# Patient Record
Sex: FEMALE | Race: OTHER | HISPANIC OR LATINO | URBAN - METROPOLITAN AREA
[De-identification: names, ages, dates, MRNs, and addresses within clinical notes are randomized per-mention and may not be internally consistent; named-entity substitution may affect disease eponyms.]

---

## 2017-01-12 ENCOUNTER — OUTPATIENT (OUTPATIENT)
Dept: OUTPATIENT SERVICES | Facility: HOSPITAL | Age: 52
LOS: 1 days | End: 2017-01-12
Payer: SELF-PAY

## 2017-01-12 ENCOUNTER — APPOINTMENT (OUTPATIENT)
Dept: DERMATOLOGY | Facility: HOSPITAL | Age: 52
End: 2017-01-12

## 2017-01-12 VITALS
BODY MASS INDEX: 31.24 KG/M2 | HEIGHT: 64 IN | HEART RATE: 5 BPM | DIASTOLIC BLOOD PRESSURE: 76 MMHG | SYSTOLIC BLOOD PRESSURE: 119 MMHG | WEIGHT: 183 LBS

## 2017-01-12 DIAGNOSIS — Z98.89 OTHER SPECIFIED POSTPROCEDURAL STATES: Chronic | ICD-10-CM

## 2017-01-12 DIAGNOSIS — Z98.51 TUBAL LIGATION STATUS: Chronic | ICD-10-CM

## 2017-01-12 DIAGNOSIS — L65.9 NONSCARRING HAIR LOSS, UNSPECIFIED: ICD-10-CM

## 2017-01-12 DIAGNOSIS — L98.9 DISORDER OF THE SKIN AND SUBCUTANEOUS TISSUE, UNSPECIFIED: ICD-10-CM

## 2017-01-12 PROCEDURE — G0463: CPT

## 2017-02-12 ENCOUNTER — FORM ENCOUNTER (OUTPATIENT)
Age: 52
End: 2017-02-12

## 2017-02-13 ENCOUNTER — OUTPATIENT (OUTPATIENT)
Dept: OUTPATIENT SERVICES | Facility: HOSPITAL | Age: 52
LOS: 1 days | End: 2017-02-13
Payer: SELF-PAY

## 2017-02-13 ENCOUNTER — APPOINTMENT (OUTPATIENT)
Dept: MAMMOGRAPHY | Facility: IMAGING CENTER | Age: 52
End: 2017-02-13

## 2017-02-13 ENCOUNTER — APPOINTMENT (OUTPATIENT)
Dept: ULTRASOUND IMAGING | Facility: IMAGING CENTER | Age: 52
End: 2017-02-13

## 2017-02-13 DIAGNOSIS — E04.1 NONTOXIC SINGLE THYROID NODULE: ICD-10-CM

## 2017-02-13 DIAGNOSIS — Z98.89 OTHER SPECIFIED POSTPROCEDURAL STATES: Chronic | ICD-10-CM

## 2017-02-13 DIAGNOSIS — N63 UNSPECIFIED LUMP IN BREAST: ICD-10-CM

## 2017-02-13 DIAGNOSIS — Z98.51 TUBAL LIGATION STATUS: Chronic | ICD-10-CM

## 2017-02-13 DIAGNOSIS — Z00.8 ENCOUNTER FOR OTHER GENERAL EXAMINATION: ICD-10-CM

## 2017-02-13 PROCEDURE — 76641 ULTRASOUND BREAST COMPLETE: CPT

## 2017-02-13 PROCEDURE — 77063 BREAST TOMOSYNTHESIS BI: CPT

## 2017-02-13 PROCEDURE — 77067 SCR MAMMO BI INCL CAD: CPT

## 2017-02-14 ENCOUNTER — CHART COPY (OUTPATIENT)
Age: 52
End: 2017-02-14

## 2017-12-11 ENCOUNTER — LABORATORY RESULT (OUTPATIENT)
Age: 52
End: 2017-12-11

## 2017-12-11 ENCOUNTER — APPOINTMENT (OUTPATIENT)
Dept: INTERNAL MEDICINE | Facility: CLINIC | Age: 52
End: 2017-12-11

## 2017-12-11 ENCOUNTER — OUTPATIENT (OUTPATIENT)
Dept: OUTPATIENT SERVICES | Facility: HOSPITAL | Age: 52
LOS: 1 days | End: 2017-12-11
Payer: SELF-PAY

## 2017-12-11 VITALS
HEART RATE: 82 BPM | HEIGHT: 64 IN | WEIGHT: 180 LBS | DIASTOLIC BLOOD PRESSURE: 80 MMHG | SYSTOLIC BLOOD PRESSURE: 118 MMHG | OXYGEN SATURATION: 98 % | BODY MASS INDEX: 30.73 KG/M2

## 2017-12-11 DIAGNOSIS — Z98.51 TUBAL LIGATION STATUS: Chronic | ICD-10-CM

## 2017-12-11 DIAGNOSIS — E55.9 VITAMIN D DEFICIENCY, UNSPECIFIED: ICD-10-CM

## 2017-12-11 DIAGNOSIS — I10 ESSENTIAL (PRIMARY) HYPERTENSION: ICD-10-CM

## 2017-12-11 DIAGNOSIS — Z98.89 OTHER SPECIFIED POSTPROCEDURAL STATES: Chronic | ICD-10-CM

## 2017-12-18 ENCOUNTER — LABORATORY RESULT (OUTPATIENT)
Age: 52
End: 2017-12-18

## 2017-12-18 PROCEDURE — 90688 IIV4 VACCINE SPLT 0.5 ML IM: CPT

## 2017-12-18 PROCEDURE — 87338 HPYLORI STOOL AG IA: CPT

## 2017-12-18 PROCEDURE — G0463: CPT

## 2017-12-18 PROCEDURE — G0008: CPT

## 2017-12-19 DIAGNOSIS — K21.9 GASTRO-ESOPHAGEAL REFLUX DISEASE WITHOUT ESOPHAGITIS: ICD-10-CM

## 2017-12-19 DIAGNOSIS — E04.1 NONTOXIC SINGLE THYROID NODULE: ICD-10-CM

## 2017-12-19 DIAGNOSIS — Z23 ENCOUNTER FOR IMMUNIZATION: ICD-10-CM

## 2017-12-20 LAB — H PYLORI AG STL QL: NEGATIVE — SIGNIFICANT CHANGE UP

## 2017-12-22 LAB
ALBUMIN SERPL ELPH-MCNC: 4.4 G/DL
ALP BLD-CCNC: 88 U/L
ALT SERPL-CCNC: 34 U/L
ANION GAP SERPL CALC-SCNC: 10 MMOL/L
AST SERPL-CCNC: 20 U/L
BASOPHILS # BLD AUTO: 0.03 K/UL
BASOPHILS NFR BLD AUTO: 0.6 %
BILIRUB SERPL-MCNC: 0.4 MG/DL
BUN SERPL-MCNC: 12 MG/DL
CALCIUM SERPL-MCNC: 9.8 MG/DL
CHLORIDE SERPL-SCNC: 102 MMOL/L
CHOLEST SERPL-MCNC: 232 MG/DL
CHOLEST/HDLC SERPL: 4.2 RATIO
CO2 SERPL-SCNC: 28 MMOL/L
CREAT SERPL-MCNC: 0.61 MG/DL
EOSINOPHIL # BLD AUTO: 0.07 K/UL
EOSINOPHIL NFR BLD AUTO: 1.4 %
GLUCOSE SERPL-MCNC: 86 MG/DL
HBA1C MFR BLD HPLC: 5.4 %
HCT VFR BLD CALC: 43.4 %
HDLC SERPL-MCNC: 55 MG/DL
HGB BLD-MCNC: 14.3 G/DL
IMM GRANULOCYTES NFR BLD AUTO: 0.2 %
LDLC SERPL CALC-MCNC: 135 MG/DL
LYMPHOCYTES # BLD AUTO: 1.92 K/UL
LYMPHOCYTES NFR BLD AUTO: 38.8 %
MAN DIFF?: NORMAL
MCHC RBC-ENTMCNC: 29.9 PG
MCHC RBC-ENTMCNC: 32.9 GM/DL
MCV RBC AUTO: 90.6 FL
MONOCYTES # BLD AUTO: 0.4 K/UL
MONOCYTES NFR BLD AUTO: 8.1 %
NEUTROPHILS # BLD AUTO: 2.52 K/UL
NEUTROPHILS NFR BLD AUTO: 50.9 %
PLATELET # BLD AUTO: 217 K/UL
POTASSIUM SERPL-SCNC: 4.4 MMOL/L
PROT SERPL-MCNC: 7.8 G/DL
RBC # BLD: 4.79 M/UL
RBC # FLD: 12.6 %
SODIUM SERPL-SCNC: 140 MMOL/L
TRIGL SERPL-MCNC: 212 MG/DL
TSH SERPL-ACNC: 1.94 UIU/ML
WBC # FLD AUTO: 4.95 K/UL

## 2018-01-21 ENCOUNTER — FORM ENCOUNTER (OUTPATIENT)
Age: 53
End: 2018-01-21

## 2018-01-22 ENCOUNTER — APPOINTMENT (OUTPATIENT)
Dept: ULTRASOUND IMAGING | Facility: IMAGING CENTER | Age: 53
End: 2018-01-22
Payer: COMMERCIAL

## 2018-01-22 ENCOUNTER — APPOINTMENT (OUTPATIENT)
Dept: MAMMOGRAPHY | Facility: IMAGING CENTER | Age: 53
End: 2018-01-22
Payer: COMMERCIAL

## 2018-01-22 ENCOUNTER — OUTPATIENT (OUTPATIENT)
Dept: OUTPATIENT SERVICES | Facility: HOSPITAL | Age: 53
LOS: 1 days | End: 2018-01-22
Payer: SELF-PAY

## 2018-01-22 DIAGNOSIS — E04.1 NONTOXIC SINGLE THYROID NODULE: ICD-10-CM

## 2018-01-22 DIAGNOSIS — Z98.51 TUBAL LIGATION STATUS: Chronic | ICD-10-CM

## 2018-01-22 DIAGNOSIS — K21.9 GASTRO-ESOPHAGEAL REFLUX DISEASE WITHOUT ESOPHAGITIS: ICD-10-CM

## 2018-01-22 DIAGNOSIS — E55.9 VITAMIN D DEFICIENCY, UNSPECIFIED: ICD-10-CM

## 2018-01-22 DIAGNOSIS — Z23 ENCOUNTER FOR IMMUNIZATION: ICD-10-CM

## 2018-01-22 DIAGNOSIS — Z98.89 OTHER SPECIFIED POSTPROCEDURAL STATES: Chronic | ICD-10-CM

## 2018-01-22 PROCEDURE — G0279: CPT | Mod: 26

## 2018-01-22 PROCEDURE — 76642 ULTRASOUND BREAST LIMITED: CPT | Mod: 26,LT

## 2018-01-22 PROCEDURE — 76642 ULTRASOUND BREAST LIMITED: CPT

## 2018-01-22 PROCEDURE — 77066 DX MAMMO INCL CAD BI: CPT | Mod: 26

## 2018-01-22 PROCEDURE — 77066 DX MAMMO INCL CAD BI: CPT

## 2018-01-22 PROCEDURE — G0279: CPT

## 2018-02-08 ENCOUNTER — APPOINTMENT (OUTPATIENT)
Dept: DERMATOLOGY | Facility: HOSPITAL | Age: 53
End: 2018-02-08

## 2018-02-08 ENCOUNTER — OUTPATIENT (OUTPATIENT)
Dept: OUTPATIENT SERVICES | Facility: HOSPITAL | Age: 53
LOS: 1 days | End: 2018-02-08
Payer: SELF-PAY

## 2018-02-08 VITALS
BODY MASS INDEX: 31.07 KG/M2 | DIASTOLIC BLOOD PRESSURE: 78 MMHG | SYSTOLIC BLOOD PRESSURE: 118 MMHG | HEIGHT: 64 IN | HEART RATE: 80 BPM | WEIGHT: 182 LBS | RESPIRATION RATE: 16 BRPM

## 2018-02-08 DIAGNOSIS — Z98.89 OTHER SPECIFIED POSTPROCEDURAL STATES: Chronic | ICD-10-CM

## 2018-02-08 DIAGNOSIS — Z98.51 TUBAL LIGATION STATUS: Chronic | ICD-10-CM

## 2018-02-08 DIAGNOSIS — L98.9 DISORDER OF THE SKIN AND SUBCUTANEOUS TISSUE, UNSPECIFIED: ICD-10-CM

## 2018-02-08 PROCEDURE — G0463: CPT

## 2018-02-13 ENCOUNTER — OUTPATIENT (OUTPATIENT)
Dept: OUTPATIENT SERVICES | Facility: HOSPITAL | Age: 53
LOS: 1 days | End: 2018-02-13
Payer: SELF-PAY

## 2018-02-13 ENCOUNTER — APPOINTMENT (OUTPATIENT)
Dept: GASTROENTEROLOGY | Facility: HOSPITAL | Age: 53
End: 2018-02-13

## 2018-02-13 VITALS
DIASTOLIC BLOOD PRESSURE: 82 MMHG | WEIGHT: 182 LBS | HEIGHT: 64 IN | RESPIRATION RATE: 14 BRPM | HEART RATE: 89 BPM | SYSTOLIC BLOOD PRESSURE: 125 MMHG | BODY MASS INDEX: 31.07 KG/M2

## 2018-02-13 DIAGNOSIS — K31.0 ACUTE DILATATION OF STOMACH: ICD-10-CM

## 2018-02-13 DIAGNOSIS — Z98.89 OTHER SPECIFIED POSTPROCEDURAL STATES: Chronic | ICD-10-CM

## 2018-02-13 DIAGNOSIS — Z12.11 ENCOUNTER FOR SCREENING FOR MALIGNANT NEOPLASM OF COLON: ICD-10-CM

## 2018-02-13 DIAGNOSIS — Z98.51 TUBAL LIGATION STATUS: Chronic | ICD-10-CM

## 2018-02-13 PROCEDURE — G0463: CPT

## 2018-02-13 RX ORDER — POLYETHYLENE GLYCOL 3350, SODIUM SULFATE ANHYDROUS, SODIUM BICARBONATE, SODIUM CHLORIDE, POTASSIUM CHLORIDE 227.1; 21.5; 6.36; 5.53; .754 G/L; G/L; G/L; G/L; G/L
227.1 POWDER, FOR SOLUTION ORAL
Qty: 4000 | Refills: 0 | Status: DISCONTINUED | COMMUNITY
Start: 2018-02-13 | End: 2018-02-13

## 2018-03-08 ENCOUNTER — APPOINTMENT (OUTPATIENT)
Dept: ENDOCRINOLOGY | Facility: HOSPITAL | Age: 53
End: 2018-03-08

## 2018-03-08 ENCOUNTER — OUTPATIENT (OUTPATIENT)
Dept: OUTPATIENT SERVICES | Facility: HOSPITAL | Age: 53
LOS: 1 days | End: 2018-03-08
Payer: SELF-PAY

## 2018-03-08 VITALS
HEART RATE: 97 BPM | DIASTOLIC BLOOD PRESSURE: 74 MMHG | HEIGHT: 64 IN | WEIGHT: 184 LBS | SYSTOLIC BLOOD PRESSURE: 123 MMHG | BODY MASS INDEX: 31.41 KG/M2

## 2018-03-08 DIAGNOSIS — Z98.89 OTHER SPECIFIED POSTPROCEDURAL STATES: Chronic | ICD-10-CM

## 2018-03-08 DIAGNOSIS — E04.1 NONTOXIC SINGLE THYROID NODULE: ICD-10-CM

## 2018-03-08 DIAGNOSIS — Z82.5 FAMILY HISTORY OF ASTHMA AND OTHER CHRONIC LOWER RESPIRATORY DISEASES: ICD-10-CM

## 2018-03-08 DIAGNOSIS — E06.9 THYROIDITIS, UNSPECIFIED: ICD-10-CM

## 2018-03-08 DIAGNOSIS — Z98.51 TUBAL LIGATION STATUS: Chronic | ICD-10-CM

## 2018-03-08 DIAGNOSIS — Z82.49 FAMILY HISTORY OF ISCHEMIC HEART DISEASE AND OTHER DISEASES OF THE CIRCULATORY SYSTEM: ICD-10-CM

## 2018-03-08 DIAGNOSIS — R79.89 OTHER SPECIFIED ABNORMAL FINDINGS OF BLOOD CHEMISTRY: ICD-10-CM

## 2018-03-08 DIAGNOSIS — R73.09 OTHER ABNORMAL GLUCOSE: ICD-10-CM

## 2018-03-08 DIAGNOSIS — Z83.3 FAMILY HISTORY OF DIABETES MELLITUS: ICD-10-CM

## 2018-03-08 PROCEDURE — G0463: CPT

## 2018-03-08 RX ORDER — OMEPRAZOLE 40 MG/1
40 CAPSULE, DELAYED RELEASE ORAL
Qty: 30 | Refills: 0 | Status: DISCONTINUED | COMMUNITY
Start: 2017-12-11 | End: 2018-03-08

## 2018-03-14 ENCOUNTER — OUTPATIENT (OUTPATIENT)
Dept: OUTPATIENT SERVICES | Facility: HOSPITAL | Age: 53
LOS: 1 days | End: 2018-03-14
Payer: SELF-PAY

## 2018-03-14 ENCOUNTER — RESULT REVIEW (OUTPATIENT)
Age: 53
End: 2018-03-14

## 2018-03-14 ENCOUNTER — APPOINTMENT (OUTPATIENT)
Dept: ULTRASOUND IMAGING | Facility: IMAGING CENTER | Age: 53
End: 2018-03-14
Payer: SELF-PAY

## 2018-03-14 DIAGNOSIS — Z98.89 OTHER SPECIFIED POSTPROCEDURAL STATES: Chronic | ICD-10-CM

## 2018-03-14 DIAGNOSIS — Z98.51 TUBAL LIGATION STATUS: Chronic | ICD-10-CM

## 2018-03-14 DIAGNOSIS — E04.1 NONTOXIC SINGLE THYROID NODULE: ICD-10-CM

## 2018-03-14 PROCEDURE — 88173 CYTOPATH EVAL FNA REPORT: CPT | Mod: 26

## 2018-03-14 PROCEDURE — 10022: CPT

## 2018-03-14 PROCEDURE — 76942 ECHO GUIDE FOR BIOPSY: CPT | Mod: 26

## 2018-03-15 PROCEDURE — 88172 CYTP DX EVAL FNA 1ST EA SITE: CPT

## 2018-03-15 PROCEDURE — 10022: CPT

## 2018-03-15 PROCEDURE — 81445 SO NEO GSAP 5-50DNA/DNA&RNA: CPT

## 2018-03-15 PROCEDURE — 76942 ECHO GUIDE FOR BIOPSY: CPT

## 2018-03-15 PROCEDURE — 88173 CYTOPATH EVAL FNA REPORT: CPT

## 2018-03-29 ENCOUNTER — RESULT REVIEW (OUTPATIENT)
Age: 53
End: 2018-03-29

## 2018-03-29 ENCOUNTER — OUTPATIENT (OUTPATIENT)
Dept: OUTPATIENT SERVICES | Facility: HOSPITAL | Age: 53
LOS: 1 days | End: 2018-03-29
Payer: SELF-PAY

## 2018-03-29 DIAGNOSIS — Z98.89 OTHER SPECIFIED POSTPROCEDURAL STATES: Chronic | ICD-10-CM

## 2018-03-29 DIAGNOSIS — Z00.00 ENCOUNTER FOR GENERAL ADULT MEDICAL EXAMINATION WITHOUT ABNORMAL FINDINGS: ICD-10-CM

## 2018-03-29 DIAGNOSIS — Z98.51 TUBAL LIGATION STATUS: Chronic | ICD-10-CM

## 2018-03-29 PROCEDURE — 45381 COLONOSCOPY SUBMUCOUS NJX: CPT | Mod: PT

## 2018-03-29 PROCEDURE — 45380 COLONOSCOPY AND BIOPSY: CPT | Mod: 59,33

## 2018-03-29 PROCEDURE — 45385 COLONOSCOPY W/LESION REMOVAL: CPT | Mod: 33

## 2018-03-29 PROCEDURE — 45385 COLONOSCOPY W/LESION REMOVAL: CPT | Mod: PT

## 2018-03-29 PROCEDURE — 45380 COLONOSCOPY AND BIOPSY: CPT | Mod: XS,PT

## 2018-05-02 ENCOUNTER — OUTPATIENT (OUTPATIENT)
Dept: OUTPATIENT SERVICES | Facility: HOSPITAL | Age: 53
LOS: 1 days | End: 2018-05-02
Payer: SELF-PAY

## 2018-05-02 ENCOUNTER — APPOINTMENT (OUTPATIENT)
Dept: INTERNAL MEDICINE | Facility: CLINIC | Age: 53
End: 2018-05-02

## 2018-05-02 VITALS — SYSTOLIC BLOOD PRESSURE: 130 MMHG | DIASTOLIC BLOOD PRESSURE: 89 MMHG | OXYGEN SATURATION: 99 % | HEART RATE: 98 BPM

## 2018-05-02 VITALS — BODY MASS INDEX: 31.93 KG/M2 | WEIGHT: 186 LBS

## 2018-05-02 DIAGNOSIS — I10 ESSENTIAL (PRIMARY) HYPERTENSION: ICD-10-CM

## 2018-05-02 DIAGNOSIS — Z98.51 TUBAL LIGATION STATUS: Chronic | ICD-10-CM

## 2018-05-02 DIAGNOSIS — Z98.89 OTHER SPECIFIED POSTPROCEDURAL STATES: Chronic | ICD-10-CM

## 2018-05-02 PROCEDURE — G0463: CPT

## 2018-05-09 DIAGNOSIS — C73 MALIGNANT NEOPLASM OF THYROID GLAND: ICD-10-CM

## 2018-05-10 ENCOUNTER — OUTPATIENT (OUTPATIENT)
Dept: OUTPATIENT SERVICES | Facility: HOSPITAL | Age: 53
LOS: 1 days | Discharge: HOME | End: 2018-05-10

## 2018-05-10 ENCOUNTER — APPOINTMENT (OUTPATIENT)
Dept: OTOLARYNGOLOGY | Facility: CLINIC | Age: 53
End: 2018-05-10
Payer: COMMERCIAL

## 2018-05-10 VITALS
WEIGHT: 186 LBS | BODY MASS INDEX: 31.76 KG/M2 | DIASTOLIC BLOOD PRESSURE: 91 MMHG | SYSTOLIC BLOOD PRESSURE: 156 MMHG | HEIGHT: 64 IN | HEART RATE: 94 BPM

## 2018-05-10 DIAGNOSIS — Z98.51 TUBAL LIGATION STATUS: Chronic | ICD-10-CM

## 2018-05-10 DIAGNOSIS — Z98.89 OTHER SPECIFIED POSTPROCEDURAL STATES: Chronic | ICD-10-CM

## 2018-05-10 PROCEDURE — 99204 OFFICE O/P NEW MOD 45 MIN: CPT | Mod: 25

## 2018-05-10 PROCEDURE — 31575 DIAGNOSTIC LARYNGOSCOPY: CPT

## 2018-05-14 DIAGNOSIS — E04.1 NONTOXIC SINGLE THYROID NODULE: ICD-10-CM

## 2018-05-22 ENCOUNTER — OUTPATIENT (OUTPATIENT)
Dept: OUTPATIENT SERVICES | Facility: HOSPITAL | Age: 53
LOS: 1 days | End: 2018-05-22
Payer: SUBSIDIZED

## 2018-05-22 VITALS
TEMPERATURE: 97 F | HEART RATE: 60 BPM | HEIGHT: 64.5 IN | SYSTOLIC BLOOD PRESSURE: 120 MMHG | RESPIRATION RATE: 16 BRPM | DIASTOLIC BLOOD PRESSURE: 80 MMHG | WEIGHT: 181 LBS | OXYGEN SATURATION: 97 %

## 2018-05-22 DIAGNOSIS — Z98.89 OTHER SPECIFIED POSTPROCEDURAL STATES: Chronic | ICD-10-CM

## 2018-05-22 DIAGNOSIS — E04.1 NONTOXIC SINGLE THYROID NODULE: ICD-10-CM

## 2018-05-22 DIAGNOSIS — Z98.51 TUBAL LIGATION STATUS: Chronic | ICD-10-CM

## 2018-05-22 DIAGNOSIS — Z90.710 ACQUIRED ABSENCE OF BOTH CERVIX AND UTERUS: Chronic | ICD-10-CM

## 2018-05-22 DIAGNOSIS — E07.9 DISORDER OF THYROID, UNSPECIFIED: ICD-10-CM

## 2018-05-22 LAB
BUN SERPL-MCNC: 7 MG/DL — SIGNIFICANT CHANGE UP (ref 7–23)
CALCIUM SERPL-MCNC: 9.1 MG/DL — SIGNIFICANT CHANGE UP (ref 8.4–10.5)
CHLORIDE SERPL-SCNC: 101 MMOL/L — SIGNIFICANT CHANGE UP (ref 98–107)
CO2 SERPL-SCNC: 26 MMOL/L — SIGNIFICANT CHANGE UP (ref 22–31)
CREAT SERPL-MCNC: 0.53 MG/DL — SIGNIFICANT CHANGE UP (ref 0.5–1.3)
GLUCOSE SERPL-MCNC: 84 MG/DL — SIGNIFICANT CHANGE UP (ref 70–99)
HCT VFR BLD CALC: 42.9 % — SIGNIFICANT CHANGE UP (ref 34.5–45)
HGB BLD-MCNC: 14.3 G/DL — SIGNIFICANT CHANGE UP (ref 11.5–15.5)
MCHC RBC-ENTMCNC: 29.7 PG — SIGNIFICANT CHANGE UP (ref 27–34)
MCHC RBC-ENTMCNC: 33.3 % — SIGNIFICANT CHANGE UP (ref 32–36)
MCV RBC AUTO: 89 FL — SIGNIFICANT CHANGE UP (ref 80–100)
NRBC # FLD: 0 — SIGNIFICANT CHANGE UP
PLATELET # BLD AUTO: 198 K/UL — SIGNIFICANT CHANGE UP (ref 150–400)
PMV BLD: 11.5 FL — SIGNIFICANT CHANGE UP (ref 7–13)
POTASSIUM SERPL-MCNC: 3.7 MMOL/L — SIGNIFICANT CHANGE UP (ref 3.5–5.3)
POTASSIUM SERPL-SCNC: 3.7 MMOL/L — SIGNIFICANT CHANGE UP (ref 3.5–5.3)
RBC # BLD: 4.82 M/UL — SIGNIFICANT CHANGE UP (ref 3.8–5.2)
RBC # FLD: 11.7 % — SIGNIFICANT CHANGE UP (ref 10.3–14.5)
SODIUM SERPL-SCNC: 141 MMOL/L — SIGNIFICANT CHANGE UP (ref 135–145)
WBC # BLD: 4.62 K/UL — SIGNIFICANT CHANGE UP (ref 3.8–10.5)
WBC # FLD AUTO: 4.62 K/UL — SIGNIFICANT CHANGE UP (ref 3.8–10.5)

## 2018-05-22 PROCEDURE — 93010 ELECTROCARDIOGRAM REPORT: CPT

## 2018-05-22 RX ORDER — SODIUM CHLORIDE 9 MG/ML
1000 INJECTION, SOLUTION INTRAVENOUS
Qty: 0 | Refills: 0 | Status: DISCONTINUED | OUTPATIENT
Start: 2018-05-30 | End: 2018-06-14

## 2018-05-22 NOTE — H&P PST ADULT - LYMPHATIC
anterior cervical R/posterior cervical L/supraclavicular R/anterior cervical L/supraclavicular L/posterior cervical R

## 2018-05-22 NOTE — H&P PST ADULT - PROBLEM SELECTOR PLAN 1
Left Thyroid Lobectomy    Pre op instructions given to pt ; via [facility]  ; including Hibiclens and pepcid ; pt appears to have a good understanding of pre op instructions

## 2018-05-22 NOTE — H&P PST ADULT - NEGATIVE NEUROLOGICAL SYMPTOMS
no generalized seizures/no transient paralysis/no loss of sensation/no focal seizures/no difficulty walking/no paresthesias/no weakness

## 2018-05-22 NOTE — H&P PST ADULT - HISTORY OF PRESENT ILLNESS
Pt is a 52 y.o. Romanian speaking female ; pst done with assistance of [facility]  # [id number] ; pt reports Pt is a 52 y.o. Mauritian speaking female ; pst done with assistance of [facility]  # [id number] ; pt reports h/o left thyroid nodule ; pt states a biopsy was done ; pt to surgeon ; pt now presents for Left Thyroid Lobectomy

## 2018-05-22 NOTE — H&P PST ADULT - PSH
H/O abdominal hysterectomy  2016  S/P breast biopsy, left  3/18 ; pt states " benign "  S/P tubal ligation  24 yrs ago

## 2018-05-22 NOTE — H&P PST ADULT - PMH
Benign breast lumps, left    HTN (hypertension)  pt states recently noted ; pt denies rx  Iron deficiency anemia    Uterine leiomyoma Benign breast lumps, left    HTN (hypertension)  pt states recently noted ; pt denies rx  Iron deficiency anemia    Thyroid nodule  left  Uterine leiomyoma Benign breast lumps, left    HTN (hypertension)  pt states recently noted ; pt denies rx  Hypercholesterolemia    Iron deficiency anemia    Thyroid nodule  left  Uterine leiomyoma

## 2018-05-22 NOTE — H&P PST ADULT - NSANTHOSAYNRD_GEN_A_CORE
No. DAVID screening performed.  STOP BANG Legend: 0-2 = LOW Risk; 3-4 = INTERMEDIATE Risk; 5-8 = HIGH Risk/pt denies partner

## 2018-05-29 ENCOUNTER — TRANSCRIPTION ENCOUNTER (OUTPATIENT)
Age: 53
End: 2018-05-29

## 2018-05-29 NOTE — ASU PATIENT PROFILE, ADULT - PMH
Benign breast lumps, left    HTN (hypertension)  pt states recently noted ; pt denies rx  Hypercholesterolemia    Iron deficiency anemia    Thyroid nodule  left  Uterine leiomyoma

## 2018-05-30 ENCOUNTER — OUTPATIENT (OUTPATIENT)
Dept: OUTPATIENT SERVICES | Facility: HOSPITAL | Age: 53
LOS: 1 days | Discharge: HOME | End: 2018-05-30
Payer: SUBSIDIZED

## 2018-05-30 ENCOUNTER — APPOINTMENT (OUTPATIENT)
Dept: OTOLARYNGOLOGY | Facility: HOSPITAL | Age: 53
End: 2018-05-30

## 2018-05-30 ENCOUNTER — RESULT REVIEW (OUTPATIENT)
Age: 53
End: 2018-05-30

## 2018-05-30 VITALS
SYSTOLIC BLOOD PRESSURE: 126 MMHG | RESPIRATION RATE: 16 BRPM | OXYGEN SATURATION: 96 % | HEART RATE: 86 BPM | DIASTOLIC BLOOD PRESSURE: 77 MMHG

## 2018-05-30 VITALS
TEMPERATURE: 98 F | WEIGHT: 181 LBS | RESPIRATION RATE: 16 BRPM | HEIGHT: 64.5 IN | DIASTOLIC BLOOD PRESSURE: 79 MMHG | OXYGEN SATURATION: 100 % | HEART RATE: 73 BPM | SYSTOLIC BLOOD PRESSURE: 123 MMHG

## 2018-05-30 DIAGNOSIS — E04.1 NONTOXIC SINGLE THYROID NODULE: ICD-10-CM

## 2018-05-30 DIAGNOSIS — Z98.51 TUBAL LIGATION STATUS: Chronic | ICD-10-CM

## 2018-05-30 DIAGNOSIS — Z98.89 OTHER SPECIFIED POSTPROCEDURAL STATES: Chronic | ICD-10-CM

## 2018-05-30 DIAGNOSIS — Z90.710 ACQUIRED ABSENCE OF BOTH CERVIX AND UTERUS: Chronic | ICD-10-CM

## 2018-05-30 PROCEDURE — 88307 TISSUE EXAM BY PATHOLOGIST: CPT | Mod: 26

## 2018-05-30 PROCEDURE — 60220 PARTIAL REMOVAL OF THYROID: CPT

## 2018-05-30 RX ORDER — OXYCODONE HYDROCHLORIDE 5 MG/1
5 TABLET ORAL EVERY 4 HOURS
Qty: 0 | Refills: 0 | Status: DISCONTINUED | OUTPATIENT
Start: 2018-05-30 | End: 2018-05-30

## 2018-05-30 RX ORDER — OXYCODONE AND ACETAMINOPHEN 5; 325 MG/1; MG/1
2 TABLET ORAL EVERY 6 HOURS
Qty: 0 | Refills: 0 | Status: DISCONTINUED | OUTPATIENT
Start: 2018-05-30 | End: 2018-05-30

## 2018-05-30 RX ORDER — SODIUM CHLORIDE 9 MG/ML
1000 INJECTION, SOLUTION INTRAVENOUS
Qty: 0 | Refills: 0 | Status: DISCONTINUED | OUTPATIENT
Start: 2018-05-30 | End: 2018-06-14

## 2018-05-30 RX ORDER — FENTANYL CITRATE 50 UG/ML
25 INJECTION INTRAVENOUS
Qty: 0 | Refills: 0 | Status: DISCONTINUED | OUTPATIENT
Start: 2018-05-30 | End: 2018-05-30

## 2018-05-30 RX ORDER — ONDANSETRON 8 MG/1
4 TABLET, FILM COATED ORAL ONCE
Qty: 0 | Refills: 0 | Status: DISCONTINUED | OUTPATIENT
Start: 2018-05-30 | End: 2018-05-30

## 2018-05-30 RX ORDER — OXYCODONE AND ACETAMINOPHEN 5; 325 MG/1; MG/1
1 TABLET ORAL EVERY 4 HOURS
Qty: 0 | Refills: 0 | Status: DISCONTINUED | OUTPATIENT
Start: 2018-05-30 | End: 2018-05-30

## 2018-05-30 RX ORDER — METOCLOPRAMIDE HCL 10 MG
10 TABLET ORAL ONCE
Qty: 0 | Refills: 0 | Status: DISCONTINUED | OUTPATIENT
Start: 2018-05-30 | End: 2018-05-30

## 2018-05-30 RX ADMIN — FENTANYL CITRATE 25 MICROGRAM(S): 50 INJECTION INTRAVENOUS at 13:45

## 2018-05-30 RX ADMIN — FENTANYL CITRATE 25 MICROGRAM(S): 50 INJECTION INTRAVENOUS at 13:10

## 2018-05-30 RX ADMIN — SODIUM CHLORIDE 75 MILLILITER(S): 9 INJECTION, SOLUTION INTRAVENOUS at 12:30

## 2018-05-30 RX ADMIN — FENTANYL CITRATE 25 MICROGRAM(S): 50 INJECTION INTRAVENOUS at 13:30

## 2018-05-30 RX ADMIN — FENTANYL CITRATE 25 MICROGRAM(S): 50 INJECTION INTRAVENOUS at 12:55

## 2018-05-30 RX ADMIN — OXYCODONE HYDROCHLORIDE 5 MILLIGRAM(S): 5 TABLET ORAL at 15:10

## 2018-05-30 NOTE — ASU DISCHARGE PLAN (ADULT/PEDIATRIC). - NURSING INSTRUCTIONS
Do not start taking your pain medications until after 5:50pm today. Do not take pain medication on an empty stomach.  Increase fluids and fiber in diet to prevent constipation.     Call MD for any neck swelling, any shortness of breath, or any redness/drainage from incision. Stay away from hot, spicy and jagged edged foods.  Call MD for any nasal tip, fingertip or extremity numbness/tingling.     Also call the doctor for any questions or concerns regarding your care and to schedule your follow-up visit. Do not start taking your pain medications until after 7:10pm tonight. Also do not take pain medication on an empty stomach.  Increase fluids and fiber in diet to prevent constipation.     Call MD for any neck swelling, any shortness of breath, or any redness/drainage from incision. Stay away from hot, spicy and jagged edged foods.  Call MD for any nasal tip, fingertip or extremity numbness/tingling.     Also call the doctor for any questions or concerns regarding your care and to schedule your follow-up visit.

## 2018-05-30 NOTE — ASU DISCHARGE PLAN (ADULT/PEDIATRIC). - MEDICATION SUMMARY - MEDICATIONS TO TAKE
I will START or STAY ON the medications listed below when I get home from the hospital:    oxyCODONE-acetaminophen 5 mg-325 mg oral tablet  -- 1 tab(s) by mouth every 4 hours, As needed, Moderate Pain MDD:4 tabs  -- Indication: For for severe pain    Vitamin C 500 mg oral tablet  -- 1 tab(s) by mouth once a day  -- Indication: For home med

## 2018-05-30 NOTE — ASU DISCHARGE PLAN (ADULT/PEDIATRIC). - NOTIFY
Bleeding that does not stop/Numbness, tingling/Swelling that continues/Fever greater than 101/Numbness, color, or temperature change to extremity/Persistent Nausea and Vomiting/Unable to Urinate/Pain not relieved by Medications

## 2018-06-14 ENCOUNTER — APPOINTMENT (OUTPATIENT)
Dept: OTOLARYNGOLOGY | Facility: CLINIC | Age: 53
End: 2018-06-14
Payer: COMMERCIAL

## 2018-06-14 VITALS
HEIGHT: 64 IN | BODY MASS INDEX: 31.41 KG/M2 | SYSTOLIC BLOOD PRESSURE: 161 MMHG | WEIGHT: 184 LBS | HEART RATE: 107 BPM | DIASTOLIC BLOOD PRESSURE: 101 MMHG

## 2018-06-14 PROCEDURE — 99024 POSTOP FOLLOW-UP VISIT: CPT

## 2018-06-26 LAB
T4 FREE SERPL-MCNC: 1.2 NG/DL
TSH SERPL-ACNC: 2.54 UIU/ML

## 2018-07-11 ENCOUNTER — APPOINTMENT (OUTPATIENT)
Dept: INTERNAL MEDICINE | Facility: CLINIC | Age: 53
End: 2018-07-11

## 2018-08-13 ENCOUNTER — APPOINTMENT (OUTPATIENT)
Dept: INTERNAL MEDICINE | Facility: CLINIC | Age: 53
End: 2018-08-13

## 2018-09-07 PROBLEM — E78.00 PURE HYPERCHOLESTEROLEMIA, UNSPECIFIED: Chronic | Status: ACTIVE | Noted: 2018-05-22

## 2018-09-07 PROBLEM — E04.1 NONTOXIC SINGLE THYROID NODULE: Chronic | Status: ACTIVE | Noted: 2018-05-22

## 2018-09-07 PROBLEM — I10 ESSENTIAL (PRIMARY) HYPERTENSION: Chronic | Status: ACTIVE | Noted: 2018-05-22

## 2018-09-20 ENCOUNTER — APPOINTMENT (OUTPATIENT)
Dept: OTOLARYNGOLOGY | Facility: CLINIC | Age: 53
End: 2018-09-20
Payer: COMMERCIAL

## 2018-09-20 VITALS
HEART RATE: 82 BPM | DIASTOLIC BLOOD PRESSURE: 85 MMHG | SYSTOLIC BLOOD PRESSURE: 142 MMHG | BODY MASS INDEX: 31.58 KG/M2 | WEIGHT: 184 LBS

## 2018-09-20 PROCEDURE — 99213 OFFICE O/P EST LOW 20 MIN: CPT

## 2018-12-17 ENCOUNTER — APPOINTMENT (OUTPATIENT)
Dept: ULTRASOUND IMAGING | Facility: IMAGING CENTER | Age: 53
End: 2018-12-17

## 2018-12-17 ENCOUNTER — APPOINTMENT (OUTPATIENT)
Dept: MAMMOGRAPHY | Facility: IMAGING CENTER | Age: 53
End: 2018-12-17

## 2018-12-20 ENCOUNTER — EMERGENCY (EMERGENCY)
Facility: HOSPITAL | Age: 53
LOS: 1 days | Discharge: HOME | End: 2018-12-20
Attending: EMERGENCY MEDICINE | Admitting: EMERGENCY MEDICINE
Payer: MEDICAID

## 2018-12-20 VITALS
TEMPERATURE: 100 F | RESPIRATION RATE: 18 BRPM | OXYGEN SATURATION: 97 % | HEIGHT: 66 IN | HEART RATE: 110 BPM | SYSTOLIC BLOOD PRESSURE: 168 MMHG | DIASTOLIC BLOOD PRESSURE: 100 MMHG | WEIGHT: 190.04 LBS

## 2018-12-20 DIAGNOSIS — Z98.89 OTHER SPECIFIED POSTPROCEDURAL STATES: Chronic | ICD-10-CM

## 2018-12-20 DIAGNOSIS — Z98.51 TUBAL LIGATION STATUS: Chronic | ICD-10-CM

## 2018-12-20 DIAGNOSIS — Z90.710 ACQUIRED ABSENCE OF BOTH CERVIX AND UTERUS: Chronic | ICD-10-CM

## 2018-12-20 PROCEDURE — 73130 X-RAY EXAM OF HAND: CPT | Mod: 26,RT

## 2018-12-20 PROCEDURE — 73080 X-RAY EXAM OF ELBOW: CPT | Mod: 26,RT

## 2018-12-20 PROCEDURE — 73110 X-RAY EXAM OF WRIST: CPT | Mod: 26,RT

## 2018-12-20 PROCEDURE — 73090 X-RAY EXAM OF FOREARM: CPT | Mod: 26,LT

## 2018-12-20 PROCEDURE — 99284 EMERGENCY DEPT VISIT MOD MDM: CPT

## 2018-12-20 RX ORDER — IBUPROFEN 200 MG
600 TABLET ORAL ONCE
Qty: 0 | Refills: 0 | Status: COMPLETED | OUTPATIENT
Start: 2018-12-20 | End: 2018-12-20

## 2018-12-20 RX ORDER — ACETAMINOPHEN 500 MG
975 TABLET ORAL ONCE
Qty: 0 | Refills: 0 | Status: COMPLETED | OUTPATIENT
Start: 2018-12-20 | End: 2018-12-20

## 2018-12-20 RX ORDER — OXYCODONE HYDROCHLORIDE 5 MG/1
5 TABLET ORAL ONCE
Qty: 0 | Refills: 0 | Status: DISCONTINUED | OUTPATIENT
Start: 2018-12-20 | End: 2018-12-20

## 2018-12-20 RX ADMIN — OXYCODONE HYDROCHLORIDE 5 MILLIGRAM(S): 5 TABLET ORAL at 23:00

## 2018-12-20 RX ADMIN — OXYCODONE HYDROCHLORIDE 5 MILLIGRAM(S): 5 TABLET ORAL at 23:56

## 2018-12-20 RX ADMIN — Medication 600 MILLIGRAM(S): at 20:22

## 2018-12-20 RX ADMIN — Medication 600 MILLIGRAM(S): at 23:00

## 2018-12-20 RX ADMIN — OXYCODONE HYDROCHLORIDE 5 MILLIGRAM(S): 5 TABLET ORAL at 20:22

## 2018-12-20 RX ADMIN — Medication 975 MILLIGRAM(S): at 20:21

## 2018-12-20 RX ADMIN — Medication 975 MILLIGRAM(S): at 23:00

## 2018-12-20 NOTE — ED PROVIDER NOTE - PROGRESS NOTE DETAILS
Ortho at bedside performing wrist reduction   Sweta Quinteros PA-C Reduced by ortho, f/u c outpt hand.  D/C.  --BMM Patient with improved symptoms after reductions. Discharge instructions as written below formally reviewed with patient using  [id number]. Patient verbalized understanding of follow up instructions, home care and pain management and return precautions. Rx for oxy sent to pharmacy. Pt d/c home, Dr. Hong aware  Sweta Quinteros PA-C

## 2018-12-20 NOTE — ED PROVIDER NOTE - MEDICAL DECISION MAKING DETAILS
R clinical distal rad fx +/- ulnar styloid.  NVI.  Diffuse TTP though likely referred.  XRs, pain rx, ortho.  --BMM

## 2018-12-20 NOTE — ED PROVIDER NOTE - OBJECTIVE STATEMENT
52 y/o female with no significant PMHx c/o right arm pain also, notes numbness in the right fingers s/p fall today at 5:30 pm.. Pt is a  and claimed she was taking out the garbage and slipped  on a wet ground due to heavy rain. She slipped backwards and landed on her right hand in order to prevent herself from hitting her head. When she fell pt claims she heard a "pop" when she hit the ground. Pt went to Urgent care and was examined, bandaged and directed to go to the ER for imaging.  Hasn't taken any medications for pain. Pt denies back pain, head pain, dizziness, Cp, SOB, LOC, and abd pain. NKDA. Currently in menopause 54 y/o female with no significant PMHx c/o right arm pain also, notes numbness in the right fingers s/p fall today at 5:30 pm.. Pt is a  and claimed she was taking out the garbage and slipped  on a wet ground due to heavy rain. She slipped backwards and landed on her right hand in order to prevent herself from hitting her head. When she fell pt claims she heard a "pop" when she hit the ground. Pt went to Urgent care and was examined, splinted and bandaged and directed to go to the ER for imaging.  Hasn't taken any medications for pain. Pt denies back pain, head pain, dizziness, Cp, SOB, LOC, and abd pain. NKDA. Currently in menopause 54 y/o female with no significant PMHx c/o right arm pain also, notes numbness in the right fingers s/p fall today at 5:30 pm.. Pt is a  and claimed she was taking out the garbage and slipped  on a wet ground due to heavy rain. She slipped backwards and landed on her right hand in order to prevent herself from hitting her head. When she fell pt claims she heard a "pop" when she hit the ground. Pt went to Urgent care and was examined, splinted and bandaged and directed to go to the ER for imaging.  Hasn't taken any medications for pain. Pt denies back pain, head pain, dizziness, Cp, SOB, LOC, and abd pain. NKDA. Currently in menopause    Present during transcription of HPI by scribtequila. Above history reviewed changes made if necessary. Sweta Quinteros PA-C

## 2018-12-20 NOTE — ED PROVIDER NOTE - NSFOLLOWUPINSTRUCTIONS_ED_ALL_ED_FT
1. Follow up with Dr Mckeon in the next few days after discharge number for follow up provided to make appointment   2. Rest ice and elevate your arm. Ice packs can be placed over your splint. Keep splint intact and dry until follow up  3. For pain take Motrin 600mg every 6 hours alternated with Tylenol 1g every 6 hours. For severe pain take oxycodone 1 tabs as needed every 6 hours (do not drive while taking this medication. It causes sedation)   4. Return to ED for change of symptoms including increased pain, swelling, numbness, cold fingers and any other symptoms of concern

## 2018-12-20 NOTE — ED ADULT NURSE NOTE - NSIMPLEMENTINTERV_GEN_ALL_ED
Implemented All Universal Safety Interventions:  [place] to call system. Call bell, personal items and telephone within reach. Instruct patient to call for assistance. Room bathroom lighting operational. Non-slip footwear when patient is off stretcher. Physically safe environment: no spills, clutter or unnecessary equipment. Stretcher in lowest position, wheels locked, appropriate side rails in place.

## 2018-12-20 NOTE — ED ADULT NURSE NOTE - OBJECTIVE STATEMENT
52 yo F pmh came to ED c/o right wrist injury s/p mechanical fall.  denies hitting head, LOC.  A&Ox4, d 54 yo F pmh came to ED c/o right wrist injury s/p mechanical fall.  denies hitting head, LOC.  A&Ox4, deformity noted on right wrist, edema noted.  +peripheral pulses.  able to move fingers.  Skin w/d/i.  Safety and comfort maintained. Will continue to monitor.

## 2018-12-20 NOTE — ED ADULT NURSE NOTE - CHPI ED NUR SYMPTOMS NEG
no weakness/no fever/no bleeding/no confusion/no vomiting/no loss of consciousness/no numbness/no abrasion/no tingling

## 2018-12-21 ENCOUNTER — OUTPATIENT (OUTPATIENT)
Dept: OUTPATIENT SERVICES | Facility: HOSPITAL | Age: 53
LOS: 1 days | End: 2018-12-21
Payer: SELF-PAY

## 2018-12-21 ENCOUNTER — APPOINTMENT (OUTPATIENT)
Dept: INTERNAL MEDICINE | Facility: CLINIC | Age: 53
End: 2018-12-21

## 2018-12-21 VITALS
DIASTOLIC BLOOD PRESSURE: 80 MMHG | SYSTOLIC BLOOD PRESSURE: 128 MMHG | BODY MASS INDEX: 30.9 KG/M2 | HEIGHT: 64 IN | WEIGHT: 181 LBS

## 2018-12-21 DIAGNOSIS — Z90.710 ACQUIRED ABSENCE OF BOTH CERVIX AND UTERUS: Chronic | ICD-10-CM

## 2018-12-21 DIAGNOSIS — Z98.89 OTHER SPECIFIED POSTPROCEDURAL STATES: Chronic | ICD-10-CM

## 2018-12-21 DIAGNOSIS — I10 ESSENTIAL (PRIMARY) HYPERTENSION: ICD-10-CM

## 2018-12-21 DIAGNOSIS — Z98.51 TUBAL LIGATION STATUS: Chronic | ICD-10-CM

## 2018-12-21 PROCEDURE — 73090 X-RAY EXAM OF FOREARM: CPT | Mod: 26,LT

## 2018-12-21 PROCEDURE — 99285 EMERGENCY DEPT VISIT HI MDM: CPT | Mod: 25

## 2018-12-21 PROCEDURE — 73100 X-RAY EXAM OF WRIST: CPT | Mod: 26,RT

## 2018-12-21 PROCEDURE — 73080 X-RAY EXAM OF ELBOW: CPT

## 2018-12-21 PROCEDURE — 73110 X-RAY EXAM OF WRIST: CPT

## 2018-12-21 PROCEDURE — G0463: CPT

## 2018-12-21 PROCEDURE — 73130 X-RAY EXAM OF HAND: CPT

## 2018-12-21 PROCEDURE — 73090 X-RAY EXAM OF FOREARM: CPT

## 2018-12-21 PROCEDURE — 73100 X-RAY EXAM OF WRIST: CPT

## 2018-12-21 PROCEDURE — 25605 CLTX DST RDL FX/EPHYS SEP W/: CPT | Mod: RT

## 2018-12-21 RX ORDER — ASCORBIC ACID 500 MG
500 TABLET ORAL DAILY
Refills: 0 | Status: DISCONTINUED | COMMUNITY
Start: 2018-03-08 | End: 2018-12-21

## 2018-12-21 RX ORDER — OXYCODONE HYDROCHLORIDE 5 MG/1
1 TABLET ORAL
Qty: 12 | Refills: 0 | OUTPATIENT
Start: 2018-12-21 | End: 2018-12-23

## 2018-12-21 NOTE — ASSESSMENT
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 Bailey

## 2018-12-21 NOTE — HISTORY OF PRESENT ILLNESS
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 Patient reports having fall while working on to outstretched right hand and breaking her wrist. She went to [facility] ED 12/20/18 and was told there were 3 areas of fracture, received splinting/sing and told that she would need urgent surgery in 3d from fracture. Today patient tried to make appointment with ortho clinic and there are no appointments available per patient due to holiday schedule to be seen within that time period. Patient continues to have severe pain and numbness however numbness is better. She came to PCP because she did not know what to do.

## 2018-12-21 NOTE — PHYSICAL EXAM
[Well Nourished] : well nourished [No Respiratory Distress] : no respiratory distress  [Clear to Auscultation] : lungs were clear to auscultation bilaterally [Normal Rate] : normal rate  [Regular Rhythm] : with a regular rhythm [Normal S1, S2] : normal S1 and S2 [de-identified] : in moderate distress from pain in right wrist [de-identified] : right hand is warm, able to move fingers however pain when doing so, reports numbness to light palpation throughout entire hand

## 2018-12-21 NOTE — CONSULT NOTE ADULT - SUBJECTIVE AND OBJECTIVE BOX
53y Female presents c/o R wrist pain sp mechanical fall.  Pt is a  and claimed she was taking out the garbage and slipped on a wet ground due to heavy rain. She slipped backwards and landed on her right hand in order to prevent herself from hitting her head. Denies Headstrike/LOC. Denies numbness, tingling paresthesias in affected extremity. Able to ambulate after fall. No other orthopedic injuries at this time    PAST MEDICAL & SURGICAL HISTORY:    MEDICATIONS  (STANDING):    Allergies    Allergy Status Unknown    Intolerances    Imaging: XR shows right distal radius and ulna fracture    Vital Signs Last 24 Hrs  T(C): 37.6 (12-20-18 @ 20:03), Max: 37.6 (12-20-18 @ 20:03)  T(F): 99.7 (12-20-18 @ 20:03), Max: 99.7 (12-20-18 @ 20:03)  HR: 110 (12-20-18 @ 20:03) (110 - 110)  BP: 168/100 (12-20-18 @ 20:03) (168/100 - 168/100)  BP(mean): --  RR: 18 (12-20-18 @ 20:03) (18 - 18)  SpO2: 97% (12-20-18 @ 20:03) (97% - 97%)  Gen: NAD  RUE: Skin intact, +gross deformity of the wrist, +ain/pin/m/r/u function, SILT C5-T1, radial pulse intact, compartments soft, brisk cap refill     Secondary Survey: Full ROM of unaffected extremities, SILT globally, compartments soft, no bony TTP over bony prominences, no calf TTP, able to SLR with bilateral LE, no TTP along axial spine    Procedure: 10 cc 1% lidocaine hematoma block injected under sterile procedure into R/L wrist. Closed reduction and placement of a sugartong splint performed. Post procedure imaging demonstrates appropriately reduced distal radius. Post procedure exam demonstrated NV intact.    A/P: 53y Female w R/L distal radius fracture sp closed reduction and placement of sugartong splint  Pain control  NWB RUE in sling for comfort   keep splint clean and china  Ice, elevate extremity  Active movement of fingers encouraged  Need for surgical intervention in future discussed with patient  Follow up with Dr. Mckeon within 1 week, call office for appointment  Ortho stable for NV 53y Female presents c/o R wrist pain sp mechanical fall.  Pt is a  and claimed she was taking out the garbage and slipped on a wet ground due to heavy rain. She slipped backwards and landed on her right hand in order to prevent herself from hitting her head. Denies Headstrike/LOC. Denies numbness, tingling paresthesias in affected extremity. Able to ambulate after fall. No other orthopedic injuries at this time    PAST MEDICAL & SURGICAL HISTORY:    MEDICATIONS  (STANDING):    Allergies    Allergy Status Unknown    Intolerances    Imaging: XR shows right distal radius and ulna fracture    Vital Signs Last 24 Hrs  T(C): 37.6 (12-20-18 @ 20:03), Max: 37.6 (12-20-18 @ 20:03)  T(F): 99.7 (12-20-18 @ 20:03), Max: 99.7 (12-20-18 @ 20:03)  HR: 110 (12-20-18 @ 20:03) (110 - 110)  BP: 168/100 (12-20-18 @ 20:03) (168/100 - 168/100)  BP(mean): --  RR: 18 (12-20-18 @ 20:03) (18 - 18)  SpO2: 97% (12-20-18 @ 20:03) (97% - 97%)  Gen: NAD  RUE: Skin intact, +gross deformity of the wrist, +ain/pin/m/r/u function, SILT C5-T1, radial pulse intact, compartments soft, brisk cap refill     Secondary Survey: Full ROM of unaffected extremities, SILT globally, compartments soft, no bony TTP over bony prominences, no calf TTP, able to SLR with bilateral LE, no TTP along axial spine    Procedure: 10 cc 1% lidocaine hematoma block injected under sterile procedure into R/L wrist. Closed reduction and placement of a sugartong splint performed. Post procedure imaging demonstrates appropriately reduced distal radius. Post procedure exam demonstrated NV intact.    A/P: 53y Female w R distal radius fracture sp closed reduction and placement of sugartong splint  Pain control  NWB RUE in sling for comfort   keep splint clean and china  Ice, elevate extremity  Active movement of fingers encouraged  Need for surgical intervention in future discussed with patient  Follow up with Dr. Mckeon within 1 week, call office for appointment  Ortho stable for DC 53y Female presents c/o R wrist pain sp mechanical fall.  Pt is a  and claimed she was taking out the garbage and slipped on a wet ground due to heavy rain. She slipped backwards and landed on her right hand in order to prevent herself from hitting her head. Denies Headstrike/LOC. Denies numbness, tingling paresthesias in affected extremity. Able to ambulate after fall. No other orthopedic injuries at this time    PAST MEDICAL & SURGICAL HISTORY:    MEDICATIONS  (STANDING):    Allergies    Allergy Status Unknown    Intolerances    Imaging: XR shows right distal radius and ulna fracture    Vital Signs Last 24 Hrs  T(C): 37.6 (12-20-18 @ 20:03), Max: 37.6 (12-20-18 @ 20:03)  T(F): 99.7 (12-20-18 @ 20:03), Max: 99.7 (12-20-18 @ 20:03)  HR: 110 (12-20-18 @ 20:03) (110 - 110)  BP: 168/100 (12-20-18 @ 20:03) (168/100 - 168/100)  BP(mean): --  RR: 18 (12-20-18 @ 20:03) (18 - 18)  SpO2: 97% (12-20-18 @ 20:03) (97% - 97%)  Gen: NAD  RUE: Skin intact, +gross deformity of the wrist, +ain/pin/m/r/u function, SILT C5-T1, radial pulse intact, compartments soft, brisk cap refill     Secondary Survey: Full ROM of unaffected extremities, SILT globally, compartments soft, no bony TTP over bony prominences, no calf TTP, able to SLR with bilateral LE, no TTP along axial spine    Procedure: 10 cc 1% lidocaine hematoma block injected under sterile procedure into R wrist. Closed reduction and placement of a sugartong splint performed. Post procedure imaging demonstrates appropriately reduced distal radius. Post procedure exam demonstrated NV intact.    A/P: 53y Female w R distal radius fracture sp closed reduction and placement of sugartong splint  Pain control  NWB RUE in sling for comfort   keep splint clean and china  Ice, elevate extremity  Active movement of fingers encouraged  Need for surgical intervention in future discussed with patient  Follow up with Dr. Mckeon within 1 week, call office for appointment  Ortho stable for DC

## 2018-12-21 NOTE — REVIEW OF SYSTEMS
[Joint Pain] : joint pain [Back Pain] : back pain [Fever] : no fever [Night Sweats] : no night sweats [Chest Pain] : no chest pain [Palpitations] : no palpitations [Shortness Of Breath] : no shortness of breath [Wheezing] : no wheezing [Abdominal Pain] : no abdominal pain

## 2018-12-27 ENCOUNTER — EMERGENCY (EMERGENCY)
Facility: HOSPITAL | Age: 53
LOS: 1 days | End: 2018-12-27
Attending: EMERGENCY MEDICINE
Payer: MEDICAID

## 2018-12-27 VITALS
WEIGHT: 184.09 LBS | DIASTOLIC BLOOD PRESSURE: 88 MMHG | TEMPERATURE: 98 F | SYSTOLIC BLOOD PRESSURE: 155 MMHG | OXYGEN SATURATION: 99 % | RESPIRATION RATE: 18 BRPM | HEIGHT: 68 IN | HEART RATE: 81 BPM

## 2018-12-27 DIAGNOSIS — Z90.710 ACQUIRED ABSENCE OF BOTH CERVIX AND UTERUS: Chronic | ICD-10-CM

## 2018-12-27 DIAGNOSIS — Z98.89 OTHER SPECIFIED POSTPROCEDURAL STATES: Chronic | ICD-10-CM

## 2018-12-27 DIAGNOSIS — Z98.51 TUBAL LIGATION STATUS: Chronic | ICD-10-CM

## 2018-12-27 PROCEDURE — 73090 X-RAY EXAM OF FOREARM: CPT | Mod: 26,LT

## 2018-12-27 PROCEDURE — 99284 EMERGENCY DEPT VISIT MOD MDM: CPT

## 2018-12-27 PROCEDURE — 73110 X-RAY EXAM OF WRIST: CPT | Mod: 26,RT

## 2018-12-27 PROCEDURE — 73090 X-RAY EXAM OF FOREARM: CPT

## 2018-12-27 PROCEDURE — 99283 EMERGENCY DEPT VISIT LOW MDM: CPT

## 2018-12-27 PROCEDURE — 73110 X-RAY EXAM OF WRIST: CPT

## 2018-12-27 RX ORDER — ACETAMINOPHEN 500 MG
975 TABLET ORAL ONCE
Qty: 0 | Refills: 0 | Status: COMPLETED | OUTPATIENT
Start: 2018-12-27 | End: 2018-12-27

## 2018-12-27 RX ADMIN — Medication 975 MILLIGRAM(S): at 14:21

## 2018-12-27 NOTE — ED ADULT NURSE NOTE - PMH
Benign breast lumps, left    HTN (hypertension)  pt states recently noted ; pt denies rx  Hypercholesterolemia    Iron deficiency anemia    No pertinent past medical history    Thyroid nodule  left  Uterine leiomyoma

## 2018-12-27 NOTE — ED PROVIDER NOTE - MEDICAL DECISION MAKING DETAILS
Attending note-right wrist fracture one week ago the patient not able to followup with orthopedics. A repeat x-ray in orthopedic consultation.

## 2018-12-27 NOTE — ED PROVIDER NOTE - OBJECTIVE STATEMENT
Attending note. Patient was seen in the fast track room #2. Patient fractured her right wrist one week ago. She was seen in the emergency department and reduction was performed by orthopedics. Patient was referred to Dr. Mckeon and total knee surgery within 3 or 4 days. Due to [place] patient was not able to be seen by orthopedics. Other orthopedic group would not take her due to insurance issues. Patient returned to the ER today for orthopedic evaluation and possible surgery. She complains of paresthesia on the dorsum of the right now. She continues to have pain in the wrist. Patient is currently wearing a sugar tong splint on the right arm.

## 2018-12-27 NOTE — ED PROVIDER NOTE - NSFOLLOWUPINSTRUCTIONS_ED_ALL_ED_FT
Please follow up with the recommended surgeons on the attached paperwork within 7 days.      Please return to the ER immediately if you are having out of control pain not relieved by medication, cold fingers, or numbness in your hand or arm.

## 2018-12-27 NOTE — ED ADULT NURSE NOTE - NSIMPLEMENTINTERV_GEN_ALL_ED
Implemented All Fall Risk Interventions:  [place] to call system. Call bell, personal items and telephone within reach. Instruct patient to call for assistance. Room bathroom lighting operational. Non-slip footwear when patient is off stretcher. Physically safe environment: no spills, clutter or unnecessary equipment. Stretcher in lowest position, wheels locked, appropriate side rails in place. Provide visual cue, wrist band, yellow gown, etc. Monitor gait and stability. Monitor for mental status changes and reorient to person, place, and time. Review medications for side effects contributing to fall risk. Reinforce activity limits and safety measures with patient and family.

## 2018-12-27 NOTE — ED ADULT NURSE NOTE - OBJECTIVE STATEMENT
53 year old Female presents to the ED for right arm pain. Yak[place] speaking only with family at bedside to translate. Hx slipped and fell last Thursday - was seen in ED, cast on R arm for fx wrist and elbow from last Thursday. As per family pt was told to get surgery next within 3-4 days - was unable to d/t insurance problems and places that accept insurance are not open d/t holidays. Pt has been taking oxycodone for pain - partial relief. Pt did not take anything for pain today. +sensation. decreased motion on R thumb. Pt reports pain on R wrist. Pt is in no current distress. Comfort and safety provided. Will continue to monitor. Awaiting ED MD consult.

## 2018-12-27 NOTE — ED PROVIDER NOTE - PHYSICAL EXAMINATION
Attending note. Patient is alert and in no acute distress. Patient is wearing a sugar tong splint on the right forearm. There is no proximal arm tenderness or swelling. Patient has slight swelling of all digits. There is some paresthesia on the dorsum of the right thumb. Patient has good capillary refill. Digits are pink and warm.

## 2018-12-27 NOTE — ED ADULT NURSE NOTE - PSH
H/O abdominal hysterectomy  2016  No significant past surgical history    S/P breast biopsy, left  3/18 ; pt states " benign "  S/P tubal ligation  24 yrs ago

## 2018-12-28 ENCOUNTER — APPOINTMENT (OUTPATIENT)
Dept: INTERNAL MEDICINE | Facility: CLINIC | Age: 53
End: 2018-12-28

## 2019-01-03 ENCOUNTER — EMERGENCY (EMERGENCY)
Facility: HOSPITAL | Age: 54
LOS: 1 days | Discharge: HOME | End: 2019-01-03
Attending: EMERGENCY MEDICINE
Payer: MEDICAID

## 2019-01-03 VITALS
RESPIRATION RATE: 20 BRPM | WEIGHT: 184.09 LBS | HEART RATE: 108 BPM | OXYGEN SATURATION: 98 % | SYSTOLIC BLOOD PRESSURE: 134 MMHG | TEMPERATURE: 99 F | HEIGHT: 65.75 IN | DIASTOLIC BLOOD PRESSURE: 86 MMHG

## 2019-01-03 VITALS
HEART RATE: 87 BPM | RESPIRATION RATE: 16 BRPM | DIASTOLIC BLOOD PRESSURE: 95 MMHG | OXYGEN SATURATION: 99 % | SYSTOLIC BLOOD PRESSURE: 144 MMHG

## 2019-01-03 DIAGNOSIS — Z90.710 ACQUIRED ABSENCE OF BOTH CERVIX AND UTERUS: Chronic | ICD-10-CM

## 2019-01-03 DIAGNOSIS — Z98.89 OTHER SPECIFIED POSTPROCEDURAL STATES: Chronic | ICD-10-CM

## 2019-01-03 DIAGNOSIS — Z98.51 TUBAL LIGATION STATUS: Chronic | ICD-10-CM

## 2019-01-03 DIAGNOSIS — S62.109A FRACTURE OF UNSPECIFIED CARPAL BONE, UNSPECIFIED WRIST, INITIAL ENCOUNTER FOR CLOSED FRACTURE: ICD-10-CM

## 2019-01-03 PROCEDURE — 99284 EMERGENCY DEPT VISIT MOD MDM: CPT

## 2019-01-03 PROCEDURE — 73090 X-RAY EXAM OF FOREARM: CPT

## 2019-01-03 PROCEDURE — 99283 EMERGENCY DEPT VISIT LOW MDM: CPT | Mod: 25

## 2019-01-03 PROCEDURE — 73090 X-RAY EXAM OF FOREARM: CPT | Mod: 26,LT

## 2019-01-03 PROCEDURE — 73110 X-RAY EXAM OF WRIST: CPT | Mod: 26,RT

## 2019-01-03 PROCEDURE — 90471 IMMUNIZATION ADMIN: CPT

## 2019-01-03 PROCEDURE — 73110 X-RAY EXAM OF WRIST: CPT

## 2019-01-03 RX ORDER — OXYCODONE HYDROCHLORIDE 5 MG/1
5 TABLET ORAL ONCE
Qty: 0 | Refills: 0 | Status: DISCONTINUED | OUTPATIENT
Start: 2019-01-03 | End: 2019-01-03

## 2019-01-03 RX ORDER — OXYCODONE HYDROCHLORIDE 5 MG/1
1 TABLET ORAL
Qty: 12 | Refills: 0 | OUTPATIENT
Start: 2019-01-03 | End: 2019-01-06

## 2019-01-03 RX ADMIN — OXYCODONE HYDROCHLORIDE 5 MILLIGRAM(S): 5 TABLET ORAL at 11:01

## 2019-01-03 RX ADMIN — OXYCODONE HYDROCHLORIDE 5 MILLIGRAM(S): 5 TABLET ORAL at 10:09

## 2019-01-03 NOTE — ED ADULT NURSE NOTE - NSIMPLEMENTINTERV_GEN_ALL_ED
Implemented All Fall Risk Interventions:  [place] to call system. Call bell, personal items and telephone within reach. Instruct patient to call for assistance. Room bathroom lighting operational. Non-slip footwear when patient is off stretcher. Physically safe environment: no spills, clutter or unnecessary equipment. Stretcher in lowest position, wheels locked, appropriate side rails in place. Provide visual cue, wrist band, yellow gown, etc. Monitor gait and stability. Monitor for mental status changes and reorient to person, place, and time. Review medications for side effects contributing to fall risk. Reinforce activity limits and safety measures with patient and family.

## 2019-01-03 NOTE — ED PROVIDER NOTE - OBJECTIVE STATEMENT
54 y/o female with PMHx HTN presented to the ED for worsening left wrist pain after sustaining comminuted intra-articular distal radius fracture and displaced ulnar styloid fracture on 12/20/18. Patient stated she has been taking ibuprofen every 6 hours with minimal relief of symptoms. Pain rated 10/10. Has pain with flexing and extending fingers. Been in sling and keeping wrist elevated. Patient has not followed up with ortho as she has difficulty scheduling an appointment. Patient denied numbness, paresthesias, CP, SOB, abdominal pain, swelling of digits

## 2019-01-03 NOTE — ED PROVIDER NOTE - PLAN OF CARE
Follow up with your Primary Care Physician within the next 2-3 days  Bring a copy of your test results with you to your appointment  Continue your current medication regimen  Return to the Emergency Room if you experience new or worsening symptoms   Patient to follow up with orthopedic Dr. Kimmy Cuellar [phone]  Patient to take Ibuprofen 600mg every 6 hours as needed for pain and if no improvement of pain patient may take oxycodone 5mg every 6 hours (do not consume alcohol or drive while taking oxycodone) Follow up with your Primary Care Physician within the next 2-3 days  Bring a copy of your test results with you to your appointment  Continue your current medication regimen  Return to the Emergency Room if you experience new or worsening symptoms   Patient to follow up with orthopedic Dr. Kimmy Cuellar [phone]  Patient to take Ibuprofen 600mg every 6 hours as needed for pain and if no improvement of pain patient may take oxycodone 5mg every 6 hours (do not consume alcohol or drive while taking oxycodone)  Rest ice and elevate your arm. Ice packs can be placed over your splint. Keep splint intact and dry until follow up Follow up with your Primary Care Physician within the next 2-3 days  Bring a copy of your test results with you to your appointment  Continue your current medication regimen  Return to the Emergency Room if you experience new or worsening symptoms   Patient to follow up with orthopedic Dr. Kimmy Cuellar [phone] TODAY AT 4:15PM AT [address] [place] [address]  Patient to take Ibuprofen 600mg every 6 hours as needed for pain and if no improvement of pain patient may take oxycodone 5mg every 6 hours (do not consume alcohol or drive while taking oxycodone)  Rest ice and elevate your arm. Ice packs can be placed over your splint. Keep splint intact and dry until follow up

## 2019-01-03 NOTE — ED PROVIDER NOTE - PHYSICAL EXAMINATION
splint in place and intact  There is no proximal arm tenderness or swelling. Patient has slight swelling of all digits. There is some paresthesia on the dorsum of the right thumb

## 2019-01-03 NOTE — CONSULT NOTE ADULT - SUBJECTIVE AND OBJECTIVE BOX
53y Female presents with R DR fx after [facility] fall s/p CR and splinting ~2 weeks ago. She was told to follow up for likely surgical intervention but says she was not able to get appointment. Her pain is gradually improving. Denies numbness, tingling paresthesias in affected extremity. No other orthopedic injuries at this time. No interval changes. She's been compliant with splint and NWB status.    PAST MEDICAL & SURGICAL HISTORY:    MEDICATIONS  (STANDING):    Allergies    Allergy Status Unknown    Intolerances    Gen: NAD  RUE:  splint c/d/i  compartments soft  mild hand/finger swelling  motor/sensory intact distally  wwp, BCR    T(C): 36.8 (01-03-19 @ 09:20), Max: 37 (01-03-19 @ 08:34)  HR: 87 (01-03-19 @ 10:57) (87 - 110)  BP: 144/95 (01-03-19 @ 10:57) (134/86 - 156/97)  RR: 16 (01-03-19 @ 10:57) (16 - 20)  SpO2: 99% (01-03-19 @ 10:57) (98% - 99%)  Wt(kg): --    Imaging: XR shows right DR facture in unchanged alignment compared to post reduction films from 12/21/18    A/P: 53y Female w R distal radius fracture sp closed reduction and placement of sugartong splint  Pain control  NWB RUE in sling for comfort   keep splint clean and china  Ice, elevate extremity  Active movement of fingers encouraged  Need for surgical intervention discussed with patient  Follow up with orthopedics as outpatient, call [phone] for appointment

## 2019-01-03 NOTE — ED PROVIDER NOTE - PROGRESS NOTE DETAILS
IBETH Jaimes: pain improved with oxycodone. Patient does not have insurance and rep is trying to assist for emergency medicaid. Patient stated she will pay out of pocket if need be for outpt ortho appointment. Made patient aware appointment can cost over $350 according to Dr. Lynne  and patient states she will pay out of pocket IBETH Jaimes: made appt for patient today with Dr. Cuellar at [address] [facility] in [facility]

## 2019-01-03 NOTE — ED ADULT NURSE NOTE - OBJECTIVE STATEMENT
54 yo F presents to the ED from home A&Ox3 with a PSHx of nodule s/p thyroidectomy with a C/O right hand pain s/p  fracture since December 27,2018 and currently in a splint. Pt states pain is a 10/10, sensation intact on all fingers. Pt endorses shooting pain on the right arm, states pain is a 10/10 scale, pain is constant, medicated with ibuprofen and relief lasts for 30 minutes. Pt denies numbness, tingling, dizziness, HA, chest pain, SOB, N/V/D. Family member at bedside. 52 yo F presents to the ED from home A&Ox3 with a PSHx of nodule s/p thyroidectomy with a C/O right hand pain s/p  fracture since December 20,2018 and currently in a splint. Pt states pain is a 10/10, sensation intact on all fingers. Pt endorses shooting pain on the right arm, states pain is a 10/10 scale, pain is constant, medicated with ibuprofen and relief lasts for 30 minutes. Pt denies numbness, tingling, dizziness, HA, chest pain, SOB, N/V/D. Family member at bedside.

## 2019-01-03 NOTE — ED PROVIDER NOTE - CARE PLAN
Assessment and plan of treatment:	Follow up with your Primary Care Physician within the next 2-3 days  Bring a copy of your test results with you to your appointment  Continue your current medication regimen  Return to the Emergency Room if you experience new or worsening symptoms   Patient to follow up with orthopedic Dr. Kimmy Cuellar [phone]  Patient to take Ibuprofen 600mg every 6 hours as needed for pain and if no improvement of pain patient may take oxycodone 5mg every 6 hours (do not consume alcohol or drive while taking oxycodone) Assessment and plan of treatment:	Follow up with your Primary Care Physician within the next 2-3 days  Bring a copy of your test results with you to your appointment  Continue your current medication regimen  Return to the Emergency Room if you experience new or worsening symptoms   Patient to follow up with orthopedic Dr. Kimmy Cuellar [phone]  Patient to take Ibuprofen 600mg every 6 hours as needed for pain and if no improvement of pain patient may take oxycodone 5mg every 6 hours (do not consume alcohol or drive while taking oxycodone)  Rest ice and elevate your arm. Ice packs can be placed over your splint. Keep splint intact and dry until follow up Assessment and plan of treatment:	Follow up with your Primary Care Physician within the next 2-3 days  Bring a copy of your test results with you to your appointment  Continue your current medication regimen  Return to the Emergency Room if you experience new or worsening symptoms   Patient to follow up with orthopedic Dr. Kimmy Cuellar [phone] TODAY AT 4:15PM AT [address] [place] [address]  Patient to take Ibuprofen 600mg every 6 hours as needed for pain and if no improvement of pain patient may take oxycodone 5mg every 6 hours (do not consume alcohol or drive while taking oxycodone)  Rest ice and elevate your arm. Ice packs can be placed over your splint. Keep splint intact and dry until follow up Principal Discharge DX:	Colles' fracture of left radius, subsequent encounter for closed fracture with routine healing  Assessment and plan of treatment:	Follow up with your Primary Care Physician within the next 2-3 days  Bring a copy of your test results with you to your appointment  Continue your current medication regimen  Return to the Emergency Room if you experience new or worsening symptoms   Patient to follow up with orthopedic Dr. Kimmy Cuellar [phone] TODAY AT 4:15PM AT [address] [place] [address]  Patient to take Ibuprofen 600mg every 6 hours as needed for pain and if no improvement of pain patient may take oxycodone 5mg every 6 hours (do not consume alcohol or drive while taking oxycodone)  Rest ice and elevate your arm. Ice packs can be placed over your splint. Keep splint intact and dry until follow up

## 2019-01-03 NOTE — ED PROVIDER NOTE - NSFOLLOWUPINSTRUCTIONS_ED_ALL_ED_FT
Follow up with your Primary Care Physician within the next 2-3 days  Bring a copy of your test results with you to your appointment  Continue your current medication regimen  Return to the Emergency Room if you experience new or worsening symptoms   Patient to follow up with orthopedic Dr. Kimmy Cuellar [phone] TODAY AT 4:15PM AT [address] [place] [address]  Patient to take Ibuprofen 600mg every 6 hours as needed for pain and if no improvement of pain patient may take oxycodone 5mg every 6 hours (do not consume alcohol or drive while taking oxycodone)  Rest ice and elevate your arm. Ice packs can be placed over your splint. Keep splint intact and dry until follow up

## 2019-01-03 NOTE — ED PROVIDER NOTE - ATTENDING CONTRIBUTION TO CARE
53y female pmh rt colles fx 12/20 in splint, with pain. Not able to get follow up yet, comes to ed complains of persistent pain. No other complaints. No fever chills,cp.sob.htn,hld,dm, PE WDWN female awake alert normocephalic atraumatic chest clear anterior & posterior abd soft neuro neg, cv no rubs, gallops or murmurs,rt forearm splint. distal sensation intact. flexor extensrs intact.  Andres Maloney MD, Facep

## 2019-01-04 ENCOUNTER — APPOINTMENT (OUTPATIENT)
Dept: INTERNAL MEDICINE | Facility: CLINIC | Age: 54
End: 2019-01-04

## 2019-01-04 ENCOUNTER — OUTPATIENT (OUTPATIENT)
Dept: OUTPATIENT SERVICES | Facility: HOSPITAL | Age: 54
LOS: 1 days | End: 2019-01-04
Payer: SELF-PAY

## 2019-01-04 VITALS — HEIGHT: 64 IN | DIASTOLIC BLOOD PRESSURE: 80 MMHG | SYSTOLIC BLOOD PRESSURE: 138 MMHG

## 2019-01-04 DIAGNOSIS — Z98.89 OTHER SPECIFIED POSTPROCEDURAL STATES: Chronic | ICD-10-CM

## 2019-01-04 DIAGNOSIS — Z98.51 TUBAL LIGATION STATUS: Chronic | ICD-10-CM

## 2019-01-04 DIAGNOSIS — I10 ESSENTIAL (PRIMARY) HYPERTENSION: ICD-10-CM

## 2019-01-04 DIAGNOSIS — Z90.710 ACQUIRED ABSENCE OF BOTH CERVIX AND UTERUS: Chronic | ICD-10-CM

## 2019-01-04 PROCEDURE — G0463: CPT

## 2019-01-04 NOTE — HISTORY OF PRESENT ILLNESS
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 Patient reports having fall while working on to outstretched right hand and breaking her wrist (in the driveway outdoors). She went to [facility] ED 12/20/18 and 1/3/18 and was told there were 3 areas of fracture, received splinting/sing and told that she would need urgent surgery in 3d from fracture, given oxycodone for pain control and instructed to f/up outpatient . Patient continues to have severe pain and numbness however numbness is better. She came to PCP because she did not know what to do. \par

## 2019-01-04 NOTE — PHYSICAL EXAM
[Well Nourished] : well nourished [No Respiratory Distress] : no respiratory distress  [Clear to Auscultation] : lungs were clear to auscultation bilaterally [Normal Rate] : normal rate  [Regular Rhythm] : with a regular rhythm [Normal S1, S2] : normal S1 and S2 [No Acute Distress] : no acute distress [Normal Sclera/Conjunctiva] : normal sclera/conjunctiva [PERRL] : pupils equal round and reactive to light [EOMI] : extraocular movements intact [Normal Outer Ear/Nose] : the outer ears and nose were normal in appearance [Normal Oropharynx] : the oropharynx was normal [No JVD] : no jugular venous distention [Supple] : supple [No Edema] : there was no peripheral edema [No Extremity Clubbing/Cyanosis] : no extremity clubbing/cyanosis [Soft] : abdomen soft [Non Tender] : non-tender [No HSM] : no HSM [Normal Bowel Sounds] : normal bowel sounds [Normal Posterior Cervical Nodes] : no posterior cervical lymphadenopathy [Normal Anterior Cervical Nodes] : no anterior cervical lymphadenopathy [No CVA Tenderness] : no CVA  tenderness [No Spinal Tenderness] : no spinal tenderness [No Rash] : no rash [No Skin Lesions] : no skin lesions [Normal Gait] : normal gait [Coordination Grossly Intact] : coordination grossly intact [Speech Grossly Normal] : speech grossly normal [Memory Grossly Normal] : memory grossly normal [Normal Affect] : the affect was normal [de-identified] : right forearm closed reduction in cast, able to move fingers however pain when doing so, reports numbness to light palpation throughout entire hand, limited ROM of R hand

## 2019-01-04 NOTE — ASSESSMENT
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 radial fracture\par \par - Recent visited ER on 1/3, prescribed oxycodone and given orthopaedic clinic\par - Orthopaedic surgery referral given\par - Confirmed orthopaedic surgery appointment on Tue 1/9/18 at 9:00AM. Patient will have to be seen privately since the event occurred while working, in other words, employer will pay for visit and imaging studies (Workers compensation per resident orthopedic clinic)\par \par \par -ADDENDUM: patint will be seen in orthopaedic resident clinic on 1/9/18 for further evaluation\par d/w Dr. Ma

## 2019-01-04 NOTE — COUNSELING
[Weight management counseling provided] : Weight management [Smoking cessation counseling provided] : smoking cessation

## 2019-01-04 NOTE — REVIEW OF SYSTEMS
[Joint Pain] : joint pain [Fever] : no fever [Night Sweats] : no night sweats [Earache] : no earache [Nasal Discharge] : no nasal discharge [Chest Pain] : no chest pain [Palpitations] : no palpitations [Orthopnea] : no orthopnea [Shortness Of Breath] : no shortness of breath [Wheezing] : no wheezing [Abdominal Pain] : no abdominal pain [Vomiting] : no vomiting [Dysuria] : no dysuria [Hematuria] : no hematuria [Back Pain] : no back pain [Itching] : no itching [Skin Rash] : no skin rash [Headache] : no headache [Memory Loss] : no memory loss [Suicidal] : not suicidal [Easy Bleeding] : no easy bleeding

## 2019-01-08 ENCOUNTER — APPOINTMENT (OUTPATIENT)
Dept: ORTHOPEDIC SURGERY | Facility: CLINIC | Age: 54
End: 2019-01-08

## 2019-01-09 ENCOUNTER — APPOINTMENT (OUTPATIENT)
Dept: ORTHOPEDIC SURGERY | Facility: HOSPITAL | Age: 54
End: 2019-01-09

## 2019-01-09 ENCOUNTER — TRANSCRIPTION ENCOUNTER (OUTPATIENT)
Age: 54
End: 2019-01-09

## 2019-01-09 ENCOUNTER — INPATIENT (INPATIENT)
Facility: HOSPITAL | Age: 54
LOS: 1 days | Discharge: HOME | DRG: 512 | End: 2019-01-11
Attending: ORTHOPAEDIC SURGERY | Admitting: ORTHOPAEDIC SURGERY
Payer: MEDICAID

## 2019-01-09 ENCOUNTER — OUTPATIENT (OUTPATIENT)
Dept: OUTPATIENT SERVICES | Facility: HOSPITAL | Age: 54
LOS: 1 days | End: 2019-01-09
Payer: SELF-PAY

## 2019-01-09 VITALS
WEIGHT: 184.09 LBS | HEIGHT: 63 IN | SYSTOLIC BLOOD PRESSURE: 137 MMHG | HEART RATE: 100 BPM | RESPIRATION RATE: 18 BRPM | DIASTOLIC BLOOD PRESSURE: 82 MMHG | OXYGEN SATURATION: 99 % | TEMPERATURE: 98 F

## 2019-01-09 VITALS
HEART RATE: 91 BPM | DIASTOLIC BLOOD PRESSURE: 84 MMHG | BODY MASS INDEX: 31.41 KG/M2 | WEIGHT: 184 LBS | SYSTOLIC BLOOD PRESSURE: 119 MMHG | HEIGHT: 64 IN

## 2019-01-09 DIAGNOSIS — Z98.51 TUBAL LIGATION STATUS: Chronic | ICD-10-CM

## 2019-01-09 DIAGNOSIS — Z90.710 ACQUIRED ABSENCE OF BOTH CERVIX AND UTERUS: Chronic | ICD-10-CM

## 2019-01-09 DIAGNOSIS — Z98.89 OTHER SPECIFIED POSTPROCEDURAL STATES: Chronic | ICD-10-CM

## 2019-01-09 DIAGNOSIS — S52.571G: ICD-10-CM

## 2019-01-09 DIAGNOSIS — M79.609 PAIN IN UNSPECIFIED LIMB: ICD-10-CM

## 2019-01-09 LAB
ALBUMIN SERPL ELPH-MCNC: 4.4 G/DL — SIGNIFICANT CHANGE UP (ref 3.3–5)
ALP SERPL-CCNC: 120 U/L — SIGNIFICANT CHANGE UP (ref 40–120)
ALT FLD-CCNC: 58 U/L — HIGH (ref 10–45)
ANION GAP SERPL CALC-SCNC: 11 MMOL/L — SIGNIFICANT CHANGE UP (ref 5–17)
APPEARANCE UR: CLEAR — SIGNIFICANT CHANGE UP
APTT BLD: 28.1 SEC — SIGNIFICANT CHANGE UP (ref 27.5–36.3)
AST SERPL-CCNC: 27 U/L — SIGNIFICANT CHANGE UP (ref 10–40)
BACTERIA # UR AUTO: NEGATIVE — SIGNIFICANT CHANGE UP
BASOPHILS # BLD AUTO: 0 K/UL — SIGNIFICANT CHANGE UP (ref 0–0.2)
BASOPHILS NFR BLD AUTO: 0.3 % — SIGNIFICANT CHANGE UP (ref 0–2)
BILIRUB SERPL-MCNC: 0.3 MG/DL — SIGNIFICANT CHANGE UP (ref 0.2–1.2)
BILIRUB UR-MCNC: NEGATIVE — SIGNIFICANT CHANGE UP
BLD GP AB SCN SERPL QL: NEGATIVE — SIGNIFICANT CHANGE UP
BUN SERPL-MCNC: 11 MG/DL — SIGNIFICANT CHANGE UP (ref 7–23)
CALCIUM SERPL-MCNC: 9.8 MG/DL — SIGNIFICANT CHANGE UP (ref 8.4–10.5)
CHLORIDE SERPL-SCNC: 102 MMOL/L — SIGNIFICANT CHANGE UP (ref 96–108)
CO2 SERPL-SCNC: 26 MMOL/L — SIGNIFICANT CHANGE UP (ref 22–31)
COLOR SPEC: SIGNIFICANT CHANGE UP
CREAT SERPL-MCNC: 0.54 MG/DL — SIGNIFICANT CHANGE UP (ref 0.5–1.3)
DIFF PNL FLD: ABNORMAL
EOSINOPHIL # BLD AUTO: 0.1 K/UL — SIGNIFICANT CHANGE UP (ref 0–0.5)
EOSINOPHIL NFR BLD AUTO: 1.3 % — SIGNIFICANT CHANGE UP (ref 0–6)
EPI CELLS # UR: 1 /HPF — SIGNIFICANT CHANGE UP
GLUCOSE SERPL-MCNC: 95 MG/DL — SIGNIFICANT CHANGE UP (ref 70–99)
GLUCOSE UR QL: NEGATIVE — SIGNIFICANT CHANGE UP
HCT VFR BLD CALC: 41.1 % — SIGNIFICANT CHANGE UP (ref 34.5–45)
HGB BLD-MCNC: 14.6 G/DL — SIGNIFICANT CHANGE UP (ref 11.5–15.5)
HYALINE CASTS # UR AUTO: 0 /LPF — SIGNIFICANT CHANGE UP (ref 0–2)
INR BLD: 1.03 RATIO — SIGNIFICANT CHANGE UP (ref 0.88–1.16)
KETONES UR-MCNC: NEGATIVE — SIGNIFICANT CHANGE UP
LEUKOCYTE ESTERASE UR-ACNC: NEGATIVE — SIGNIFICANT CHANGE UP
LYMPHOCYTES # BLD AUTO: 1.4 K/UL — SIGNIFICANT CHANGE UP (ref 1–3.3)
LYMPHOCYTES # BLD AUTO: 28.4 % — SIGNIFICANT CHANGE UP (ref 13–44)
MCHC RBC-ENTMCNC: 30.5 PG — SIGNIFICANT CHANGE UP (ref 27–34)
MCHC RBC-ENTMCNC: 35.6 GM/DL — SIGNIFICANT CHANGE UP (ref 32–36)
MCV RBC AUTO: 85.9 FL — SIGNIFICANT CHANGE UP (ref 80–100)
MONOCYTES # BLD AUTO: 0.3 K/UL — SIGNIFICANT CHANGE UP (ref 0–0.9)
MONOCYTES NFR BLD AUTO: 6.7 % — SIGNIFICANT CHANGE UP (ref 2–14)
NEUTROPHILS # BLD AUTO: 3.2 K/UL — SIGNIFICANT CHANGE UP (ref 1.8–7.4)
NEUTROPHILS NFR BLD AUTO: 63.4 % — SIGNIFICANT CHANGE UP (ref 43–77)
NITRITE UR-MCNC: NEGATIVE — SIGNIFICANT CHANGE UP
PH UR: 6.5 — SIGNIFICANT CHANGE UP (ref 5–8)
PLATELET # BLD AUTO: 226 K/UL — SIGNIFICANT CHANGE UP (ref 150–400)
POTASSIUM SERPL-MCNC: 3.6 MMOL/L — SIGNIFICANT CHANGE UP (ref 3.5–5.3)
POTASSIUM SERPL-SCNC: 3.6 MMOL/L — SIGNIFICANT CHANGE UP (ref 3.5–5.3)
PROT SERPL-MCNC: 7.9 G/DL — SIGNIFICANT CHANGE UP (ref 6–8.3)
PROT UR-MCNC: NEGATIVE — SIGNIFICANT CHANGE UP
PROTHROM AB SERPL-ACNC: 11.7 SEC — SIGNIFICANT CHANGE UP (ref 10–12.9)
RBC # BLD: 4.78 M/UL — SIGNIFICANT CHANGE UP (ref 3.8–5.2)
RBC # FLD: 10.7 % — SIGNIFICANT CHANGE UP (ref 10.3–14.5)
RBC CASTS # UR COMP ASSIST: 3 /HPF — SIGNIFICANT CHANGE UP (ref 0–4)
RH IG SCN BLD-IMP: POSITIVE — SIGNIFICANT CHANGE UP
SODIUM SERPL-SCNC: 139 MMOL/L — SIGNIFICANT CHANGE UP (ref 135–145)
SP GR SPEC: 1.01 — LOW (ref 1.01–1.02)
UROBILINOGEN FLD QL: NEGATIVE — SIGNIFICANT CHANGE UP
WBC # BLD: 5.1 K/UL — SIGNIFICANT CHANGE UP (ref 3.8–10.5)
WBC # FLD AUTO: 5.1 K/UL — SIGNIFICANT CHANGE UP (ref 3.8–10.5)
WBC UR QL: 1 /HPF — SIGNIFICANT CHANGE UP (ref 0–5)

## 2019-01-09 PROCEDURE — 99285 EMERGENCY DEPT VISIT HI MDM: CPT | Mod: 25

## 2019-01-09 PROCEDURE — 93010 ELECTROCARDIOGRAM REPORT: CPT

## 2019-01-09 PROCEDURE — G0463: CPT

## 2019-01-09 PROCEDURE — 71045 X-RAY EXAM CHEST 1 VIEW: CPT | Mod: 26

## 2019-01-09 RX ORDER — MORPHINE SULFATE 50 MG/1
2 CAPSULE, EXTENDED RELEASE ORAL EVERY 4 HOURS
Qty: 0 | Refills: 0 | Status: DISCONTINUED | OUTPATIENT
Start: 2019-01-09 | End: 2019-01-10

## 2019-01-09 RX ORDER — ACETAMINOPHEN 500 MG
650 TABLET ORAL EVERY 6 HOURS
Qty: 0 | Refills: 0 | Status: DISCONTINUED | OUTPATIENT
Start: 2019-01-09 | End: 2019-01-10

## 2019-01-09 RX ORDER — ACETAMINOPHEN 500 MG
975 TABLET ORAL ONCE
Qty: 0 | Refills: 0 | Status: COMPLETED | OUTPATIENT
Start: 2019-01-09 | End: 2019-01-09

## 2019-01-09 RX ORDER — OXYCODONE HYDROCHLORIDE 5 MG/1
10 TABLET ORAL EVERY 4 HOURS
Qty: 0 | Refills: 0 | Status: DISCONTINUED | OUTPATIENT
Start: 2019-01-09 | End: 2019-01-10

## 2019-01-09 RX ORDER — OXYCODONE HYDROCHLORIDE 5 MG/1
5 TABLET ORAL EVERY 4 HOURS
Qty: 0 | Refills: 0 | Status: DISCONTINUED | OUTPATIENT
Start: 2019-01-09 | End: 2019-01-10

## 2019-01-09 RX ORDER — SODIUM CHLORIDE 9 MG/ML
1000 INJECTION, SOLUTION INTRAVENOUS
Qty: 0 | Refills: 0 | Status: DISCONTINUED | OUTPATIENT
Start: 2019-01-09 | End: 2019-01-10

## 2019-01-09 RX ORDER — INFLUENZA VIRUS VACCINE 15; 15; 15; 15 UG/.5ML; UG/.5ML; UG/.5ML; UG/.5ML
0.5 SUSPENSION INTRAMUSCULAR ONCE
Qty: 0 | Refills: 0 | Status: DISCONTINUED | OUTPATIENT
Start: 2019-01-09 | End: 2019-01-11

## 2019-01-09 RX ORDER — OXYCODONE HYDROCHLORIDE 5 MG/1
5 TABLET ORAL ONCE
Qty: 0 | Refills: 0 | Status: DISCONTINUED | OUTPATIENT
Start: 2019-01-09 | End: 2019-01-09

## 2019-01-09 RX ADMIN — Medication 975 MILLIGRAM(S): at 12:03

## 2019-01-09 RX ADMIN — Medication 975 MILLIGRAM(S): at 15:48

## 2019-01-09 RX ADMIN — OXYCODONE HYDROCHLORIDE 5 MILLIGRAM(S): 5 TABLET ORAL at 12:03

## 2019-01-09 RX ADMIN — SODIUM CHLORIDE 75 MILLILITER(S): 9 INJECTION, SOLUTION INTRAVENOUS at 22:39

## 2019-01-09 RX ADMIN — OXYCODONE HYDROCHLORIDE 5 MILLIGRAM(S): 5 TABLET ORAL at 15:48

## 2019-01-09 NOTE — ED PROVIDER NOTE - OBJECTIVE STATEMENT
Rolando Rodas MD: 53 F w/ Hx of HTN, who p/w R wrist pain and hand swelling. She had a distal radius fracture that occurred after a mechanical fall approximately 3 weeks ago, and was told she likely needed surgical intervention. She has been in a sugartong splint. No numbness or weakness, only has burning sensation in the wrist and hand.

## 2019-01-09 NOTE — ED PROVIDER NOTE - NS ED ROS FT
CONST: no fevers  EYES: no redness   HENT: no epistaxis  CV: no chest pain     RESP: no shortness of breath  ABD: no vomiting     : no hematuria   MSK: + R wrist pain     NEURO: no focal weakness or loss of sensation     SKIN:  no rash   ~ Rolando Rodas MD

## 2019-01-09 NOTE — ED ADULT NURSE REASSESSMENT NOTE - NS ED NURSE REASSESS COMMENT FT1
Report given to holding RN Kasey. Patient aware. Patient currently comfortable. VSS.
patient resting comfortably in bed in no acute distress. patient denies pain at this time. given food. waiting for bed
patient resting comfortably in bed in no acute distress. patient states pain is
Report received from CAM Servin.

## 2019-01-09 NOTE — H&P ADULT - HISTORY OF PRESENT ILLNESS
Orthopaedic Surgery Consult Note    53y Female presents with LAWRENCE treadwell after [facility] fall s/p CR and splinting ~3 weeks ago. She was told to follow up for likely surgical intervention but says she was not able to get appointment. Her pain is gradually improving. Denies numbness, tingling paresthesias in affected extremity. No other orthopedic injuries at this time. No interval changes. She's been compliant with splint and NWB status.      PAST MEDICAL & SURGICAL HISTORY:  No pertinent past medical history  Hypercholesterolemia  Thyroid nodule: left  HTN (hypertension): pt states recently noted ; pt denies rx  Benign breast lumps, left  Iron deficiency anemia  Uterine leiomyoma  No significant past surgical history  H/O abdominal hysterectomy: 2016  S/P breast biopsy, left: 3/18 ; pt states &quot; benign &quot;  S/P tubal ligation: 24 yrs ago    [] No significant past history as reviewed with the patient and family    MEDICATIONS  (STANDING):    MEDICATIONS  (PRN):  acetaminophen   Tablet .. 650 milliGRAM(s) Oral every 6 hours PRN Temp greater or equal to 38C (100.4F), Mild Pain (1 - 3)  morphine  - Injectable 2 milliGRAM(s) IV Push every 4 hours PRN Breakthrough  oxyCODONE    IR 10 milliGRAM(s) Oral every 4 hours PRN Severe Pain (7 - 10)  oxyCODONE    IR 5 milliGRAM(s) Oral every 4 hours PRN Moderate Pain (4 - 6)    Allergies    Allergy Status Unknown    Intolerances    lactose (Diarrhea)      Vital Signs Last 24 Hrs  T(C): 36.8 (2019 20:57), Max: 36.9 (2019 11:12)  T(F): 98.2 (2019 20:57), Max: 98.5 (2019 11:12)  HR: 85 (2019 20:57) (85 - 100)  BP: 111/78 (2019 20:57) (111/78 - 137/82)  BP(mean): --  RR: 18 (2019 20:57) (18 - 18)  SpO2: 97% (2019 20:57) (97% - 99%)      PHYSICAL EXAM:  Gen: NAD  RUE:  splint c/d/i  compartments soft  mild hand/finger swelling  motor/sensory intact distally  wwp, BCR                              14.6   5.1   )-----------( 226      ( 2019 12:26 )             41.1         139  |  102  |  11  ----------------------------<  95  3.6   |  26  |  0.54    Ca    9.8      2019 12:26    TPro  7.9  /  Alb  4.4  /  TBili  0.3  /  DBili  x   /  AST  27  /  ALT  58<H>  /  AlkPhos  120      PT/INR - ( 2019 12:26 )   PT: 11.7 sec;   INR: 1.03 ratio         PTT - ( 2019 12:26 )  PTT:28.1 sec  Urinalysis Basic - ( 2019 15:04 )    Color: Light Yellow / Appearance: Clear / S.009 / pH: x  Gluc: x / Ketone: Negative  / Bili: Negative / Urobili: Negative   Blood: x / Protein: Negative / Nitrite: Negative   Leuk Esterase: Negative / RBC: 3 /hpf / WBC 1 /HPF   Sq Epi: x / Non Sq Epi: 1 /hpf / Bacteria: Negative        IMAGING STUDIES:  < from: Xray Wrist 3 Views, Right (19 @ 10:12) >    Impression: There is a comminuted intra-articular distal right radius   fracture which is unchanged in alignment compared to the prior exam.   There is a mildly displaced ulnar styloid fracture which is unchanged in   appearance.    < end of copied text >      53y Female with R distal radius fx  - Pain control  - NPO pMN/IVF  - Hold anticoagulation at midnight  - CBC/BMP/Coags/UA/T+S x2  - EKG/CXR  - Please document medical clearance prior to planned procedure  - Plan for OR for ORIF tomorrow

## 2019-01-09 NOTE — ED PROVIDER NOTE - CARE PLAN
Principal Discharge DX:	Other closed intra-articular fracture of distal end of right radius with delayed healing, subsequent encounter

## 2019-01-09 NOTE — ED PROVIDER NOTE - PRINCIPAL DIAGNOSIS
Other closed intra-articular fracture of distal end of right radius with delayed healing, subsequent encounter

## 2019-01-09 NOTE — ED ADULT NURSE NOTE - OBJECTIVE STATEMENT
54 yo female presents to the ED from home c/o right wrist pain x3 days. patient states she broke right wrist in 3 places 12/20/18 s/p fall and was splinted here at [facility]. patient state she has been having numbness to 5 fingers and increasing wrist pain x3days. she states she went to see clinic upstairs today and sent to ED for possible surgery. patient splinted, right fingers swollen. patient denies fevers, chest pain, SOB, chills, N/V/D, HA, dizziness, cough, abdominal pain, back pain, dysuria. pain to flex and extend right fingers. ORLANDO ALEXIS at the bedside.

## 2019-01-09 NOTE — ED PROVIDER NOTE - PHYSICAL EXAMINATION
Physical Exam:   GEN: adult F who is in no acute distress, AAOx3  HENT: NCAT, MMM, no stridor  EYES: PERRLA, EOMI  RESP: CTAB, no respiratory distress  CV: normal rate and regular rhythm, S1 S2  ABD: soft and NTND  EXT: No edema, No bony deformity of extremities  SKIN: No skin breaks, skin color normal for race  NEURO: CN grossly intact, No focal motor or sensory deficits.   MSK: + RUE in sugartong splint, distally with brisk cap refill, and normal sensation and motor  ~ Rolando Rodas MD

## 2019-01-09 NOTE — ED PROVIDER NOTE - MEDICAL DECISION MAKING DETAILS
Rolando Rodas MD: mechanical fall 3 wks ago w/ R distal radius fx, s/p reduction and splint. pain in the wrist and hand, but NVI , no sign of compartment syndrome. Orthopedics consult, and preop labs. Rolando Rodas MD: mechanical fall 3 wks ago w/ R distal radius fx, s/p reduction and splint. pain in the wrist and hand, but NVI , no sign of compartment syndrome. Orthopedics consult, and preop labs.  Attending Statement: Agree with the above.  Preop labs.  NVI on exam.  Pain Rx.  Ortho to eval.  No s/s of compartment syndrome or hypoperfusion to extremity.  --BMM

## 2019-01-10 ENCOUNTER — APPOINTMENT (OUTPATIENT)
Dept: ORTHOPEDIC SURGERY | Facility: HOSPITAL | Age: 54
End: 2019-01-10

## 2019-01-10 ENCOUNTER — TRANSCRIPTION ENCOUNTER (OUTPATIENT)
Age: 54
End: 2019-01-10

## 2019-01-10 DIAGNOSIS — S52.571G: ICD-10-CM

## 2019-01-10 DIAGNOSIS — E78.00 PURE HYPERCHOLESTEROLEMIA, UNSPECIFIED: ICD-10-CM

## 2019-01-10 DIAGNOSIS — I10 ESSENTIAL (PRIMARY) HYPERTENSION: ICD-10-CM

## 2019-01-10 DIAGNOSIS — E04.1 NONTOXIC SINGLE THYROID NODULE: ICD-10-CM

## 2019-01-10 DIAGNOSIS — S52.531A COLLES' FRACTURE OF RIGHT RADIUS, INITIAL ENCOUNTER FOR CLOSED FRACTURE: ICD-10-CM

## 2019-01-10 DIAGNOSIS — D25.9 LEIOMYOMA OF UTERUS, UNSPECIFIED: ICD-10-CM

## 2019-01-10 DIAGNOSIS — D50.9 IRON DEFICIENCY ANEMIA, UNSPECIFIED: ICD-10-CM

## 2019-01-10 LAB
ANION GAP SERPL CALC-SCNC: 12 MMOL/L — SIGNIFICANT CHANGE UP (ref 5–17)
APTT BLD: 29.4 SEC — SIGNIFICANT CHANGE UP (ref 27.5–36.3)
BLD GP AB SCN SERPL QL: NEGATIVE — SIGNIFICANT CHANGE UP
BUN SERPL-MCNC: 12 MG/DL — SIGNIFICANT CHANGE UP (ref 7–23)
CALCIUM SERPL-MCNC: 9.6 MG/DL — SIGNIFICANT CHANGE UP (ref 8.4–10.5)
CHLORIDE SERPL-SCNC: 103 MMOL/L — SIGNIFICANT CHANGE UP (ref 96–108)
CO2 SERPL-SCNC: 26 MMOL/L — SIGNIFICANT CHANGE UP (ref 22–31)
CREAT SERPL-MCNC: 0.58 MG/DL — SIGNIFICANT CHANGE UP (ref 0.5–1.3)
GLUCOSE SERPL-MCNC: 91 MG/DL — SIGNIFICANT CHANGE UP (ref 70–99)
HCG SERPL-ACNC: <2 MIU/ML — SIGNIFICANT CHANGE UP
HCT VFR BLD CALC: 39.6 % — SIGNIFICANT CHANGE UP (ref 34.5–45)
HGB BLD-MCNC: 13.9 G/DL — SIGNIFICANT CHANGE UP (ref 11.5–15.5)
INR BLD: 1.04 RATIO — SIGNIFICANT CHANGE UP (ref 0.88–1.16)
MCHC RBC-ENTMCNC: 30.4 PG — SIGNIFICANT CHANGE UP (ref 27–34)
MCHC RBC-ENTMCNC: 35.2 GM/DL — SIGNIFICANT CHANGE UP (ref 32–36)
MCV RBC AUTO: 86.3 FL — SIGNIFICANT CHANGE UP (ref 80–100)
PLATELET # BLD AUTO: 207 K/UL — SIGNIFICANT CHANGE UP (ref 150–400)
POTASSIUM SERPL-MCNC: 3.6 MMOL/L — SIGNIFICANT CHANGE UP (ref 3.5–5.3)
POTASSIUM SERPL-SCNC: 3.6 MMOL/L — SIGNIFICANT CHANGE UP (ref 3.5–5.3)
PROTHROM AB SERPL-ACNC: 11.9 SEC — SIGNIFICANT CHANGE UP (ref 10–12.9)
RBC # BLD: 4.59 M/UL — SIGNIFICANT CHANGE UP (ref 3.8–5.2)
RBC # FLD: 11.1 % — SIGNIFICANT CHANGE UP (ref 10.3–14.5)
RH IG SCN BLD-IMP: POSITIVE — SIGNIFICANT CHANGE UP
SODIUM SERPL-SCNC: 141 MMOL/L — SIGNIFICANT CHANGE UP (ref 135–145)
WBC # BLD: 4.4 K/UL — SIGNIFICANT CHANGE UP (ref 3.8–10.5)
WBC # FLD AUTO: 4.4 K/UL — SIGNIFICANT CHANGE UP (ref 3.8–10.5)

## 2019-01-10 PROCEDURE — 25609 OPTX DST RD XART FX/EP SEP3+: CPT | Mod: RT

## 2019-01-10 PROCEDURE — 93010 ELECTROCARDIOGRAM REPORT: CPT

## 2019-01-10 PROCEDURE — 25290 INCISE WRIST/FOREARM TENDON: CPT | Mod: RT

## 2019-01-10 RX ORDER — MORPHINE SULFATE 50 MG/1
2 CAPSULE, EXTENDED RELEASE ORAL EVERY 4 HOURS
Qty: 0 | Refills: 0 | Status: DISCONTINUED | OUTPATIENT
Start: 2019-01-10 | End: 2019-01-11

## 2019-01-10 RX ORDER — OXYCODONE HYDROCHLORIDE 5 MG/1
10 TABLET ORAL EVERY 4 HOURS
Qty: 0 | Refills: 0 | Status: DISCONTINUED | OUTPATIENT
Start: 2019-01-10 | End: 2019-01-11

## 2019-01-10 RX ORDER — ACETAMINOPHEN 500 MG
650 TABLET ORAL EVERY 6 HOURS
Qty: 0 | Refills: 0 | Status: DISCONTINUED | OUTPATIENT
Start: 2019-01-10 | End: 2019-01-11

## 2019-01-10 RX ORDER — OXYCODONE HYDROCHLORIDE 5 MG/1
5 TABLET ORAL EVERY 4 HOURS
Qty: 0 | Refills: 0 | Status: DISCONTINUED | OUTPATIENT
Start: 2019-01-10 | End: 2019-01-11

## 2019-01-10 RX ORDER — FAMOTIDINE 10 MG/ML
20 INJECTION INTRAVENOUS ONCE
Qty: 0 | Refills: 0 | Status: COMPLETED | OUTPATIENT
Start: 2019-01-10 | End: 2019-01-10

## 2019-01-10 RX ORDER — SODIUM CHLORIDE 9 MG/ML
1000 INJECTION INTRAMUSCULAR; INTRAVENOUS; SUBCUTANEOUS ONCE
Qty: 0 | Refills: 0 | Status: COMPLETED | OUTPATIENT
Start: 2019-01-10 | End: 2019-01-10

## 2019-01-10 RX ORDER — CEFAZOLIN SODIUM 1 G
2000 VIAL (EA) INJECTION EVERY 8 HOURS
Qty: 0 | Refills: 0 | Status: COMPLETED | OUTPATIENT
Start: 2019-01-10 | End: 2019-01-11

## 2019-01-10 RX ORDER — DOCUSATE SODIUM 100 MG
100 CAPSULE ORAL THREE TIMES A DAY
Qty: 0 | Refills: 0 | Status: DISCONTINUED | OUTPATIENT
Start: 2019-01-10 | End: 2019-01-11

## 2019-01-10 RX ORDER — ONDANSETRON 8 MG/1
4 TABLET, FILM COATED ORAL EVERY 6 HOURS
Qty: 0 | Refills: 0 | Status: DISCONTINUED | OUTPATIENT
Start: 2019-01-10 | End: 2019-01-11

## 2019-01-10 RX ORDER — ONDANSETRON 8 MG/1
4 TABLET, FILM COATED ORAL ONCE
Qty: 0 | Refills: 0 | Status: COMPLETED | OUTPATIENT
Start: 2019-01-10 | End: 2019-01-10

## 2019-01-10 RX ORDER — OXYCODONE AND ACETAMINOPHEN 5; 325 MG/1; MG/1
1 TABLET ORAL
Qty: 40 | Refills: 0
Start: 2019-01-10 | End: 2019-01-16

## 2019-01-10 RX ORDER — MAGNESIUM HYDROXIDE 400 MG/1
30 TABLET, CHEWABLE ORAL DAILY
Qty: 0 | Refills: 0 | Status: DISCONTINUED | OUTPATIENT
Start: 2019-01-10 | End: 2019-01-11

## 2019-01-10 RX ORDER — SIMETHICONE 80 MG/1
80 TABLET, CHEWABLE ORAL ONCE
Qty: 0 | Refills: 0 | Status: COMPLETED | OUTPATIENT
Start: 2019-01-10 | End: 2019-01-10

## 2019-01-10 RX ADMIN — MORPHINE SULFATE 2 MILLIGRAM(S): 50 CAPSULE, EXTENDED RELEASE ORAL at 06:03

## 2019-01-10 RX ADMIN — Medication 100 MILLIGRAM(S): at 22:00

## 2019-01-10 RX ADMIN — ONDANSETRON 4 MILLIGRAM(S): 8 TABLET, FILM COATED ORAL at 22:00

## 2019-01-10 RX ADMIN — SIMETHICONE 80 MILLIGRAM(S): 80 TABLET, CHEWABLE ORAL at 15:59

## 2019-01-10 RX ADMIN — MORPHINE SULFATE 2 MILLIGRAM(S): 50 CAPSULE, EXTENDED RELEASE ORAL at 06:33

## 2019-01-10 RX ADMIN — SODIUM CHLORIDE 1000 MILLILITER(S): 9 INJECTION INTRAMUSCULAR; INTRAVENOUS; SUBCUTANEOUS at 20:16

## 2019-01-10 RX ADMIN — FAMOTIDINE 20 MILLIGRAM(S): 10 INJECTION INTRAVENOUS at 15:57

## 2019-01-10 NOTE — DISCHARGE NOTE ADULT - PATIENT PORTAL LINK FT
You can access the [facility], offered by [facility], by registering with the following website: [email or web address]

## 2019-01-10 NOTE — CHART NOTE - NSCHARTNOTEFT_GEN_A_CORE
Resting without complaints.  Denies Chest Pain, SOB, N/V.    T(C): 36.4 (01-10-19 @ 16:00), Max: 37.1 (01-09-19 @ 22:10)  HR: 96 (01-10-19 @ 16:30) (72 - 106)  BP: 123/66 (01-10-19 @ 16:30) (109/68 - 138/71)  RR: 16 (01-10-19 @ 16:30) (16 - 18)  SpO2: 95% (01-10-19 @ 16:30) (93% - 98%)  Wt(kg): --    Exam:  Alert and [place], No Acute Distress  Card: +S1/S2, RRR  Pulm: CTAB  Laterality: Right arm: bulky dressing c/d/i, in sling  Unable to assess nv status 2/2 supraclavicular block  Fingers warm, brisk capillary refill    Xray: intra-op xrays in chart                          13.9   4.4   )-----------( 207      ( 10 Marc 2019 05:14 )             39.6    01-10    141  |  103  |  12  ----------------------------<  91  3.6   |  26  |  0.58    Ca    9.6      10 Marc 2019 05:14    TPro  7.9  /  Alb  4.4  /  TBili  0.3  /  DBili  x   /  AST  27  /  ALT  58<H>  /  AlkPhos  120  01-09      A/P: 53yFemale S/p ORIF R distal radius. VSS. NAD  -PT/OT-NWB to RUE in sling, ROM as tolerated  -IS encouraged  -DVT PPx- SCD's, OOB  -Pain Control PRN  -Continue Current Tx  -Dispo planning    Samina Wynne PA-C  Team Pager #[phone]

## 2019-01-10 NOTE — DISCHARGE NOTE ADULT - NS AS ACTIVITY OBS
Non weight bearing RUE/No Heavy lifting/straining Walking-Indoors allowed/Patient may sponge bathe/shower without direct water contacting dressings. Please avoid getting dressings wet./Do not drive or operate machinery/Bathing allowed/Walking-Outdoors allowed/No Heavy lifting/straining/Do not make important decisions

## 2019-01-10 NOTE — BRIEF OPERATIVE NOTE - PRE-OP DX
Closed fracture of distal ends of right radius and ulna, initial encounter  01/10/2019    Active  Danilo Estrada

## 2019-01-10 NOTE — PROVIDER CONTACT NOTE (OTHER) - ACTION/TREATMENT ORDERED:
MD Coffman notified and made aware. EKG, 1L NS bolus, and zofran ordered. PT discharge cancelled. Will continue to monitor.

## 2019-01-10 NOTE — PRE-OP CHECKLIST - 2.
both pt and family both verbalize understanding of free  services RN spoke to pt and family in Yoruba

## 2019-01-10 NOTE — DISCHARGE NOTE ADULT - CARE PROVIDER_API CALL
Flip Gil (MD), Orthopaedic Surgery  [address] [place] [address]  Phone: [phone]  Fax: [phone]

## 2019-01-10 NOTE — BRIEF OPERATIVE NOTE - PROCEDURE
<<-----Click on this checkbox to enter Procedure Open reduction of fracture of distal radius and ulna  01/10/2019  RIGHT  Active  SSTEIN2

## 2019-01-10 NOTE — DISCHARGE NOTE ADULT - PLAN OF CARE
- Diet as prior.  - Weight bearing status: Non weight bearing RUE  -Keep sling for comfort  -range of motion of elbow and fingers as tolerated  -follow up in 1-2 weeks with Dr. Gil, keep dressing on until that time   - If you are having uncontrollable pain, bleeding, fevers, nausea/vomiting, new onset numbness/tingling, or lethargy please call us or go to your nearest emergency department. Good follow up care - Diet as prior.  - Weight bearing status: Non weight bearing to right arm  -Keep sling for comfort  -range of motion of elbow and fingers as tolerated  -follow up in 1-2 weeks with Dr. Gil, keep dressing on until that time   - If you are having uncontrollable pain, bleeding, fevers, nausea/vomiting, new onset numbness/tingling, or lethargy please call us or go to your nearest emergency department.

## 2019-01-10 NOTE — DISCHARGE NOTE ADULT - MEDICATION SUMMARY - MEDICATIONS TO STOP TAKING
I will STOP taking the medications listed below when I get home from the hospital:    oxyCODONE 5 mg oral tablet  -- 1 tab(s) by mouth every 6 hours MDD:4 tabs  -- Caution federal law prohibits the transfer of this drug to any person other  than the person for whom it was prescribed.  It is very important that you take or use this exactly as directed.  Do not skip doses or discontinue unless directed by your doctor.  May cause drowsiness.  Alcohol may intensify this effect.  Use care when operating dangerous machinery.  This prescription cannot be refilled.  Using more of this medication than prescribed may cause serious breathing problems.    oxyCODONE 5 mg oral tablet  -- 1 tab(s) by mouth every 8 hours, As Needed pain MDD:3  -- Caution federal law prohibits the transfer of this drug to any person other  than the person for whom it was prescribed.  It is very important that you take or use this exactly as directed.  Do not skip doses or discontinue unless directed by your doctor.  May cause drowsiness.  Alcohol may intensify this effect.  Use care when operating dangerous machinery.  This prescription cannot be refilled.  Using more of this medication than prescribed may cause serious breathing problems.

## 2019-01-10 NOTE — DISCHARGE NOTE ADULT - HOSPITAL COURSE
The patient had A right distal radius fracture and required ORIF performed in the OR. Post operative the patient was sent to the PACU, the patient was hemodynamically stable and sent to a surgical floor. he patient's pain was controlled by IV pain medications transitioned to po narcotics. The patient was advanced to regular diet and tolerated it well. The patient was hemodynamically stable, and was stable for discharge on POD 0. The patient was told to follow up with Dr. Gil in 1-2 weeks for post-surgical care and to keep the dressing on and clean and dry.  Non weight bearing of RUE but should move elbow, shoulder and fingers as tolearted.  Sling for comfort until block wears off. The patient had A right distal radius fracture and required ORIF performed in the OR. Post operative the patient was sent to the PACU, the patient was hemodynamically stable and sent to a surgical floor. The patient's pain was controlled by IV pain medications transitioned to po narcotics. The patient was advanced to regular diet and tolerated it well. The patient was hemodynamically stable, and was stable for discharge on POD 0. The patient was told to follow up with Dr. Gil in 1-2 weeks for post-surgical care and to keep the dressing on and clean and dry.  Non weight bearing of RUE but should move elbow, shoulder and fingers as tolearted.  Sling for comfort until block wears off.

## 2019-01-10 NOTE — DISCHARGE NOTE ADULT - MEDICATION SUMMARY - MEDICATIONS TO TAKE
I will START or STAY ON the medications listed below when I get home from the hospital:    Percocet 5/325 oral tablet  -- 1-2 tab(s) by mouth every 4-6 hours, As Needed MDD:6   -- Caution federal law prohibits the transfer of this drug to any person other  than the person for whom it was prescribed.  May cause drowsiness.  Alcohol may intensify this effect.  Use care when operating dangerous machinery.  This prescription cannot be refilled.  This product contains acetaminophen.  Do not use  with any other product containing acetaminophen to prevent possible liver damage.  Using more of this medication than prescribed may cause serious breathing problems.    -- Indication: For pain    Vitamin C 500 mg oral tablet  -- 1 tab(s) by mouth once a day  -- Indication: For Home med

## 2019-01-10 NOTE — DISCHARGE NOTE ADULT - CARE PLAN
Principal Discharge DX:	Other closed intra-articular fracture of distal end of right radius with delayed healing, subsequent encounter  Goal:	Good follow up care  Assessment and plan of treatment:	- Diet as prior.  - Weight bearing status: Non weight bearing RUE  -Keep sling for comfort  -range of motion of elbow and fingers as tolerated  -follow up in 1-2 weeks with Dr. Gil, keep dressing on until that time   - If you are having uncontrollable pain, bleeding, fevers, nausea/vomiting, new onset numbness/tingling, or lethargy please call us or go to your nearest emergency department. Principal Discharge DX:	Other closed intra-articular fracture of distal end of right radius with delayed healing, subsequent encounter  Goal:	Good follow up care  Assessment and plan of treatment:	- Diet as prior.  - Weight bearing status: Non weight bearing to right arm  -Keep sling for comfort  -range of motion of elbow and fingers as tolerated  -follow up in 1-2 weeks with Dr. Gil, keep dressing on until that time   - If you are having uncontrollable pain, bleeding, fevers, nausea/vomiting, new onset numbness/tingling, or lethargy please call us or go to your nearest emergency department.

## 2019-01-10 NOTE — BRIEF OPERATIVE NOTE - POST-OP DX
Closed fracture of distal end of right radius, unspecified fracture morphology, initial encounter  01/10/2019    Active  Danilo Estrada

## 2019-01-11 ENCOUNTER — INBOUND DOCUMENT (OUTPATIENT)
Age: 54
End: 2019-01-11

## 2019-01-11 VITALS
HEART RATE: 86 BPM | DIASTOLIC BLOOD PRESSURE: 81 MMHG | RESPIRATION RATE: 18 BRPM | TEMPERATURE: 98 F | OXYGEN SATURATION: 96 % | SYSTOLIC BLOOD PRESSURE: 120 MMHG

## 2019-01-11 PROCEDURE — C1713: CPT

## 2019-01-11 PROCEDURE — 80048 BASIC METABOLIC PNL TOTAL CA: CPT

## 2019-01-11 PROCEDURE — 86850 RBC ANTIBODY SCREEN: CPT

## 2019-01-11 PROCEDURE — 84702 CHORIONIC GONADOTROPIN TEST: CPT

## 2019-01-11 PROCEDURE — 93005 ELECTROCARDIOGRAM TRACING: CPT

## 2019-01-11 PROCEDURE — 81001 URINALYSIS AUTO W/SCOPE: CPT

## 2019-01-11 PROCEDURE — 85027 COMPLETE CBC AUTOMATED: CPT

## 2019-01-11 PROCEDURE — 99285 EMERGENCY DEPT VISIT HI MDM: CPT

## 2019-01-11 PROCEDURE — C1889: CPT

## 2019-01-11 PROCEDURE — 85730 THROMBOPLASTIN TIME PARTIAL: CPT

## 2019-01-11 PROCEDURE — 85610 PROTHROMBIN TIME: CPT

## 2019-01-11 PROCEDURE — 86900 BLOOD TYPING SEROLOGIC ABO: CPT

## 2019-01-11 PROCEDURE — 86901 BLOOD TYPING SEROLOGIC RH(D): CPT

## 2019-01-11 PROCEDURE — 80053 COMPREHEN METABOLIC PANEL: CPT

## 2019-01-11 PROCEDURE — 71045 X-RAY EXAM CHEST 1 VIEW: CPT

## 2019-01-11 PROCEDURE — 76000 FLUOROSCOPY <1 HR PHYS/QHP: CPT

## 2019-01-11 RX ADMIN — Medication 100 MILLIGRAM(S): at 06:12

## 2019-01-11 RX ADMIN — OXYCODONE HYDROCHLORIDE 10 MILLIGRAM(S): 5 TABLET ORAL at 10:51

## 2019-01-11 RX ADMIN — OXYCODONE HYDROCHLORIDE 10 MILLIGRAM(S): 5 TABLET ORAL at 11:25

## 2019-01-11 NOTE — PROGRESS NOTE ADULT - REASON FOR ADMISSION
right wrist pain
medical evaluation prior to surgery
right wrist pain
right wrist pain

## 2019-01-11 NOTE — PROGRESS NOTE ADULT - PROBLEM SELECTOR PLAN 3
Check T4 and TSH
Check T4 and TSH  Patient not currently on thyroid medication
Check T4 and TSH  Patient not currently on thyroid medication

## 2019-01-11 NOTE — PROGRESS NOTE ADULT - ASSESSMENT
53y Female presents with R DR treadwell after [facility] fall s/p CR and splinting ~3 weeks ago. She was told to follow up for likely surgical intervention but says she was not able to get appointment. Her pain is gradually improving. Denies numbness, tingling paresthesias in affected extremity. No other orthopedic injuries at this time. No interval changes. She's been compliant with splint and NWB status. Patient to undergo ORIF in am  .
53y Female presents with R DR treadwell after [facility] fall s/p CR and splinting ~3 weeks ago. She was told to follow up for likely surgical intervention but says she was not able to get appointment. Her pain is gradually improving. Denies numbness, tingling paresthesias in affected extremity. No other orthopedic injuries at this time. No interval changes. She's been compliant with splint and NWB status.  Patient s/p ORIF of right wrist. Patint  is medically stable .  D/C as per ortho.
53y Female presents with R DR treadwell after [facility] fall s/p CR and splinting ~3 weeks ago. She was told to follow up for likely surgical intervention but says she was not able to get appointment. Her pain is gradually improving. Denies numbness, tingling paresthesias in affected extremity. No other orthopedic injuries at this time. No interval changes. She's been compliant with splint and NWB status.  Patient s/p ORIF of right wrist.

## 2019-01-11 NOTE — PROGRESS NOTE ADULT - PROBLEM SELECTOR PLAN 5
Check irons studies
Check irons studies  Follow CBC and transfuse as needed
Check irons studies  Follow CBC and transfuse as needed

## 2019-01-11 NOTE — PROGRESS NOTE ADULT - PROBLEM SELECTOR PLAN 1
No medical contraindication ot surgery right distal radius freature
tolerated procedure well  pain meds as needed  DVT and GI prophylaxis
tolerated procedure well  pain meds as needed  DVT and GI prophylaxis

## 2019-01-11 NOTE — PROGRESS NOTE ADULT - SUBJECTIVE AND OBJECTIVE BOX
Patient is a 53y old  Female who presents with a chief complaint of right wrist pain (2019 22:05)      HPI:  53y Female presents with LAWRENCE treadwell after [facility] fall s/p CR and splinting ~3 weeks ago. She was told to follow up for likely surgical intervention but says she was not able to get appointment. Her pain is gradually improving. Denies numbness, tingling paresthesias in affected extremity. No other orthopedic injuries at this time. No interval changes. She's been compliant with splint and NWB status. Patient to undergo ORIF in am  .    MEDICATIONS  (STANDING):  influenza   Vaccine 0.5 milliLiter(s) IntraMuscular once  lactated ringers. 1000 milliLiter(s) (75 mL/Hr) IV Continuous <Continuous>    MEDICATIONS  (PRN):  acetaminophen   Tablet .. 650 milliGRAM(s) Oral every 6 hours PRN Temp greater or equal to 38C (100.4F), Mild Pain (1 - 3)  morphine  - Injectable 2 milliGRAM(s) IV Push every 4 hours PRN Breakthrough  oxyCODONE    IR 10 milliGRAM(s) Oral every 4 hours PRN Severe Pain (7 - 10)  oxyCODONE    IR 5 milliGRAM(s) Oral every 4 hours PRN Moderate Pain (4 - 6)      Allergies    Allergy Status Unknown    Intolerances    lactose (Diarrhea)    PAST MEDICAL HISTORY:  Hypercholesterolemia  Thyroid nodule  HTN (hypertension)  Benign breast lumps, left  Iron deficiency anemia  Uterine leiomyoma    PAST SURGICAL HISTORY:  H/O abdominal hysterectomy  S/P breast biopsy, left  S/P tubal ligation      Diet:  (   ) Regular   ( x  ) Low Sodium   ( x  ) Low Cholesterol   (   ) Diabetic   (   ) Other    FAMILY HISTORY:  Diabetes      SOCIAL HISTORY:  Marital Status:  ( x  )    (   ) Single    (   )    (   )   Occupation:   Lives with: (   ) alone  (   ) children   ( x  ) spouse   (   ) parents  (   ) other  Substance Use: (x  ) never used  (  ) other:  Tobacco Usage:  ( x  ) never smoked   (   ) former smoker   (   ) current smoker  (   ) pack years  (   ) last cigarette date  Alcohol Usage:  non drinker  Advanced Directives: (   ) None    (   ) DNR    (   ) DNI    (   ) Health Care Proxy:     REVIEW OF SYSTEM:  CONSTITUTIONAL: No fever, No change in weight, No fatigue  HEAD: No headache, No dizziness, No recent trauma  EYES: No eye pain, No visual disturbances, No discharge  ENT:  No difficulty hearing, No tinnitus, No vertigo, No sinus pain, No throat pain  NECK: No pain, No stiffness  RESPIRATORY: No cough, No wheezing, No chills, No hemoptysis, No shortness of breath at rest or exertional shortness of breath  CARDIOVASCULAR: No chest pain, No palpitations, No dizziness, No CHF, No arrhythmia, No cardiomegaly, No leg swelling  GASTROINTESTINAL: No abdominal, No epigastric pain. No nausea, No vomiting, No hematemesis, No diarrhea, No constipation. No melena, No hematochezia. No GERD  GENITOURINARY: No dysuria, No frequency, No hematuria, No incontinence, No nocturia, No hesitancy,  SKIN: No itching, No burning, No rashes, No lesions   LYMPH NODES: No history of enlarged glands  ENDOCRINE: No heat or cold intolerance, No hair loss. No osteoporosis, No thyroid disease  MUSCULOSKELETAL: No joint pain or swelling, No muscle, back, or extremity pain  (+) right wrist pain   PSYCHIATRIC: No depression, No anxiety, No mood swings, No difficulty sleeping  HEME/LYMPH: No easy bruising, No anticoagulants, No bleeding disorder, No bleeding gums  ALLERGY AND IMMUNOLOGIC: No hives, No eczema  NEUROLOGICAL: No memory loss, No loss of strength, No numbness, No tremors    VITALS:  Vital Signs Last 24 Hrs  T(C): 37.1 (2019 22:10), Max: 37.1 (2019 22:10)  T(F): 98.8 (2019 22:10), Max: 98.8 (2019 22:10)  HR: 79 (2019 22:10) (79 - 100)  BP: 115/74 (2019 22:10) (111/78 - 137/82)  BP(mean): --  RR: 16 (2019 22:10) (16 - 18)  SpO2: 96% (2019 22:10) (96% - 99%)  I&O's Summary    2019 07:01  -  2019 23:51  --------------------------------------------------------  IN: 240 mL / OUT: 250 mL / NET: -10 mL        PHYSICAL EXAM:  GENERAL: NAD, well nourished and conversant  HEAD:  Atraumatic  EYES: EOM, PERRLA, conjunctiva pink and sclera white  ENT: No tonsillar erythema, exudates, or enlargement, moist mucous membranes, good dentition, no lesions  NECK: Supple, No JVD, normal thyroid, carotids with normal upstrokes and no bruits  CHEST/LUNG: Clear to auscultation bilaterally, No rales, rhonchi, wheezing, or rubs  HEART: Regular rate and rhythm, No murmurs, rubs, or gallops  ABDOMEN: Soft, nondistended, no masses, guarding, tenderness or rebound, bowel sounds present  EXTREMITIES:  2+ Peripheral Pulses, No clubbing, cyanosis, or edema. splinted right wrist         SKIN: No rashes or lesions  NERVOUS SYSTEM:  Alert & Oriented X3, normal cognitive function. Motor Strength 5/5 right upper and right lower.  5/5 left upper and left lower extremities, DTRs 2+ intact and symmetric    LABS:    EKG nsr no acute changes    CBC Full  -  ( 2019 12:26 )  WBC Count : 5.1 K/uL  Hemoglobin : 14.6 g/dL  Hematocrit : 41.1 %  Platelet Count - Automated : 226 K/uL  Mean Cell Volume : 85.9 fl  Mean Cell Hemoglobin : 30.5 pg  Mean Cell Hemoglobin Concentration : 35.6 gm/dL  Auto Neutrophil # : 3.2 K/uL  Auto Lymphocyte # : 1.4 K/uL  Auto Monocyte # : 0.3 K/uL  Auto Eosinophil # : 0.1 K/uL  Auto Basophil # : 0.0 K/uL  Auto Neutrophil % : 63.4 %  Auto Lymphocyte % : 28.4 %  Auto Monocyte % : 6.7 %  Auto Eosinophil % : 1.3 %  Auto Basophil % : 0.3 %        139  |  102  |  11  ----------------------------<  95  3.6   |  26  |  0.54    Ca    9.8      2019 12:26    TPro  7.9  /  Alb  4.4  /  TBili  0.3  /  DBili  x   /  AST  27  /  ALT  58<H>  /  AlkPhos  120      LIVER FUNCTIONS - ( 2019 12:26 )  Alb: 4.4 g/dL / Pro: 7.9 g/dL / ALK PHOS: 120 U/L / ALT: 58 U/L / AST: 27 U/L / GGT: x           PT/INR - ( 2019 12:26 )   PT: 11.7 sec;   INR: 1.03 ratio         PTT - ( 2019 12:26 )  PTT:28.1 sec  Urinalysis Basic - ( 2019 15:04 )    Color: Light Yellow / Appearance: Clear / S.009 / pH: x  Gluc: x / Ketone: Negative  / Bili: Negative / Urobili: Negative   Blood: x / Protein: Negative / Nitrite: Negative   Leuk Esterase: Negative / RBC: 3 /hpf / WBC 1 /HPF   Sq Epi: x / Non Sq Epi: 1 /hpf / Bacteria: Negative      CAPILLARY BLOOD GLUCOSE          RADIOLOGY & ADDITIONAL TESTS:    Consultant(s):    Care Discussed with Consultants/Other Providers [ ] YES  [ ] NO
53y Female presents with LAWRENCE treadwell after [facility] fall s/p CR and splinting ~3 weeks ago. She was told to follow up for likely surgical intervention but says she was not able to get appointment. Her pain is gradually improving. Denies numbness, tingling paresthesias in affected extremity. No other orthopedic injuries at this time. No interval changes. She's been compliant with splint and NWB status. Patient seen in PACU s/p ORIF of right wrist    MEDICATIONS  (STANDING):  ceFAZolin   IVPB 2000 milliGRAM(s) IV Intermittent every 8 hours  docusate sodium 100 milliGRAM(s) Oral three times a day  influenza   Vaccine 0.5 milliLiter(s) IntraMuscular once    MEDICATIONS  (PRN):  acetaminophen   Tablet .. 650 milliGRAM(s) Oral every 6 hours PRN Temp greater or equal to 38C (100.4F), Mild Pain (1 - 3)  magnesium hydroxide Suspension 30 milliLiter(s) Oral daily PRN Constipation  morphine  - Injectable 2 milliGRAM(s) IV Push every 4 hours PRN severe breakthrough pain  ondansetron Injectable 4 milliGRAM(s) IV Push every 6 hours PRN Nausea and/or Vomiting  oxyCODONE    IR 10 milliGRAM(s) Oral every 4 hours PRN Severe Pain (7 - 10)  oxyCODONE    IR 5 milliGRAM(s) Oral every 4 hours PRN Moderate Pain (4 - 6)      Vital Signs Last 24 Hrs  T(C): 37.1 (11 Jan 2019 05:17), Max: 37.4 (10 Macr 2019 19:30)  T(F): 98.7 (11 Jan 2019 05:17), Max: 99.3 (10 Marc 2019 19:30)  HR: 88 (11 Jan 2019 05:17) (72 - 115)  BP: 110/67 (11 Jan 2019 05:17) (107/68 - 138/71)  BP(mean): 89 (10 Marc 2019 16:30) (89 - 95)  RR: 18 (11 Jan 2019 05:17) (16 - 18)  SpO2: 96% (11 Jan 2019 05:17) (93% - 98%)        PHYSICAL EXAM:  GENERAL: NAD, well nourished and conversant  HEAD:  Atraumatic  EYES: EOM, PERRLA, conjunctiva pink and sclera white  ENT: No tonsillar erythema, exudates, or enlargement, moist mucous membranes, good dentition, no lesions  NECK: Supple, No JVD, normal thyroid, carotids with normal upstrokes and no bruits  CHEST/LUNG: Clear to auscultation bilaterally, No rales, rhonchi, wheezing, or rubs  HEART: Regular rate and rhythm, No murmurs, rubs, or gallops  ABDOMEN: Soft, nondistended, no masses, guarding, tenderness or rebound, bowel sounds present  EXTREMITIES:  2+ Peripheral Pulses, No clubbing, cyanosis, or edema. splinted right wrist         SKIN: No rashes or lesions  NERVOUS SYSTEM:  Alert & Oriented X3, normal cognitive function. Motor Strength 5/5 right upper and right lower.  5/5 left upper and left lower extremities, DTRs 2+ intact and symmetric    LABS:          CBC Full  -  ( 10 Marc 2019 05:14 )  WBC Count : 4.4 K/uL  Hemoglobin : 13.9 g/dL  Hematocrit : 39.6 %  Platelet Count - Automated : 207 K/uL  Mean Cell Volume : 86.3 fl  Mean Cell Hemoglobin : 30.4 pg  Mean Cell Hemoglobin Concentration : 35.2 gm/dL  Auto Neutrophil # : x  Auto Lymphocyte # : x  Auto Monocyte # : x  Auto Eosinophil # : x  Auto Basophil # : x  Auto Neutrophil % : x  Auto Lymphocyte % : x  Auto Monocyte % : x  Auto Eosinophil % : x  Auto Basophil % : x    01-10    141  |  103  |  12  ----------------------------<  91  3.6   |  26  |  0.58    Ca    9.6      10 Marc 2019 05:14        PT/INR - ( 10 Marc 2019 05:14 )   PT: 11.9 sec;   INR: 1.04 ratio         PTT - ( 10 Marc 2019 05:14 )  PTT:29.4 sec
53y Female presents with LAWRENCE treadwell after [facility] fall s/p CR and splinting ~3 weeks ago. She was told to follow up for likely surgical intervention but says she was not able to get appointment. Her pain is gradually improving. Denies numbness, tingling paresthesias in affected extremity. No other orthopedic injuries at this time. No interval changes. She's been compliant with splint and NWB status. Patient seen in PACU s/p ORIF of right wrist    MEDICATIONS  (STANDING):  ceFAZolin   IVPB 2000 milliGRAM(s) IV Intermittent every 8 hours  docusate sodium 100 milliGRAM(s) Oral three times a day  influenza   Vaccine 0.5 milliLiter(s) IntraMuscular once    MEDICATIONS  (PRN):  acetaminophen   Tablet .. 650 milliGRAM(s) Oral every 6 hours PRN Temp greater or equal to 38C (100.4F), Mild Pain (1 - 3)  magnesium hydroxide Suspension 30 milliLiter(s) Oral daily PRN Constipation  morphine  - Injectable 2 milliGRAM(s) IV Push every 4 hours PRN severe breakthrough pain  ondansetron Injectable 4 milliGRAM(s) IV Push every 6 hours PRN Nausea and/or Vomiting  oxyCODONE    IR 10 milliGRAM(s) Oral every 4 hours PRN Severe Pain (7 - 10)  oxyCODONE    IR 5 milliGRAM(s) Oral every 4 hours PRN Moderate Pain (4 - 6)          VITALS:   T(C): 36.4 (01-10-19 @ 16:00), Max: 37.1 (19 @ 22:10)  HR: 96 (01-10-19 @ 16:30) (72 - 106)  BP: 123/66 (01-10-19 @ 16:30) (109/68 - 138/71)  RR: 16 (01-10-19 @ 16:30) (16 - 18)  SpO2: 95% (01-10-19 @ 16:30) (93% - 98%)  Wt(kg): --    PHYSICAL EXAM:  GENERAL: NAD, well nourished and conversant  HEAD:  Atraumatic  EYES: EOM, PERRLA, conjunctiva pink and sclera white  ENT: No tonsillar erythema, exudates, or enlargement, moist mucous membranes, good dentition, no lesions  NECK: Supple, No JVD, normal thyroid, carotids with normal upstrokes and no bruits  CHEST/LUNG: Clear to auscultation bilaterally, No rales, rhonchi, wheezing, or rubs  HEART: Regular rate and rhythm, No murmurs, rubs, or gallops  ABDOMEN: Soft, nondistended, no masses, guarding, tenderness or rebound, bowel sounds present  EXTREMITIES:  2+ Peripheral Pulses, No clubbing, cyanosis, or edema. splinted right wrist         SKIN: No rashes or lesions  NERVOUS SYSTEM:  Alert & Oriented X3, normal cognitive function. Motor Strength 5/5 right upper and right lower.  5/5 left upper and left lower extremities, DTRs 2+ intact and symmetric    LABS:        CBC Full  -  ( 10 Marc 2019 05:14 )  WBC Count : 4.4 K/uL  Hemoglobin : 13.9 g/dL  Hematocrit : 39.6 %  Platelet Count - Automated : 207 K/uL  Mean Cell Volume : 86.3 fl  Mean Cell Hemoglobin : 30.4 pg  Mean Cell Hemoglobin Concentration : 35.2 gm/dL  Auto Neutrophil # : x  Auto Lymphocyte # : x  Auto Monocyte # : x  Auto Eosinophil # : x  Auto Basophil # : x  Auto Neutrophil % : x  Auto Lymphocyte % : x  Auto Monocyte % : x  Auto Eosinophil % : x  Auto Basophil % : x    -10    141  |  103  |  12  ----------------------------<  91  3.6   |  26  |  0.58    Ca    9.6      10 Marc 2019 05:14    TPro  7.9  /  Alb  4.4  /  TBili  0.3  /  DBili  x   /  AST  27  /  ALT  58<H>  /  AlkPhos  120  -09    LIVER FUNCTIONS - ( 2019 12:26 )  Alb: 4.4 g/dL / Pro: 7.9 g/dL / ALK PHOS: 120 U/L / ALT: 58 U/L / AST: 27 U/L / GGT: x           PT/INR - ( 10 Marc 2019 05:14 )   PT: 11.9 sec;   INR: 1.04 ratio         PTT - ( 10 Marc 2019 05:14 )  PTT:29.4 sec  Urinalysis Basic - ( 2019 15:04 )    Color: Light Yellow / Appearance: Clear / S.009 / pH: x  Gluc: x / Ketone: Negative  / Bili: Negative / Urobili: Negative   Blood: x / Protein: Negative / Nitrite: Negative   Leuk Esterase: Negative / RBC: 3 /hpf / WBC 1 /HPF   Sq Epi: x / Non Sq Epi: 1 /hpf / Bacteria: Negative      CAPILLARY BLOOD GLUCOSE          RADIOLOGY & ADDITIONAL TESTS:
No acute events overnight. Pain controlled.     PE:  Vital Signs Last 24 Hrs  T(C): 37.1 (11 Jan 2019 05:17), Max: 37.4 (10 Marc 2019 19:30)  T(F): 98.7 (11 Jan 2019 05:17), Max: 99.3 (10 Marc 2019 19:30)  HR: 88 (11 Jan 2019 05:17) (72 - 115)  BP: 110/67 (11 Jan 2019 05:17) (107/68 - 138/71)  BP(mean): 89 (10 Marc 2019 16:30) (89 - 95)  RR: 18 (11 Jan 2019 05:17) (16 - 18)  SpO2: 96% (11 Jan 2019 05:17) (93% - 98%)  Gen: No acute distress  RUE: dressing c/d/i  mod edema  skin intact  SILT  motor: block remains in effect   [facility]     Labs:                        13.9   4.4   )-----------( 207      ( 10 Marc 2019 05:14 )             39.6   01-10    141  |  103  |  12  ----------------------------<  91  3.6   |  26  |  0.58    Ca    9.6      10 Marc 2019 05:14    TPro  7.9  /  Alb  4.4  /  TBili  0.3  /  DBili  x   /  AST  27  /  ALT  58<H>  /  AlkPhos  120  01-09        Assessment/Plan:  53yFemale s/p R distal radius ORIF POD 1  -pain control  -PT/OT  -NWB RUE   -OOB  -DVT ppx  -dispo plan
ORTHO ATTENDING POST OP    s/p ORIF R distal radius  Bulky dressing  elevation  keep dressing clean and dry until office visit  sling  NWB RUE  ROM as tolerated RUE  percocet to vivo  f/u next week  encourage ROM fingers
Orthopaedic Surgery Preop Note    Dx: LAWRENCE treadwell  Procedure: KIANNA   Surgeon: Louise        139  |  102  |  11  ----------------------------<  95  3.6   |  26  |  0.54    Ca    9.8      2019 12:26    TPro  7.9  /  Alb  4.4  /  TBili  0.3  /  DBili  x   /  AST  27  /  ALT  58<H>  /  AlkPhos  120                            14.6   5.1   )-----------( 226      ( 2019 12:26 )             41.1     PT/INR - ( 2019 12:26 )   PT: 11.7 sec;   INR: 1.03 ratio         PTT - ( 2019 12:26 )  PTT:28.1 sec  Urinalysis Basic - ( 2019 15:04 )    Color: Light Yellow / Appearance: Clear / S.009 / pH: x  Gluc: x / Ketone: Negative  / Bili: Negative / Urobili: Negative   Blood: x / Protein: Negative / Nitrite: Negative   Leuk Esterase: Negative / RBC: 3 /hpf / WBC 1 /HPF   Sq Epi: x / Non Sq Epi: 1 /hpf / Bacteria: Negative        - EKG in chart  - T/S done  - UA negative  - HcG negative  - CXR done    53y Female to undergo R DR HUTCHISON  - NPO pMN  - IVF when NPO  - Hold anticoagulation pMN  - Consent in chart  - MEdical clearance  - Plan for OR tomorrow
Orthopaedic Surgery Progress Note    Subjective:   Patient seen and examined  No acute events overnight    Objective:  T(C): 36.9 (01-10-19 @ 05:11), Max: 37.1 (19 @ 22:10)  HR: 75 (01-10-19 @ 05:11) (75 - 100)  BP: 136/82 (01-10-19 @ 05:11) (111/78 - 137/82)  RR: 18 (01-10-19 @ 05:11) (16 - 18)  SpO2: 97% (01-10-19 @ 05:11) (96% - 99%)  Wt(kg): --     @ 07:01  -  01-10 @ 06:54  --------------------------------------------------------  IN: 915 mL / OUT: 850 mL / NET: 65 mL        PE    NAD  RUE:  splint intact  motor/sensory intact  compartments soft  WWP                          13.9   4.4   )-----------( 207      ( 10 Marc 2019 05:14 )             39.6     01-10    141  |  103  |  12  ----------------------------<  91  3.6   |  26  |  0.58    Ca    9.6      10 Marc 2019 05:14    TPro  7.9  /  Alb  4.4  /  TBili  0.3  /  DBili  x   /  AST  27  /  ALT  58<H>  /  AlkPhos  120      PT/INR - ( 10 Marc 2019 05:14 )   PT: 11.9 sec;   INR: 1.04 ratio         PTT - ( 10 Marc 2019 05:14 )  PTT:29.4 sec  Urinalysis Basic - ( 2019 15:04 )    Color: Light Yellow / Appearance: Clear / S.009 / pH: x  Gluc: x / Ketone: Negative  / Bili: Negative / Urobili: Negative   Blood: x / Protein: Negative / Nitrite: Negative   Leuk Esterase: Negative / RBC: 3 /hpf / WBC 1 /HPF   Sq Epi: x / Non Sq Epi: 1 /hpf / Bacteria: Negative        53y Female w/ R Dr fx  - Pain control  - NPO/IVF  - Hold anticoagulation  - FFU AM labs  - Med clearance documented  - Plan for OR for ORIF

## 2019-01-14 DIAGNOSIS — S52.531D COLLES' FRACTURE OF RIGHT RADIUS, SUBSEQUENT ENCOUNTER FOR CLOSED FRACTURE WITH ROUTINE HEALING: ICD-10-CM

## 2019-01-14 NOTE — DISCUSSION/SUMMARY
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 She was admitted to [facility] from 1/9-1/11 and underwent an ORIF with orthopedic surgery. Called patient, reports feeling OK, taking pain meds as needed, not requesting more. Notes she will make an appointment with ortho either this week or next for follow up. All questions answered to her satisfaction.

## 2019-01-15 ENCOUNTER — APPOINTMENT (OUTPATIENT)
Dept: INTERNAL MEDICINE | Facility: CLINIC | Age: 54
End: 2019-01-15

## 2019-01-16 ENCOUNTER — APPOINTMENT (OUTPATIENT)
Dept: ORTHOPEDIC SURGERY | Facility: CLINIC | Age: 54
End: 2019-01-16
Payer: MEDICAID

## 2019-01-16 VITALS — BODY MASS INDEX: 31.58 KG/M2 | WEIGHT: 184 LBS

## 2019-01-16 PROCEDURE — 99024 POSTOP FOLLOW-UP VISIT: CPT

## 2019-01-18 NOTE — HISTORY OF PRESENT ILLNESS
[Doing Well] : is doing well [Excellent Pain Control] : has excellent pain control [No Sign of Infection] : is showing no signs of infection [de-identified] : S/P ORIF right distal radius fracture 1/10/19  [de-identified] : 54 yo F ritika speaking presents S/P ORIF right distal radius fracture 1/10/19. doing well post operatively but complaining of very stiff swollen fingers.  [de-identified] : Physical Exam of the  RT wrist: The splint was removed . Steri strips are in place. There is no erythema or drainage there are no signs of skin breakdown or infection. There is resolving ecchymosis. There is normal postoperative edema. ROM Patient  cannot yet actively make a fist . The ROM of the RT wrist is 5 degrees wrist extension and 10 degrees wrist flexion There is a normal neurovascular exam and normal capillary refill.   [de-identified] : S/P ORIF right distal radius fracture 1/10/19 \par - hand therapy \par - cock up wrist splint for comfort \par - F/U in 2 weeks with XR

## 2019-01-30 ENCOUNTER — OUTPATIENT (OUTPATIENT)
Dept: OUTPATIENT SERVICES | Facility: HOSPITAL | Age: 54
LOS: 1 days | End: 2019-01-30
Payer: SELF-PAY

## 2019-01-30 ENCOUNTER — APPOINTMENT (OUTPATIENT)
Dept: INTERNAL MEDICINE | Facility: CLINIC | Age: 54
End: 2019-01-30
Payer: COMMERCIAL

## 2019-01-30 ENCOUNTER — APPOINTMENT (OUTPATIENT)
Dept: ORTHOPEDIC SURGERY | Facility: CLINIC | Age: 54
End: 2019-01-30
Payer: MEDICAID

## 2019-01-30 VITALS — WEIGHT: 180 LBS | HEIGHT: 64 IN | BODY MASS INDEX: 30.73 KG/M2

## 2019-01-30 VITALS
DIASTOLIC BLOOD PRESSURE: 70 MMHG | BODY MASS INDEX: 30.73 KG/M2 | SYSTOLIC BLOOD PRESSURE: 110 MMHG | HEIGHT: 64 IN | WEIGHT: 180 LBS

## 2019-01-30 DIAGNOSIS — E78.5 HYPERLIPIDEMIA, UNSPECIFIED: ICD-10-CM

## 2019-01-30 DIAGNOSIS — Z98.89 OTHER SPECIFIED POSTPROCEDURAL STATES: Chronic | ICD-10-CM

## 2019-01-30 DIAGNOSIS — M25.511 PAIN IN RIGHT SHOULDER: ICD-10-CM

## 2019-01-30 DIAGNOSIS — Z90.710 ACQUIRED ABSENCE OF BOTH CERVIX AND UTERUS: Chronic | ICD-10-CM

## 2019-01-30 DIAGNOSIS — I10 ESSENTIAL (PRIMARY) HYPERTENSION: ICD-10-CM

## 2019-01-30 DIAGNOSIS — Z98.51 TUBAL LIGATION STATUS: Chronic | ICD-10-CM

## 2019-01-30 LAB
BASOPHILS # BLD AUTO: 0.05 K/UL
BASOPHILS NFR BLD AUTO: 1.1 %
EOSINOPHIL # BLD AUTO: 0.09 K/UL
EOSINOPHIL NFR BLD AUTO: 1.9 %
HBA1C MFR BLD HPLC: 5.6 %
HCT VFR BLD CALC: 42.9 %
HGB BLD-MCNC: 14 G/DL
IMM GRANULOCYTES NFR BLD AUTO: 0 %
LYMPHOCYTES # BLD AUTO: 1.62 K/UL
LYMPHOCYTES NFR BLD AUTO: 34.5 %
MAN DIFF?: NORMAL
MCHC RBC-ENTMCNC: 29.4 PG
MCHC RBC-ENTMCNC: 32.6 GM/DL
MCV RBC AUTO: 90.1 FL
MONOCYTES # BLD AUTO: 0.26 K/UL
MONOCYTES NFR BLD AUTO: 5.5 %
NEUTROPHILS # BLD AUTO: 2.68 K/UL
NEUTROPHILS NFR BLD AUTO: 57 %
PLATELET # BLD AUTO: 257 K/UL
RBC # BLD: 4.76 M/UL
RBC # FLD: 12.4 %
WBC # FLD AUTO: 4.7 K/UL

## 2019-01-30 PROCEDURE — 99024 POSTOP FOLLOW-UP VISIT: CPT

## 2019-01-30 PROCEDURE — G0463: CPT

## 2019-01-30 PROCEDURE — 73110 X-RAY EXAM OF WRIST: CPT | Mod: RT

## 2019-01-30 PROCEDURE — 99213 OFFICE O/P EST LOW 20 MIN: CPT | Mod: GE

## 2019-01-30 NOTE — PHYSICAL EXAM
[No Acute Distress] : no acute distress [Well Nourished] : well nourished [Well Developed] : well developed [Normal Sclera/Conjunctiva] : normal sclera/conjunctiva [PERRL] : pupils equal round and reactive to light [EOMI] : extraocular movements intact [Normal Outer Ear/Nose] : the outer ears and nose were normal in appearance [Normal Oropharynx] : the oropharynx was normal [No JVD] : no jugular venous distention [Supple] : supple [No Respiratory Distress] : no respiratory distress  [Clear to Auscultation] : lungs were clear to auscultation bilaterally [Normal Rate] : normal rate  [Regular Rhythm] : with a regular rhythm [Normal S1, S2] : normal S1 and S2 [No Abdominal Bruit] : a ~M bruit was not heard ~T in the abdomen [No Edema] : there was no peripheral edema [Soft] : abdomen soft [Non Tender] : non-tender [No HSM] : no HSM [Normal Supraclavicular Nodes] : no supraclavicular lymphadenopathy [Normal Anterior Cervical Nodes] : no anterior cervical lymphadenopathy [No CVA Tenderness] : no CVA  tenderness [No Spinal Tenderness] : no spinal tenderness [No Joint Swelling] : no joint swelling [Grossly Normal Strength/Tone] : grossly normal strength/tone [No Rash] : no rash [No Skin Lesions] : no skin lesions [Normal Gait] : normal gait [Coordination Grossly Intact] : coordination grossly intact [No Focal Deficits] : no focal deficits [Speech Grossly Normal] : speech grossly normal [Normal Affect] : the affect was normal [Alert and Oriented x3] : oriented to person, place, and time [Normal Mood] : the mood was normal

## 2019-01-30 NOTE — HISTORY OF PRESENT ILLNESS
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 She states that she initially had R shoulder pain after the fall that caused her wrist fracture. It went away for a while but has returned over the past 8 days. She feels it was initially worsened by her cast which she no longer wears, and now is worse when she is wearing her sling or when she tries to change her bra. She took a percocet this morning so states it is less painful now. She recently stopped taking ibuprofen which she was taking 1-2x per day. \par Patient's last A1c was 5.4% (12/2017), mammogram (1/2018) demonstrated no evidence of malignancy, Last Pap smear in 2013, negative. Last colonoscopy (3/2018) demonstrated diverticulosis, 8 mm tubular adenoma polyp s/p resection, and local mild inflammation at ascending colon and cecum that was biopsied (bx revealed normal tissue), recommended return for screening in 2023. She reports having flu shot from outside source on 10/22/2018. Reports that she had a Zoster vaccine while in [place] on 7/16/2018, only had one shot, unclear if it was Shingrix or Zostavax.

## 2019-01-30 NOTE — ASSESSMENT
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 Difficult to assess full exam given limitations on same arm with recent fx/surgery. Non-tender, exam less c/w rotator cuff/impingement/bursitis, possible frozen shoulder in the setting of lack of mobility. Patient has not been taking anti-inflammatory medications regularly (takes 1-2 ibuprofen/day for pain).\par - X-ray R shoulder to r/o traumatic injury\par - Meloxicam prescribed for ease of anti-inflammatory pain relief with 1x/day use\par - Will consider PT at later time, may not be ready yet 2/2 wrist\par - F/u w/her orthopedic.\par \par # HCM\par - Up to date with flu shot\par - Patient to obtain Zoster vaccine records, as she only had 1 dose and we are unsure which vaccine\par - Tdap at next visit or within next year\par - A1c, CBC, CMP, Lipid panel\par - Mammogram referral\par - Referral given for Pap smear

## 2019-01-30 NOTE — REVIEW OF SYSTEMS
[Joint Pain] : joint pain [Joint Stiffness] : joint stiffness [Fever] : no fever [Chills] : no chills [Discharge] : no discharge [Pain] : no pain [Vision Problems] : no vision problems [Hearing Loss] : no hearing loss [Nasal Discharge] : no nasal discharge [Chest Pain] : no chest pain [Palpitations] : no palpitations [Shortness Of Breath] : no shortness of breath [Wheezing] : no wheezing [Cough] : no cough [Abdominal Pain] : no abdominal pain [Nausea] : no nausea [Constipation] : no constipation [Dysuria] : no dysuria [Incontinence] : no incontinence [Hematuria] : no hematuria [Itching] : no itching [Skin Rash] : no skin rash [Headache] : no headache [Dizziness] : no dizziness [Insomnia] : no insomnia [Depression] : no depression [Easy Bruising] : no easy bruising

## 2019-01-31 LAB
ANION GAP SERPL CALC-SCNC: 13 MMOL/L
BUN SERPL-MCNC: 16 MG/DL
CALCIUM SERPL-MCNC: 10 MG/DL
CHLORIDE SERPL-SCNC: 101 MMOL/L
CHOLEST SERPL-MCNC: 238 MG/DL
CHOLEST/HDLC SERPL: 4.9 RATIO
CO2 SERPL-SCNC: 26 MMOL/L
CREAT SERPL-MCNC: 0.54 MG/DL
GLUCOSE SERPL-MCNC: 83 MG/DL
HDLC SERPL-MCNC: 49 MG/DL
LDLC SERPL CALC-MCNC: 162 MG/DL
POTASSIUM SERPL-SCNC: 3.7 MMOL/L
SODIUM SERPL-SCNC: 140 MMOL/L
TRIGL SERPL-MCNC: 134 MG/DL

## 2019-03-06 ENCOUNTER — APPOINTMENT (OUTPATIENT)
Dept: ORTHOPEDIC SURGERY | Facility: CLINIC | Age: 54
End: 2019-03-06
Payer: MEDICAID

## 2019-03-06 PROCEDURE — 99024 POSTOP FOLLOW-UP VISIT: CPT

## 2019-03-06 PROCEDURE — 73110 X-RAY EXAM OF WRIST: CPT | Mod: RT

## 2019-03-08 NOTE — HISTORY OF PRESENT ILLNESS
[Chills] : no chills [Fever] : no fever [Nausea] : no nausea [Vomiting] : no vomiting [Clean/Dry/Intact] : clean, dry and intact [Healed] : healed [Erythema] : not erythematous [Neuro Intact] : an unremarkable neurological exam [Vascular Intact] : ~T peripheral vascular exam normal [Negative Davi's] : maneuvers demonstrated a negative Davi's sign [Doing Well] : is doing well [Excellent Pain Control] : has excellent pain control [No Sign of Infection] : is showing no signs of infection [None] : None [de-identified] : S/P ORIF right distal radius fracture 1/10/19  [de-identified] : 52 yo F ritika buckley presents S/P ORIF right distal radius fracture 1/10/19.  She continues to have significant stiffness of her wrist and fingers. She has started therapy for ROM and strengthening. SHe has had 3 or 4 sessions [de-identified] : Physical Exam of the  RT wrist:  There is no erythema or drainage there are no signs of skin breakdown or infection. ROM Patient  cannot yet actively make a fis, however it is better than last time.t . The ROM of the RT wrist is 20 degrees wrist extension and 25 degrees wrist flexion. there is 80 degrees of pronation and supination. There is a normal neurovascular exam and normal capillary refill.   [de-identified] : 3 views of the right wrist taken today and reviewed by me show well fixed right distal radius fracture with hardware in good position no signs of mechanical failure. The fracture appears fully healed [de-identified] : S/P ORIF right distal radius fracture 1/10/19 \par - it is expressed to the patient in both english and Yi, the importance of initiating therapy.  She is instructed on home exercises\par - hand therapy \par - F/U in 4 weeks with XR\par

## 2019-03-21 ENCOUNTER — APPOINTMENT (OUTPATIENT)
Dept: OTOLARYNGOLOGY | Facility: CLINIC | Age: 54
End: 2019-03-21

## 2019-03-31 ENCOUNTER — FORM ENCOUNTER (OUTPATIENT)
Age: 54
End: 2019-03-31

## 2019-04-01 ENCOUNTER — OUTPATIENT (OUTPATIENT)
Dept: OUTPATIENT SERVICES | Facility: HOSPITAL | Age: 54
LOS: 1 days | End: 2019-04-01
Payer: SELF-PAY

## 2019-04-01 ENCOUNTER — APPOINTMENT (OUTPATIENT)
Age: 54
End: 2019-04-01
Payer: COMMERCIAL

## 2019-04-01 DIAGNOSIS — Z98.51 TUBAL LIGATION STATUS: Chronic | ICD-10-CM

## 2019-04-01 DIAGNOSIS — Z90.710 ACQUIRED ABSENCE OF BOTH CERVIX AND UTERUS: Chronic | ICD-10-CM

## 2019-04-01 DIAGNOSIS — E78.5 HYPERLIPIDEMIA, UNSPECIFIED: ICD-10-CM

## 2019-04-01 DIAGNOSIS — Z98.89 OTHER SPECIFIED POSTPROCEDURAL STATES: Chronic | ICD-10-CM

## 2019-04-01 DIAGNOSIS — Z00.00 ENCOUNTER FOR GENERAL ADULT MEDICAL EXAMINATION WITHOUT ABNORMAL FINDINGS: ICD-10-CM

## 2019-04-01 DIAGNOSIS — M25.511 PAIN IN RIGHT SHOULDER: ICD-10-CM

## 2019-04-01 PROCEDURE — 77063 BREAST TOMOSYNTHESIS BI: CPT | Mod: 26

## 2019-04-01 PROCEDURE — 77067 SCR MAMMO BI INCL CAD: CPT

## 2019-04-01 PROCEDURE — 77063 BREAST TOMOSYNTHESIS BI: CPT

## 2019-04-01 PROCEDURE — 77067 SCR MAMMO BI INCL CAD: CPT | Mod: 26

## 2019-04-07 ENCOUNTER — FORM ENCOUNTER (OUTPATIENT)
Age: 54
End: 2019-04-07

## 2019-04-08 ENCOUNTER — OUTPATIENT (OUTPATIENT)
Dept: OUTPATIENT SERVICES | Facility: HOSPITAL | Age: 54
LOS: 1 days | End: 2019-04-08
Payer: SELF-PAY

## 2019-04-08 ENCOUNTER — APPOINTMENT (OUTPATIENT)
Dept: MAMMOGRAPHY | Facility: IMAGING CENTER | Age: 54
End: 2019-04-08
Payer: COMMERCIAL

## 2019-04-08 DIAGNOSIS — Z98.89 OTHER SPECIFIED POSTPROCEDURAL STATES: Chronic | ICD-10-CM

## 2019-04-08 DIAGNOSIS — Z90.710 ACQUIRED ABSENCE OF BOTH CERVIX AND UTERUS: Chronic | ICD-10-CM

## 2019-04-08 DIAGNOSIS — Z98.51 TUBAL LIGATION STATUS: Chronic | ICD-10-CM

## 2019-04-08 DIAGNOSIS — Z00.8 ENCOUNTER FOR OTHER GENERAL EXAMINATION: ICD-10-CM

## 2019-04-08 PROCEDURE — 77065 DX MAMMO INCL CAD UNI: CPT

## 2019-04-08 PROCEDURE — G0279: CPT | Mod: 26

## 2019-04-08 PROCEDURE — 77065 DX MAMMO INCL CAD UNI: CPT | Mod: 26,LT

## 2019-04-08 PROCEDURE — G0279: CPT

## 2019-04-16 ENCOUNTER — APPOINTMENT (OUTPATIENT)
Dept: OBGYN | Facility: CLINIC | Age: 54
End: 2019-04-16
Payer: COMMERCIAL

## 2019-04-16 ENCOUNTER — OUTPATIENT (OUTPATIENT)
Dept: OUTPATIENT SERVICES | Facility: HOSPITAL | Age: 54
LOS: 1 days | End: 2019-04-16
Payer: SELF-PAY

## 2019-04-16 VITALS
DIASTOLIC BLOOD PRESSURE: 90 MMHG | HEIGHT: 64 IN | SYSTOLIC BLOOD PRESSURE: 140 MMHG | WEIGHT: 184 LBS | BODY MASS INDEX: 31.41 KG/M2

## 2019-04-16 DIAGNOSIS — Z01.419 ENCOUNTER FOR GYNECOLOGICAL EXAMINATION (GENERAL) (ROUTINE) WITHOUT ABNORMAL FINDINGS: ICD-10-CM

## 2019-04-16 DIAGNOSIS — Z98.51 TUBAL LIGATION STATUS: Chronic | ICD-10-CM

## 2019-04-16 DIAGNOSIS — N76.0 ACUTE VAGINITIS: ICD-10-CM

## 2019-04-16 DIAGNOSIS — Z98.89 OTHER SPECIFIED POSTPROCEDURAL STATES: Chronic | ICD-10-CM

## 2019-04-16 DIAGNOSIS — Z90.710 ACQUIRED ABSENCE OF BOTH CERVIX AND UTERUS: Chronic | ICD-10-CM

## 2019-04-16 PROCEDURE — G0463: CPT

## 2019-04-16 PROCEDURE — 99396 PREV VISIT EST AGE 40-64: CPT | Mod: 25,GC

## 2019-04-17 NOTE — HISTORY OF PRESENT ILLNESS
[1 Year Ago] : 1 year ago [Good] : being in good health [Healthy Diet] : a healthy diet [Regular Exercise] : regular exercise [Postmenopausal] : is postmenopausal [Up to Date] : up to date with ~his/her~ immunizations [Sexually Active] : is sexually active [Monogamous] : is monogamous [Contraception] : uses contraception [Weight Concerns] : no concerns with her weight

## 2019-04-17 NOTE — PHYSICAL EXAM
[Awake] : awake [Alert] : alert [Soft] : soft [Oriented x3] : oriented to person, place, and time [Labia Majora] : labia major [Labia Minora] : labia minora [Normal] : clitoris [Pink Rugae] : pink rugae [Absent] : absent [No Tenderness] : no rectal tenderness [Nl Sphincter Tone] : normal sphincter tone [Acute Distress] : no acute distress [LAD] : no lymphadenopathy [Thyroid Nodule] : no thyroid nodule [Goiter] : no goiter [Mass] : no breast mass [Tender] : non tender [Axillary LAD] : no axillary lymphadenopathy [Nipple Discharge] : no nipple discharge [Depressed Mood] : not depressed [Distended] : not distended [Flat Affect] : affect not flat [Discharge] : no discharge

## 2019-05-16 ENCOUNTER — OUTPATIENT (OUTPATIENT)
Dept: OUTPATIENT SERVICES | Facility: HOSPITAL | Age: 54
LOS: 1 days | Discharge: HOME | End: 2019-05-16

## 2019-05-16 ENCOUNTER — APPOINTMENT (OUTPATIENT)
Dept: OTOLARYNGOLOGY | Facility: CLINIC | Age: 54
End: 2019-05-16
Payer: COMMERCIAL

## 2019-05-16 VITALS
HEIGHT: 64 IN | HEART RATE: 89 BPM | WEIGHT: 184 LBS | DIASTOLIC BLOOD PRESSURE: 89 MMHG | BODY MASS INDEX: 31.41 KG/M2 | SYSTOLIC BLOOD PRESSURE: 141 MMHG

## 2019-05-16 DIAGNOSIS — Z98.51 TUBAL LIGATION STATUS: Chronic | ICD-10-CM

## 2019-05-16 DIAGNOSIS — Z98.89 OTHER SPECIFIED POSTPROCEDURAL STATES: Chronic | ICD-10-CM

## 2019-05-16 DIAGNOSIS — Z90.710 ACQUIRED ABSENCE OF BOTH CERVIX AND UTERUS: Chronic | ICD-10-CM

## 2019-05-16 PROCEDURE — 99214 OFFICE O/P EST MOD 30 MIN: CPT | Mod: 25

## 2019-05-16 PROCEDURE — 31575 DIAGNOSTIC LARYNGOSCOPY: CPT

## 2019-05-17 NOTE — REASON FOR VISIT
[[employer] ] : provided by [employer]   [Source: ______] : History obtained from [unfilled] [Subsequent Evaluation] : a subsequent evaluation for [FreeTextEntry1] : [id number] [FreeTextEntry2] : Nilsa

## 2019-05-17 NOTE — HISTORY OF PRESENT ILLNESS
[de-identified] : s/p left thyroid lobectomy 5/30/18 hurthle cell adenoma. Pt c/o painful swallowing with liquids and solids. Pain is intense but it comes and goes. Gets better with ibuprofen. Started in January after an episode of coughing.No weight loss. Labs in jan TSH 2.54 and Free T4 1.2 WNL.   December wrist fracture pt followed By Dr. Louise Gordon.\par No fever, weakness or weight loss.

## 2019-05-17 NOTE — PHYSICAL EXAM
[Midline] : trachea located in midline position [Normal] : no rashes [de-identified] : incision well healed.

## 2019-05-17 NOTE — HISTORY OF PRESENT ILLNESS
[de-identified] : s/p left thyroid lobectomy 5/30/18 hurthle cell adenoma. Pt c/o painful swallowing with liquids and solids. Pain is intense but it comes and goes. Gets better with ibuprofen. Started in January after an episode of coughing.No weight loss. Labs in jan TSH 2.54 and Free T4 1.2 WNL.   December wrist fracture pt followed By Dr. Louise Gordon.\par No fever, weakness or weight loss.

## 2019-05-17 NOTE — CONSULT LETTER
[Dear  ___] : Dear  [unfilled], [Consult Closing:] : Thank you very much for allowing me to participate in the care of this patient.  If you have any questions, please do not hesitate to contact me. [Courtesy Letter:] : I had the pleasure of seeing your patient, [unfilled], in my office today. [Please see my note below.] : Please see my note below. [FreeTextEntry3] : \par Daniel Acosta MD, FACS\par \par Otolaryngology-Head and Neck Surgery\par [employer] [facility]\par  [FreeTextEntry2] : Dr Gisele Gardiner  [Sincerely,] : Sincerely,

## 2019-05-17 NOTE — PHYSICAL EXAM
[Midline] : trachea located in midline position [Normal] : no rashes [de-identified] : incision well healed.

## 2019-05-17 NOTE — REASON FOR VISIT
[[employer] ] : provided by [employer]   [Source: ______] : History obtained from [unfilled] [Subsequent Evaluation] : a subsequent evaluation for [FreeTextEntry2] : Nilsa [FreeTextEntry1] : [id number]

## 2019-05-17 NOTE — CONSULT LETTER
[Dear  ___] : Dear  [unfilled], [Please see my note below.] : Please see my note below. [Courtesy Letter:] : I had the pleasure of seeing your patient, [unfilled], in my office today. [Consult Closing:] : Thank you very much for allowing me to participate in the care of this patient.  If you have any questions, please do not hesitate to contact me. [Sincerely,] : Sincerely, [FreeTextEntry3] : \par Daniel Acosta MD, FACS\par \par Otolaryngology-Head and Neck Surgery\par [employer] at [facility]\par  [FreeTextEntry2] : Dr Gisele Gardiner

## 2019-05-20 ENCOUNTER — FORM ENCOUNTER (OUTPATIENT)
Age: 54
End: 2019-05-20

## 2019-05-21 ENCOUNTER — OUTPATIENT (OUTPATIENT)
Dept: OUTPATIENT SERVICES | Facility: HOSPITAL | Age: 54
LOS: 1 days | End: 2019-05-21

## 2019-05-21 ENCOUNTER — APPOINTMENT (OUTPATIENT)
Dept: CT IMAGING | Facility: CLINIC | Age: 54
End: 2019-05-21
Payer: COMMERCIAL

## 2019-05-21 DIAGNOSIS — N76.0 ACUTE VAGINITIS: ICD-10-CM

## 2019-05-21 DIAGNOSIS — E04.1 NONTOXIC SINGLE THYROID NODULE: ICD-10-CM

## 2019-05-21 DIAGNOSIS — Z90.710 ACQUIRED ABSENCE OF BOTH CERVIX AND UTERUS: Chronic | ICD-10-CM

## 2019-05-21 DIAGNOSIS — Z98.89 OTHER SPECIFIED POSTPROCEDURAL STATES: Chronic | ICD-10-CM

## 2019-05-21 DIAGNOSIS — Z98.51 TUBAL LIGATION STATUS: Chronic | ICD-10-CM

## 2019-05-21 PROCEDURE — 70491 CT SOFT TISSUE NECK W/DYE: CPT | Mod: 26

## 2019-05-30 DIAGNOSIS — D49.7 NEOPLASM OF UNSPECIFIED BEHAVIOR OF ENDOCRINE GLANDS AND OTHER PARTS OF NERVOUS SYSTEM: ICD-10-CM

## 2019-05-30 DIAGNOSIS — R13.10 DYSPHAGIA, UNSPECIFIED: ICD-10-CM

## 2019-06-05 ENCOUNTER — APPOINTMENT (OUTPATIENT)
Dept: ORTHOPEDIC SURGERY | Facility: CLINIC | Age: 54
End: 2019-06-05
Payer: MEDICAID

## 2019-06-05 VITALS
HEART RATE: 98 BPM | BODY MASS INDEX: 31.41 KG/M2 | HEIGHT: 64 IN | DIASTOLIC BLOOD PRESSURE: 80 MMHG | WEIGHT: 184 LBS | SYSTOLIC BLOOD PRESSURE: 120 MMHG

## 2019-06-05 PROCEDURE — 99213 OFFICE O/P EST LOW 20 MIN: CPT

## 2019-06-05 PROCEDURE — 73110 X-RAY EXAM OF WRIST: CPT | Mod: RT

## 2019-08-30 ENCOUNTER — APPOINTMENT (OUTPATIENT)
Dept: OTOLARYNGOLOGY | Facility: CLINIC | Age: 54
End: 2019-08-30
Payer: COMMERCIAL

## 2019-08-30 VITALS
WEIGHT: 186 LBS | DIASTOLIC BLOOD PRESSURE: 85 MMHG | BODY MASS INDEX: 31.76 KG/M2 | SYSTOLIC BLOOD PRESSURE: 132 MMHG | HEIGHT: 64 IN | HEART RATE: 80 BPM

## 2019-08-30 PROCEDURE — 99214 OFFICE O/P EST MOD 30 MIN: CPT

## 2019-08-30 NOTE — CONSULT LETTER
[Dear  ___] : Dear  [unfilled], [Courtesy Letter:] : I had the pleasure of seeing your patient, [unfilled], in my office today. [Please see my note below.] : Please see my note below. [Consult Closing:] : Thank you very much for allowing me to participate in the care of this patient.  If you have any questions, please do not hesitate to contact me. [Sincerely,] : Sincerely, [FreeTextEntry3] : Daniel Acosta MD, FACS\par \par Otolaryngology-Head and Neck Surgery\par [employer] at [facility]\par  [FreeTextEntry2] : Dr Gisele Gardiner

## 2019-08-30 NOTE — PHYSICAL EXAM
[de-identified] : incision well healed.  [Midline] : trachea located in midline position [Normal] : no rashes

## 2019-08-30 NOTE — REASON FOR VISIT
[Subsequent Evaluation] : a subsequent evaluation for [[employer] ] : provided by [employer]   [FreeTextEntry1] : [id number] [FreeTextEntry2] : Anisa [TWNoteComboBox1] : Greek

## 2019-08-30 NOTE — HISTORY OF PRESENT ILLNESS
[de-identified] : s/p left thyroid lobectomy 5/30/18 hurthle cell adenoma. December wrist fracture pt followed By Dr. Louise Gordon.\par pt is here to f.u ct result. pt has no c.o. Pain improved with NSAIDs and completely resolved. pt admits to have snoring. No apneas or daytime somnolence.\par pt denies of dysphagia, dyspnea, wt change. No fever, weakness or weight loss. \par Complete review of systems which was performed during a previous encounter was reviewed with the patient and there are no changes except as stated in the HPI section.\par

## 2019-09-24 ENCOUNTER — FORM ENCOUNTER (OUTPATIENT)
Age: 54
End: 2019-09-24

## 2019-09-25 ENCOUNTER — OUTPATIENT (OUTPATIENT)
Dept: OUTPATIENT SERVICES | Facility: HOSPITAL | Age: 54
LOS: 1 days | End: 2019-09-25
Payer: SELF-PAY

## 2019-09-25 ENCOUNTER — APPOINTMENT (OUTPATIENT)
Dept: CT IMAGING | Facility: CLINIC | Age: 54
End: 2019-09-25

## 2019-09-25 ENCOUNTER — APPOINTMENT (OUTPATIENT)
Dept: INTERNAL MEDICINE | Facility: CLINIC | Age: 54
End: 2019-09-25

## 2019-09-25 VITALS
SYSTOLIC BLOOD PRESSURE: 130 MMHG | BODY MASS INDEX: 33.46 KG/M2 | HEIGHT: 64 IN | WEIGHT: 196 LBS | DIASTOLIC BLOOD PRESSURE: 90 MMHG

## 2019-09-25 DIAGNOSIS — Z98.51 TUBAL LIGATION STATUS: Chronic | ICD-10-CM

## 2019-09-25 DIAGNOSIS — I10 ESSENTIAL (PRIMARY) HYPERTENSION: ICD-10-CM

## 2019-09-25 DIAGNOSIS — Z90.710 ACQUIRED ABSENCE OF BOTH CERVIX AND UTERUS: Chronic | ICD-10-CM

## 2019-09-25 DIAGNOSIS — Z98.89 OTHER SPECIFIED POSTPROCEDURAL STATES: Chronic | ICD-10-CM

## 2019-09-25 DIAGNOSIS — R51 HEADACHE: ICD-10-CM

## 2019-09-25 PROCEDURE — 70450 CT HEAD/BRAIN W/O DYE: CPT

## 2019-09-25 PROCEDURE — 70450 CT HEAD/BRAIN W/O DYE: CPT | Mod: 26

## 2019-09-25 RX ORDER — MELOXICAM 15 MG/1
15 TABLET ORAL DAILY
Qty: 30 | Refills: 1 | Status: DISCONTINUED | COMMUNITY
Start: 2019-01-30 | End: 2019-09-25

## 2019-09-25 RX ORDER — OXYCODONE HYDROCHLORIDE AND ACETAMINOPHEN 10; 325 MG/1; MG/1
TABLET ORAL
Refills: 0 | Status: DISCONTINUED | COMMUNITY
End: 2019-09-25

## 2019-09-25 RX ORDER — OXYCODONE HYDROCHLORIDE 30 MG/1
TABLET ORAL
Refills: 0 | Status: DISCONTINUED | COMMUNITY
End: 2019-09-25

## 2019-09-25 RX ORDER — IBUPROFEN 800 MG/1
800 TABLET, FILM COATED ORAL EVERY 6 HOURS
Qty: 120 | Refills: 1 | Status: DISCONTINUED | COMMUNITY
Start: 2019-01-30 | End: 2019-09-25

## 2019-09-30 NOTE — HISTORY OF PRESENT ILLNESS
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 Two months ago, she began experiencing intermittent sharp 10/10 occipital headaches. The headaches occur randomly but essentially every day, and they prevent her from sleeping. She has only been sleeping for 2 hrs for the past 3 nights. She reports blurry vision with the headaches and needs to close her eyes due to the pain but does not endorse photophobia. The pain worsens with palpation. These headaches are noticeably worse than her usual headaches, which are band-like and resolve with ibuprofen. Of note, her brother had similar symptoms in [facility], but did not have money to receive treatment and passed away reportedly due to a heart attack. No history of DVT/PE. Denies loss of vision, fevers, chills, nausea, and vomiting.

## 2019-09-30 NOTE — END OF VISIT
[] : Resident [FreeTextEntry3] : 53 year old Occitan speaking woman history of Hurthle cell adenoma s/p left thyroid lobectomy 5/2018 presents for follow up. Patient with c/o 10/10 worsening sharp b/l occipital HA x 2 months. New from previous HA that were b/l temporal in nature relieved with Advil. No precipitating factor. Nothing makes HA worse. Typically comes on at night or throughout the day. Advil sometimes helps. Previous Rx for Percocet and Oxycodone do help however limiting use. No focal neurological deficits. No N/V. No weight loss. \par No other complaints today. \par VS reviewed. PE as above. Point tenderness over occipital region. No tenderness b/l trapezius. Neurological exam wnl.  \par HA possibly MSK in origin. Will r/o structural causes given age, different quality and history of malignancy. \par CT Head. Meloxicam 15mg QD PRN for severe pain. Stretching exercises. Sleep hygiene discussed. \par RTC 5 weeks for reevaluation \par

## 2019-09-30 NOTE — REVIEW OF SYSTEMS
[Fatigue] : fatigue [Negative] : Genitourinary [Fever] : no fever [Chills] : no chills [Pain] : no pain [Redness] : no redness [FreeTextEntry4] : B/l occipital pain [FreeTextEntry3] : Blurry vision w/ headaches

## 2019-09-30 NOTE — PHYSICAL EXAM
[No Acute Distress] : no acute distress [Well-Appearing] : well-appearing [Well Developed] : well developed [Normal Sclera/Conjunctiva] : normal sclera/conjunctiva [PERRL] : pupils equal round and reactive to light [Normal Outer Ear/Nose] : the outer ears and nose were normal in appearance [Normal Oropharynx] : the oropharynx was normal [EOMI] : extraocular movements intact [Supple] : supple [No Respiratory Distress] : no respiratory distress  [Clear to Auscultation] : lungs were clear to auscultation bilaterally [Normal Rate] : normal rate  [Regular Rhythm] : with a regular rhythm [Normal S1, S2] : normal S1 and S2 [No Murmur] : no murmur heard [No Edema] : there was no peripheral edema [No Varicosities] : no varicosities [No Extremity Clubbing/Cyanosis] : no extremity clubbing/cyanosis [Non Tender] : non-tender [Soft] : abdomen soft [Normal Bowel Sounds] : normal bowel sounds [Non-distended] : non-distended [No Joint Swelling] : no joint swelling [Grossly Normal Strength/Tone] : grossly normal strength/tone [No Focal Deficits] : no focal deficits [Coordination Grossly Intact] : coordination grossly intact [Normal Gait] : normal gait [de-identified] : point tenderness over posterior neck muscles  [de-identified] : bilateral occipital tenderness to palpation

## 2019-10-04 DIAGNOSIS — M54.2 CERVICALGIA: ICD-10-CM

## 2019-10-04 DIAGNOSIS — E04.1 NONTOXIC SINGLE THYROID NODULE: ICD-10-CM

## 2019-10-08 ENCOUNTER — FORM ENCOUNTER (OUTPATIENT)
Age: 54
End: 2019-10-08

## 2019-10-09 ENCOUNTER — OUTPATIENT (OUTPATIENT)
Dept: OUTPATIENT SERVICES | Facility: HOSPITAL | Age: 54
LOS: 1 days | End: 2019-10-09

## 2019-10-09 ENCOUNTER — APPOINTMENT (OUTPATIENT)
Dept: MAMMOGRAPHY | Facility: IMAGING CENTER | Age: 54
End: 2019-10-09
Payer: COMMERCIAL

## 2019-10-09 ENCOUNTER — OTHER (OUTPATIENT)
Age: 54
End: 2019-10-09

## 2019-10-09 DIAGNOSIS — R92.8 OTHER ABNORMAL AND INCONCLUSIVE FINDINGS ON DIAGNOSTIC IMAGING OF BREAST: ICD-10-CM

## 2019-10-09 DIAGNOSIS — Z98.89 OTHER SPECIFIED POSTPROCEDURAL STATES: Chronic | ICD-10-CM

## 2019-10-09 DIAGNOSIS — Z90.710 ACQUIRED ABSENCE OF BOTH CERVIX AND UTERUS: Chronic | ICD-10-CM

## 2019-10-09 DIAGNOSIS — Z98.51 TUBAL LIGATION STATUS: Chronic | ICD-10-CM

## 2019-10-09 PROCEDURE — G0279: CPT | Mod: 26

## 2019-10-09 PROCEDURE — 77065 DX MAMMO INCL CAD UNI: CPT | Mod: 26,LT

## 2019-10-15 ENCOUNTER — APPOINTMENT (OUTPATIENT)
Dept: ORTHOPEDIC SURGERY | Facility: CLINIC | Age: 54
End: 2019-10-15
Payer: MEDICAID

## 2019-10-15 PROCEDURE — 73110 X-RAY EXAM OF WRIST: CPT | Mod: RT

## 2019-10-15 PROCEDURE — 99213 OFFICE O/P EST LOW 20 MIN: CPT

## 2019-10-17 NOTE — HISTORY OF PRESENT ILLNESS
[Fever] : no fever [Chills] : no chills [Nausea] : no nausea [Clean/Dry/Intact] : clean, dry and intact [Healed] : healed [Vomiting] : no vomiting [Neuro Intact] : an unremarkable neurological exam [Erythema] : not erythematous [Vascular Intact] : ~T peripheral vascular exam normal [Negative Davi's] : maneuvers demonstrated a negative Davi's sign [Doing Well] : is doing well [Excellent Pain Control] : has excellent pain control [No Sign of Infection] : is showing no signs of infection [de-identified] : S/P ORIF right distal radius fracture 1/10/19  [None] : None [de-identified] : Physical Exam of the  RT wrist:  There is no erythema or drainage there are no signs of skin breakdown or infection. ROM Patient  can actively make a fist,t . The ROM of the RT wrist is 60 degrees wrist extension and 50  degrees wrist flexion. there is 90 degrees of pronation and supination. There is a normal neurovascular exam and normal capillary refill.   [de-identified] : 54 yo F ritika buckley presents S/P ORIF right distal radius fracture 1/10/19.  She states that her function is improving.  She is No longer participating in aggressive therapy. She has only occasional pain With prolonged activity [de-identified] : 3 views of the right wrist taken today and reviewed by me show well fixed right distal radius fracture with hardware in good position no signs of mechanical failure. The fracture appears fully healed [de-identified] : S/P ORIF right distal radius fracture 1/10/19 \par - She is given a home exercise program to continue to strengthen her hand and wrist muscles. I believe this is the primary source of her discomfort after prolonged activity. She will be treated with ibuprofen PRN. We'll see her back on a p.r.n. basis

## 2019-10-31 ENCOUNTER — APPOINTMENT (OUTPATIENT)
Dept: INTERNAL MEDICINE | Facility: CLINIC | Age: 54
End: 2019-10-31

## 2019-11-13 ENCOUNTER — APPOINTMENT (OUTPATIENT)
Dept: INTERNAL MEDICINE | Facility: CLINIC | Age: 54
End: 2019-11-13

## 2019-11-13 ENCOUNTER — LABORATORY RESULT (OUTPATIENT)
Age: 54
End: 2019-11-13

## 2019-11-13 ENCOUNTER — OUTPATIENT (OUTPATIENT)
Dept: OUTPATIENT SERVICES | Facility: HOSPITAL | Age: 54
LOS: 1 days | End: 2019-11-13
Payer: SELF-PAY

## 2019-11-13 VITALS
OXYGEN SATURATION: 95 % | WEIGHT: 200 LBS | SYSTOLIC BLOOD PRESSURE: 122 MMHG | BODY MASS INDEX: 34.15 KG/M2 | HEIGHT: 64 IN | HEART RATE: 93 BPM | DIASTOLIC BLOOD PRESSURE: 80 MMHG

## 2019-11-13 DIAGNOSIS — I10 ESSENTIAL (PRIMARY) HYPERTENSION: ICD-10-CM

## 2019-11-13 DIAGNOSIS — Z98.51 TUBAL LIGATION STATUS: Chronic | ICD-10-CM

## 2019-11-13 DIAGNOSIS — Z90.710 ACQUIRED ABSENCE OF BOTH CERVIX AND UTERUS: Chronic | ICD-10-CM

## 2019-11-13 DIAGNOSIS — Z98.89 OTHER SPECIFIED POSTPROCEDURAL STATES: Chronic | ICD-10-CM

## 2019-11-13 PROCEDURE — 84439 ASSAY OF FREE THYROXINE: CPT

## 2019-11-13 PROCEDURE — 84443 ASSAY THYROID STIM HORMONE: CPT

## 2019-11-13 PROCEDURE — G0463: CPT

## 2019-11-13 NOTE — REVIEW OF SYSTEMS
[Recent Change In Weight] : ~T recent weight change [Back Pain] : back pain [Muscle Pain] : muscle pain [Fever] : no fever [Chills] : no chills [Vision Problems] : no vision problems [Chest Pain] : no chest pain [Palpitations] : no palpitations [Lower Ext Edema] : no lower extremity edema [Dyspnea on Exertion] : no dyspnea on exertion [Shortness Of Breath] : no shortness of breath [Nausea] : no nausea [Abdominal Pain] : no abdominal pain [Dizziness] : no dizziness [Vomiting] : no vomiting [Fainting] : no fainting [FreeTextEntry2] : 20 lb weight gain in the past 6 months

## 2019-11-13 NOTE — HISTORY OF PRESENT ILLNESS
[de-identified] : This is a 54 year old female with hx of Hurthle Cell Adenoma s/p left thyroid lobectomy (5/2018), colles fracture s/p ORIF in 1/2019 who presents for follow up. Patient was last seen by Dr. Krishna for an occipital headache - - which appears to be MSK in origin given resolution of headache with prescribed meloxicam. Patient was ordered for CTH given prior malignancy which was unremarkable. \par \par Spoke with patient using [id number] \par \par Patient presents today with persistence of the tension in the shoulders. States that the headache is much improved by the meloxicam that she was prescribed. Reports that because of the tension she cannot sleep at night which happens three to four times a week. Denies any numbness in arms or weakness. Denies any vision changes, nausea, vomiting. \par \par Patient also presents today with weight gain as a concern and on chart review it appears that the patient had a weight gain of 20 lbs. Patient reports that she is walking an hour and a half every day and has not been eating that much and is concerned that this might be related to her thyroid. On a typical day patient reports will eat 1) 1 strawberry and yogurt, with coffee. Patient will sometimes have an egg yolk with bread and will occasionally have a fruit for breakfast 2) For lunch: will have a roasted chicken, with vegetables or salmon. Patient will occasionally have a fish soup 3) For dinner will not eat anything, Will have a shake (will have fruit and vegetable shake). Will not have anything after 6 pm. Occasionally will have a glass of milk if she feels hungry. Denies feeling cold at all. Reports that she always hair loss and has been on Biotin oral supplement with shampoo. Patient also uses aloe gel with honey on her head without hair loss. States that she follows with her endocrinologist every year and was last seen in August of this year.

## 2019-11-13 NOTE — ASSESSMENT
[FreeTextEntry1] : 54 year old female with hx of Hurthle Cell Adenoma s/p left thyroid lobectomy (5/2018), colles fracture s/p ORIF in 1/2019 who presents for follow up with complaints of neck tightness and weight gain \par \par # HCM\par - due for right sided mammogram, last been having diagnostic mammograms of the left breast (probable benign group of calcifications noted in 10/2019, likely compatible with devolving fibroadenoma), will give referral for right sided mammogram \par - flu shot and tdap today \par \par # muscle tension of the upper back\par - patient with significant tension in the trapezius and cervical paraspinal tightness \par - discussed lifestyle interventions such as massage, steam to help alleviate some of the tension\par - spurling test negative and given clinical hx - - > concern for cervical radiculopathy less \par \par # Weight gain\par - patient with considerable 20 lb weight gain since the beginning of the year\par - despite not eating dinner, on a considerable calorie intake\par - counseled on keeping a food diary and will see a nutritionist for further dietary modifications \par - given hx of thyroid CA with left thyroid lobectomy, concern for hypothyroidism less likely but given that we do not have TFTS this year, will check TSH w/ FT4 \par - has seen endo in August and follows yearly \par \par Case discussed with Dr. Hopkins

## 2019-11-14 LAB
T4 FREE SERPL-MCNC: 1.2 NG/DL — SIGNIFICANT CHANGE UP (ref 0.9–1.8)
T4 FREE+ TSH PNL SERPL: 4.38 UIU/ML — HIGH (ref 0.27–4.2)

## 2019-11-19 DIAGNOSIS — R63.5 ABNORMAL WEIGHT GAIN: ICD-10-CM

## 2019-11-19 DIAGNOSIS — C73 MALIGNANT NEOPLASM OF THYROID GLAND: ICD-10-CM

## 2019-12-03 ENCOUNTER — APPOINTMENT (OUTPATIENT)
Dept: INTERNAL MEDICINE | Facility: CLINIC | Age: 54
End: 2019-12-03

## 2019-12-03 ENCOUNTER — OUTPATIENT (OUTPATIENT)
Dept: OUTPATIENT SERVICES | Facility: HOSPITAL | Age: 54
LOS: 1 days | End: 2019-12-03
Payer: SELF-PAY

## 2019-12-03 DIAGNOSIS — I10 ESSENTIAL (PRIMARY) HYPERTENSION: ICD-10-CM

## 2019-12-03 DIAGNOSIS — Z98.51 TUBAL LIGATION STATUS: Chronic | ICD-10-CM

## 2019-12-03 DIAGNOSIS — Z98.89 OTHER SPECIFIED POSTPROCEDURAL STATES: Chronic | ICD-10-CM

## 2019-12-03 DIAGNOSIS — Z90.710 ACQUIRED ABSENCE OF BOTH CERVIX AND UTERUS: Chronic | ICD-10-CM

## 2019-12-03 DIAGNOSIS — E66.3 OVERWEIGHT: ICD-10-CM

## 2019-12-03 PROCEDURE — 97802 MEDICAL NUTRITION INDIV IN: CPT

## 2019-12-09 NOTE — ED PROVIDER NOTE - PHYSICAL EXAMINATION
CONSTITUTIONAL: Patient is awake, alert and oriented x 3. Patient is well appearing and in no acute distress.  HEAD: NCAT,  EYES: PERRL b/l, EOMI,   ENT: Airway patent, Nasal mucosa clear. Mouth with normal mucosa. Throat has no vesicles, no oropharyngeal exudates and uvula is midline.  LUNGS: CTA B/L,  HEART: RRR.+S1S2 no murmurs,   ABDOMEN: Soft nd/nt+bs no rebound or guarding.   EXTREMITY: no edema or calf tenderness b/l, FROM left upper and lower ext b/l. RUE with diffuse  swelling from forearm to hand. Ttp of the right elbow, forearm wrist and hand. There is obvious deformity to the right distal radius. Patent with normal gross sensation with 2+ palpable radial pulse   SKIN: with no rash or lesions.   NEURO: No focal deficits CONSTITUTIONAL: Patient is awake, alert and oriented x 3. Patient is well appearing and in no acute distress.  HEAD: NCAT,  EYES: PERRL b/l, EOMI,   ENT: Airway patent, Nasal mucosa clear. Mouth with normal mucosa. Throat has no vesicles, no oropharyngeal exudates and uvula is midline.  LUNGS: CTA B/L,  HEART: RRR.+S1S2 no murmurs,   ABDOMEN: Soft nd/nt+bs no rebound or guarding.   EXTREMITY: no edema or calf tenderness b/l, FROM left upper and lower ext b/l. RUE with diffuse  swelling from forearm to hand. Ttp of the right elbow, forearm wrist and hand. There is obvious deformity to the right distal radius. Patent with normal gross sensation with 2+ palpable radial pulse.  Distal NVI.  Soft compartments.  No crepitations.  No lacerations.  SKIN: with no rash or lesions.   NEURO: No focal deficits no

## 2020-01-15 ENCOUNTER — APPOINTMENT (OUTPATIENT)
Dept: ORTHOPEDIC SURGERY | Facility: CLINIC | Age: 55
End: 2020-01-15

## 2020-01-23 ENCOUNTER — OUTPATIENT (OUTPATIENT)
Dept: OUTPATIENT SERVICES | Facility: HOSPITAL | Age: 55
LOS: 1 days | End: 2020-01-23
Payer: SELF-PAY

## 2020-01-23 ENCOUNTER — APPOINTMENT (OUTPATIENT)
Dept: INTERNAL MEDICINE | Facility: CLINIC | Age: 55
End: 2020-01-23

## 2020-01-23 DIAGNOSIS — Z90.710 ACQUIRED ABSENCE OF BOTH CERVIX AND UTERUS: Chronic | ICD-10-CM

## 2020-01-23 DIAGNOSIS — Z98.89 OTHER SPECIFIED POSTPROCEDURAL STATES: Chronic | ICD-10-CM

## 2020-01-23 DIAGNOSIS — Z98.51 TUBAL LIGATION STATUS: Chronic | ICD-10-CM

## 2020-01-23 DIAGNOSIS — I10 ESSENTIAL (PRIMARY) HYPERTENSION: ICD-10-CM

## 2020-01-23 PROCEDURE — 97803 MED NUTRITION INDIV SUBSEQ: CPT

## 2020-01-26 NOTE — ASU DISCHARGE PLAN (ADULT/PEDIATRIC). - POST OP PHONE #
oral
[phone] pt. granted permission to leave message /and or speak with whoever answers the phone.

## 2020-01-27 DIAGNOSIS — E66.3 OVERWEIGHT: ICD-10-CM

## 2020-01-28 ENCOUNTER — LABORATORY RESULT (OUTPATIENT)
Age: 55
End: 2020-01-28

## 2020-01-28 ENCOUNTER — OUTPATIENT (OUTPATIENT)
Dept: OUTPATIENT SERVICES | Facility: HOSPITAL | Age: 55
LOS: 1 days | End: 2020-01-28
Payer: SELF-PAY

## 2020-01-28 ENCOUNTER — APPOINTMENT (OUTPATIENT)
Dept: INTERNAL MEDICINE | Facility: CLINIC | Age: 55
End: 2020-01-28

## 2020-01-28 VITALS
SYSTOLIC BLOOD PRESSURE: 140 MMHG | HEIGHT: 64 IN | DIASTOLIC BLOOD PRESSURE: 90 MMHG | BODY MASS INDEX: 33.8 KG/M2 | WEIGHT: 198 LBS

## 2020-01-28 DIAGNOSIS — I10 ESSENTIAL (PRIMARY) HYPERTENSION: ICD-10-CM

## 2020-01-28 DIAGNOSIS — Z98.89 OTHER SPECIFIED POSTPROCEDURAL STATES: Chronic | ICD-10-CM

## 2020-01-28 DIAGNOSIS — Z98.51 TUBAL LIGATION STATUS: Chronic | ICD-10-CM

## 2020-01-28 DIAGNOSIS — Z90.710 ACQUIRED ABSENCE OF BOTH CERVIX AND UTERUS: Chronic | ICD-10-CM

## 2020-01-28 LAB
HCT VFR BLD CALC: 42 % — SIGNIFICANT CHANGE UP (ref 34.5–45)
HGB BLD-MCNC: 13.8 G/DL — SIGNIFICANT CHANGE UP (ref 11.5–15.5)
MCHC RBC-ENTMCNC: 29.6 PG — SIGNIFICANT CHANGE UP (ref 27–34)
MCHC RBC-ENTMCNC: 32.9 GM/DL — SIGNIFICANT CHANGE UP (ref 32–36)
MCV RBC AUTO: 90.1 FL — SIGNIFICANT CHANGE UP (ref 80–100)
PLATELET # BLD AUTO: 216 K/UL — SIGNIFICANT CHANGE UP (ref 150–400)
RBC # BLD: 4.66 M/UL — SIGNIFICANT CHANGE UP (ref 3.8–5.2)
RBC # FLD: 11.9 % — SIGNIFICANT CHANGE UP (ref 10.3–14.5)
WBC # BLD: 5.52 K/UL — SIGNIFICANT CHANGE UP (ref 3.8–10.5)
WBC # FLD AUTO: 5.52 K/UL — SIGNIFICANT CHANGE UP (ref 3.8–10.5)

## 2020-01-29 ENCOUNTER — LABORATORY RESULT (OUTPATIENT)
Age: 55
End: 2020-01-29

## 2020-01-29 LAB
ALBUMIN SERPL ELPH-MCNC: 4.3 G/DL — SIGNIFICANT CHANGE UP (ref 3.3–5)
ALP SERPL-CCNC: 100 U/L — SIGNIFICANT CHANGE UP (ref 40–120)
ALT FLD-CCNC: 54 U/L — HIGH (ref 10–45)
ANION GAP SERPL CALC-SCNC: 14 MMOL/L — SIGNIFICANT CHANGE UP (ref 5–17)
AST SERPL-CCNC: 30 U/L — SIGNIFICANT CHANGE UP (ref 10–40)
BILIRUB SERPL-MCNC: 0.2 MG/DL — SIGNIFICANT CHANGE UP (ref 0.2–1.2)
BUN SERPL-MCNC: 11 MG/DL — SIGNIFICANT CHANGE UP (ref 7–23)
CALCIUM SERPL-MCNC: 9.3 MG/DL — SIGNIFICANT CHANGE UP (ref 8.4–10.5)
CHLORIDE SERPL-SCNC: 103 MMOL/L — SIGNIFICANT CHANGE UP (ref 96–108)
CO2 SERPL-SCNC: 25 MMOL/L — SIGNIFICANT CHANGE UP (ref 22–31)
CREAT SERPL-MCNC: 0.73 MG/DL — SIGNIFICANT CHANGE UP (ref 0.5–1.3)
GLUCOSE SERPL-MCNC: 95 MG/DL — SIGNIFICANT CHANGE UP (ref 70–99)
POTASSIUM SERPL-MCNC: 3.8 MMOL/L — SIGNIFICANT CHANGE UP (ref 3.5–5.3)
POTASSIUM SERPL-SCNC: 3.8 MMOL/L — SIGNIFICANT CHANGE UP (ref 3.5–5.3)
PROT SERPL-MCNC: 7.3 G/DL — SIGNIFICANT CHANGE UP (ref 6–8.3)
SODIUM SERPL-SCNC: 142 MMOL/L — SIGNIFICANT CHANGE UP (ref 135–145)
T4 FREE+ TSH PNL SERPL: 2.81 UIU/ML — SIGNIFICANT CHANGE UP (ref 0.27–4.2)

## 2020-01-29 PROCEDURE — 85027 COMPLETE CBC AUTOMATED: CPT

## 2020-01-29 PROCEDURE — 84443 ASSAY THYROID STIM HORMONE: CPT

## 2020-01-29 PROCEDURE — G0463: CPT

## 2020-01-29 PROCEDURE — 80053 COMPREHEN METABOLIC PANEL: CPT

## 2020-01-29 PROCEDURE — 87338 HPYLORI STOOL AG IA: CPT

## 2020-01-29 NOTE — REVIEW OF SYSTEMS
[Fever] : no fever [Pain] : no pain [Fatigue] : no fatigue [Chills] : no chills [Earache] : no earache [Sore Throat] : no sore throat [Chest Pain] : no chest pain [Abdominal Pain] : no abdominal pain [Dysuria] : no dysuria [Depression] : no depression [Skin Rash] : no skin rash

## 2020-01-29 NOTE — PHYSICAL EXAM
[No Acute Distress] : no acute distress [No CVA Tenderness] : no CVA  tenderness [No Rash] : no rash [Normal] : affect was normal and insight and judgment were intact [Soft] : abdomen soft [Non Tender] : non-tender [Non-distended] : non-distended [No Masses] : no abdominal mass palpated [No HSM] : no HSM [Normal Bowel Sounds] : normal bowel sounds [No Hernias] : no hernias [de-identified] : smiling, friendly, cooperative [de-identified] : normoactive BS, no tenderness to palpation

## 2020-01-29 NOTE — HISTORY OF PRESENT ILLNESS
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 Occurs almost every day, comes and goes. Feels inflamed. Does not have pain. Worse in afternoon and at night, almost never has it in the morning. Feels bloated. All over abdomen. Denies rash. Endorses constipation also during the past two weeks, with needing to push harder, but has had regular 1 BM per day that is her norm. Denies diarrhea. Denies blood stool, dark sticky stool, floating stool, nausea. Stool consistency is normal. Pt says that her main concern is her stomach grumbling "like it's trying to speak."\par \par Says that she eats a lot of fiber in her diet, and eats a lot of vegetables, broccoli 2 times a week, lettuce 3-4 times a week.\par Pt last had colonoscopy 2018, found tubular adenoma, otherwise unremarkable, rec f/u colonoscopy in 2023.\par \par Denies cold intolerance. Denies fatigue. Denies current depressed mood, says felt depressed once in the past two weeks and she went for a walk with earphones with music, that made it better. Clarified that she had felt anxious during this time. Says that she would not describe herself as an anxious person.\par \par Reports that she no longer has tension in her shoulders.

## 2020-01-29 NOTE — ASSESSMENT
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 MAy be due to heavy vegetable/fiber intake, constipation, hypothyroidism (though not endorsing other sx of hypothyroidism, can get TSH to check).\par \par #Abdominal bloating with constipation and rumbling\par -senna\par -H. pylori testing\par -CBC\par -CMP\par \par #borderline high TSH prior test\par -TSH

## 2020-01-30 DIAGNOSIS — R14.0 ABDOMINAL DISTENSION (GASEOUS): ICD-10-CM

## 2020-01-30 DIAGNOSIS — C73 MALIGNANT NEOPLASM OF THYROID GLAND: ICD-10-CM

## 2020-01-30 LAB — H PYLORI AG STL QL: NEGATIVE — SIGNIFICANT CHANGE UP

## 2020-05-08 ENCOUNTER — APPOINTMENT (OUTPATIENT)
Dept: INTERNAL MEDICINE | Facility: CLINIC | Age: 55
End: 2020-05-08

## 2020-08-24 ENCOUNTER — APPOINTMENT (OUTPATIENT)
Dept: MAMMOGRAPHY | Facility: IMAGING CENTER | Age: 55
End: 2020-08-24
Payer: COMMERCIAL

## 2020-08-24 ENCOUNTER — RESULT REVIEW (OUTPATIENT)
Age: 55
End: 2020-08-24

## 2020-08-24 ENCOUNTER — OUTPATIENT (OUTPATIENT)
Dept: OUTPATIENT SERVICES | Facility: HOSPITAL | Age: 55
LOS: 1 days | End: 2020-08-24
Payer: SELF-PAY

## 2020-08-24 DIAGNOSIS — C73 MALIGNANT NEOPLASM OF THYROID GLAND: ICD-10-CM

## 2020-08-24 DIAGNOSIS — R14.0 ABDOMINAL DISTENSION (GASEOUS): ICD-10-CM

## 2020-08-24 DIAGNOSIS — Z98.51 TUBAL LIGATION STATUS: Chronic | ICD-10-CM

## 2020-08-24 DIAGNOSIS — Z00.00 ENCOUNTER FOR GENERAL ADULT MEDICAL EXAMINATION WITHOUT ABNORMAL FINDINGS: ICD-10-CM

## 2020-08-24 DIAGNOSIS — Z90.710 ACQUIRED ABSENCE OF BOTH CERVIX AND UTERUS: Chronic | ICD-10-CM

## 2020-08-24 DIAGNOSIS — Z98.89 OTHER SPECIFIED POSTPROCEDURAL STATES: Chronic | ICD-10-CM

## 2020-08-24 PROCEDURE — 77066 DX MAMMO INCL CAD BI: CPT | Mod: 26

## 2020-08-24 PROCEDURE — G0279: CPT | Mod: 26

## 2020-08-24 PROCEDURE — G0279: CPT

## 2020-08-24 PROCEDURE — 77066 DX MAMMO INCL CAD BI: CPT

## 2020-08-27 ENCOUNTER — OUTPATIENT (OUTPATIENT)
Dept: OUTPATIENT SERVICES | Facility: HOSPITAL | Age: 55
LOS: 1 days | Discharge: HOME | End: 2020-08-27

## 2020-08-27 ENCOUNTER — APPOINTMENT (OUTPATIENT)
Dept: OTOLARYNGOLOGY | Facility: CLINIC | Age: 55
End: 2020-08-27
Payer: COMMERCIAL

## 2020-08-27 VITALS
WEIGHT: 198 LBS | SYSTOLIC BLOOD PRESSURE: 128 MMHG | HEIGHT: 64 IN | HEART RATE: 89 BPM | DIASTOLIC BLOOD PRESSURE: 80 MMHG | BODY MASS INDEX: 33.8 KG/M2

## 2020-08-27 DIAGNOSIS — Z98.51 TUBAL LIGATION STATUS: Chronic | ICD-10-CM

## 2020-08-27 DIAGNOSIS — Z98.89 OTHER SPECIFIED POSTPROCEDURAL STATES: Chronic | ICD-10-CM

## 2020-08-27 DIAGNOSIS — Z90.710 ACQUIRED ABSENCE OF BOTH CERVIX AND UTERUS: Chronic | ICD-10-CM

## 2020-08-27 PROCEDURE — 99213 OFFICE O/P EST LOW 20 MIN: CPT

## 2020-08-27 NOTE — CONSULT LETTER
[Dear  ___] : Dear  [unfilled], [Courtesy Letter:] : I had the pleasure of seeing your patient, [unfilled], in my office today. [Please see my note below.] : Please see my note below. [Consult Closing:] : Thank you very much for allowing me to participate in the care of this patient.  If you have any questions, please do not hesitate to contact me. [Sincerely,] : Sincerely, [FreeTextEntry2] : Dr Gisele Gardiner  [FreeTextEntry3] : \par Daniel Acosta MD, FACS\par \par Otolaryngology-Head and Neck Surgery\par [employer] [facility]\par

## 2020-08-27 NOTE — HISTORY OF PRESENT ILLNESS
[de-identified] : pt is 55 yro female s/p left thyroid lobectomy 5/30/18 hurthle cell adenoma. December 2018 wrist fracture pt followed By Dr. Louise Gordon. Pain improved with NSAIDs and compresses and completely resolved. pt is here for 1 year f/u. Denies dysphagia, dyspnea, dysphonia. Denies fevers, chills, weight loss.  pt admits that snoring has improved. No apneas or daytime somnolence. Sometimes when the weather is hot, she feels pulsating at the incision site, it turns red and she applies anti-inflammatory cream to the area, which helps. \par Complete review of systems which was performed during a previous encounter was reviewed with the patient and there are no changes except as stated in the HPI section.\par \par  \par Complete review of systems which was performed during a previous encounter was reviewed with the patient and there are no changes except as stated in the HPI section.\par

## 2020-08-27 NOTE — REASON FOR VISIT
[Subsequent Evaluation] : a subsequent evaluation for [[employer] ] : provided by [employer]   [FreeTextEntry1] : [id number] [FreeTextEntry2] : [place] [TWNoteComboBox1] : Faroese

## 2020-09-15 DIAGNOSIS — E04.1 NONTOXIC SINGLE THYROID NODULE: ICD-10-CM

## 2020-12-16 PROBLEM — Z12.11 ENCOUNTER FOR SCREENING COLONOSCOPY: Status: RESOLVED | Noted: 2018-02-13 | Resolved: 2020-12-16

## 2021-02-18 NOTE — DISCHARGE NOTE ADULT - REASON FOR ADMISSION
Pt states she is in [place] and didn't plan on staying so long, but now will be gone for a few more weeks.  She is asking for a refill of her potassium also.  She would [place] these 2 [place]  Pt is asking for about 3 weeks worth.    Rx listed below.  Preferred pharmacy has been set up and verified.      right wrist pain

## 2021-04-20 ENCOUNTER — NON-APPOINTMENT (OUTPATIENT)
Age: 56
End: 2021-04-20

## 2021-04-21 ENCOUNTER — LABORATORY RESULT (OUTPATIENT)
Age: 56
End: 2021-04-21

## 2021-04-21 ENCOUNTER — APPOINTMENT (OUTPATIENT)
Dept: INTERNAL MEDICINE | Facility: CLINIC | Age: 56
End: 2021-04-21

## 2021-04-21 ENCOUNTER — OUTPATIENT (OUTPATIENT)
Dept: OUTPATIENT SERVICES | Facility: HOSPITAL | Age: 56
LOS: 1 days | End: 2021-04-21
Payer: SELF-PAY

## 2021-04-21 VITALS
WEIGHT: 196 LBS | OXYGEN SATURATION: 97 % | DIASTOLIC BLOOD PRESSURE: 82 MMHG | HEART RATE: 99 BPM | BODY MASS INDEX: 33.64 KG/M2 | SYSTOLIC BLOOD PRESSURE: 124 MMHG

## 2021-04-21 DIAGNOSIS — Z90.710 ACQUIRED ABSENCE OF BOTH CERVIX AND UTERUS: Chronic | ICD-10-CM

## 2021-04-21 DIAGNOSIS — Z98.89 OTHER SPECIFIED POSTPROCEDURAL STATES: Chronic | ICD-10-CM

## 2021-04-21 DIAGNOSIS — I10 ESSENTIAL (PRIMARY) HYPERTENSION: ICD-10-CM

## 2021-04-21 DIAGNOSIS — Z98.51 TUBAL LIGATION STATUS: Chronic | ICD-10-CM

## 2021-04-21 LAB
HCT VFR BLD CALC: 42.2 % — SIGNIFICANT CHANGE UP (ref 34.5–45)
HGB BLD-MCNC: 13.2 G/DL — SIGNIFICANT CHANGE UP (ref 11.5–15.5)
MCHC RBC-ENTMCNC: 28.9 PG — SIGNIFICANT CHANGE UP (ref 27–34)
MCHC RBC-ENTMCNC: 31.3 GM/DL — LOW (ref 32–36)
MCV RBC AUTO: 92.5 FL — SIGNIFICANT CHANGE UP (ref 80–100)
PLATELET # BLD AUTO: 225 K/UL — SIGNIFICANT CHANGE UP (ref 150–400)
RBC # BLD: 4.56 M/UL — SIGNIFICANT CHANGE UP (ref 3.8–5.2)
RBC # FLD: 12.6 % — SIGNIFICANT CHANGE UP (ref 10.3–14.5)
WBC # BLD: 4.34 K/UL — SIGNIFICANT CHANGE UP (ref 3.8–10.5)
WBC # FLD AUTO: 4.34 K/UL — SIGNIFICANT CHANGE UP (ref 3.8–10.5)

## 2021-04-21 PROCEDURE — 84443 ASSAY THYROID STIM HORMONE: CPT

## 2021-04-21 PROCEDURE — 80053 COMPREHEN METABOLIC PANEL: CPT

## 2021-04-21 PROCEDURE — 85027 COMPLETE CBC AUTOMATED: CPT

## 2021-04-21 PROCEDURE — 80061 LIPID PANEL: CPT

## 2021-04-21 PROCEDURE — G0463: CPT

## 2021-04-21 PROCEDURE — 83036 HEMOGLOBIN GLYCOSYLATED A1C: CPT

## 2021-04-22 LAB
A1C WITH ESTIMATED AVERAGE GLUCOSE RESULT: 5.6 % — SIGNIFICANT CHANGE UP (ref 4–5.6)
ALBUMIN SERPL ELPH-MCNC: 4.6 G/DL — SIGNIFICANT CHANGE UP (ref 3.3–5)
ALP SERPL-CCNC: 112 U/L — SIGNIFICANT CHANGE UP (ref 40–120)
ALT FLD-CCNC: 32 U/L — SIGNIFICANT CHANGE UP (ref 10–45)
ANION GAP SERPL CALC-SCNC: 12 MMOL/L — SIGNIFICANT CHANGE UP (ref 5–17)
AST SERPL-CCNC: 22 U/L — SIGNIFICANT CHANGE UP (ref 10–40)
BILIRUB SERPL-MCNC: 0.3 MG/DL — SIGNIFICANT CHANGE UP (ref 0.2–1.2)
BUN SERPL-MCNC: 11 MG/DL — SIGNIFICANT CHANGE UP (ref 7–23)
CALCIUM SERPL-MCNC: 9.5 MG/DL — SIGNIFICANT CHANGE UP (ref 8.4–10.5)
CHLORIDE SERPL-SCNC: 105 MMOL/L — SIGNIFICANT CHANGE UP (ref 96–108)
CHOLEST SERPL-MCNC: 254 MG/DL — HIGH
CO2 SERPL-SCNC: 26 MMOL/L — SIGNIFICANT CHANGE UP (ref 22–31)
CREAT SERPL-MCNC: 0.6 MG/DL — SIGNIFICANT CHANGE UP (ref 0.5–1.3)
ESTIMATED AVERAGE GLUCOSE: 114 MG/DL — SIGNIFICANT CHANGE UP (ref 68–114)
GLUCOSE SERPL-MCNC: 97 MG/DL — SIGNIFICANT CHANGE UP (ref 70–99)
HDLC SERPL-MCNC: 47 MG/DL — LOW
LIPID PNL WITH DIRECT LDL SERPL: 167 MG/DL — HIGH
NON HDL CHOLESTEROL: 207 MG/DL — HIGH
POTASSIUM SERPL-MCNC: 3.8 MMOL/L — SIGNIFICANT CHANGE UP (ref 3.5–5.3)
POTASSIUM SERPL-SCNC: 3.8 MMOL/L — SIGNIFICANT CHANGE UP (ref 3.5–5.3)
PROT SERPL-MCNC: 7.5 G/DL — SIGNIFICANT CHANGE UP (ref 6–8.3)
SODIUM SERPL-SCNC: 143 MMOL/L — SIGNIFICANT CHANGE UP (ref 135–145)
T4 FREE+ TSH PNL SERPL: 4.04 UIU/ML — SIGNIFICANT CHANGE UP (ref 0.27–4.2)
TRIGL SERPL-MCNC: 199 MG/DL — HIGH

## 2021-04-23 NOTE — PLAN
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 Endorsed intermittent L. chest pain \par \par #L chest pain \par -Based on hx and physical exam, pain likely 2/2 MSK \par -ECG WNL (sinus rhythm)\par -will f/u mammogram as tenderness on breast \par \par #CPE\par -Due for mammogram; referral given \par -as no cervix, no pap smear \par -next colonoscopy due 2023 \par -f/u CBC, CMP, lipid panel, TSH, and A1c \par -Received first dose of covid moderna vaccine today; scheduled for 2nd dose in 28 days \par -Due for Tdap, but as receiving covid vaccine, advised to complete covid series and then received Tdap 2 weeks after\par \par D/W Dr. Ma\par RTC in 1 year. Patient will call for Tdap 2 weeks after her 2nd moderna vaccine

## 2021-04-23 NOTE — HISTORY OF PRESENT ILLNESS
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 She states that she has been in her usual state of health up until 2 months ago when she started experiencing intermittent "cramping/pulling sensation" in her left breast. In the past week, she noticed having this pain about 4x/week. She denies associated nausea, SOB, or radiation. She further denies any palpitations, MCPHERSON, LE edema, or orthopnea. \par \par She states that she exercises frequently by walking outside. She is able to walk about 45 min to up to 3 hors per day. She also enjoys dancing. Regarding her diet, in the morning she typically eats toast and coffee, and she eats a lot of salad, chicken,\par \par \par Screening: \par Colonoscopy: 3/2019, found 1 tubular adenoma. Repeat in 2023 \par Cervical screening: hysterectomy (cervix removed) 2016\par Mammogram: last mammogram 8/2020 birads 2, repeat this year \par \par Vaccination: \par Tdap 12/2009, due this year. \par Covid vaccine: moderna first dose today

## 2021-04-23 NOTE — PHYSICAL EXAM
[Normal] : affect was normal and insight and judgment were intact [Normal Appearance] : normal in appearance [No Masses] : no palpable masses [de-identified] : mildly tender to palpation superior to L. areola

## 2021-04-30 DIAGNOSIS — R07.89 OTHER CHEST PAIN: ICD-10-CM

## 2021-04-30 DIAGNOSIS — Z00.00 ENCOUNTER FOR GENERAL ADULT MEDICAL EXAMINATION WITHOUT ABNORMAL FINDINGS: ICD-10-CM

## 2021-06-04 ENCOUNTER — RESULT REVIEW (OUTPATIENT)
Age: 56
End: 2021-06-04

## 2021-06-04 ENCOUNTER — OUTPATIENT (OUTPATIENT)
Dept: OUTPATIENT SERVICES | Facility: HOSPITAL | Age: 56
LOS: 1 days | End: 2021-06-04
Payer: SELF-PAY

## 2021-06-04 ENCOUNTER — APPOINTMENT (OUTPATIENT)
Dept: MAMMOGRAPHY | Facility: IMAGING CENTER | Age: 56
End: 2021-06-04
Payer: COMMERCIAL

## 2021-06-04 ENCOUNTER — APPOINTMENT (OUTPATIENT)
Dept: ULTRASOUND IMAGING | Facility: IMAGING CENTER | Age: 56
End: 2021-06-04
Payer: COMMERCIAL

## 2021-06-04 DIAGNOSIS — Z00.00 ENCOUNTER FOR GENERAL ADULT MEDICAL EXAMINATION WITHOUT ABNORMAL FINDINGS: ICD-10-CM

## 2021-06-04 DIAGNOSIS — Z90.710 ACQUIRED ABSENCE OF BOTH CERVIX AND UTERUS: Chronic | ICD-10-CM

## 2021-06-04 DIAGNOSIS — Z98.89 OTHER SPECIFIED POSTPROCEDURAL STATES: Chronic | ICD-10-CM

## 2021-06-04 DIAGNOSIS — R07.89 OTHER CHEST PAIN: ICD-10-CM

## 2021-06-04 DIAGNOSIS — Z98.51 TUBAL LIGATION STATUS: Chronic | ICD-10-CM

## 2021-06-04 DIAGNOSIS — Z00.8 ENCOUNTER FOR OTHER GENERAL EXAMINATION: ICD-10-CM

## 2021-06-04 PROCEDURE — 77065 DX MAMMO INCL CAD UNI: CPT

## 2021-06-04 PROCEDURE — 77065 DX MAMMO INCL CAD UNI: CPT | Mod: 26,LT

## 2021-06-04 PROCEDURE — G0279: CPT | Mod: 26

## 2021-06-04 PROCEDURE — G0279: CPT

## 2021-09-20 ENCOUNTER — APPOINTMENT (OUTPATIENT)
Dept: INTERNAL MEDICINE | Facility: CLINIC | Age: 56
End: 2021-09-20

## 2021-09-20 ENCOUNTER — OUTPATIENT (OUTPATIENT)
Dept: OUTPATIENT SERVICES | Facility: HOSPITAL | Age: 56
LOS: 1 days | End: 2021-09-20
Payer: SELF-PAY

## 2021-09-20 VITALS
SYSTOLIC BLOOD PRESSURE: 130 MMHG | HEART RATE: 79 BPM | HEIGHT: 64 IN | OXYGEN SATURATION: 97 % | BODY MASS INDEX: 32.1 KG/M2 | DIASTOLIC BLOOD PRESSURE: 90 MMHG | WEIGHT: 188 LBS

## 2021-09-20 DIAGNOSIS — N64.89 OTHER SPECIFIED DISORDERS OF BREAST: ICD-10-CM

## 2021-09-20 DIAGNOSIS — S52.531D COLLES' FRACTURE OF RIGHT RADIUS, SUBSEQUENT ENCOUNTER FOR CLOSED FRACTURE WITH ROUTINE HEALING: ICD-10-CM

## 2021-09-20 DIAGNOSIS — Z98.51 TUBAL LIGATION STATUS: Chronic | ICD-10-CM

## 2021-09-20 DIAGNOSIS — R92.2 INCONCLUSIVE MAMMOGRAM: ICD-10-CM

## 2021-09-20 DIAGNOSIS — Z90.710 ACQUIRED ABSENCE OF BOTH CERVIX AND UTERUS: Chronic | ICD-10-CM

## 2021-09-20 DIAGNOSIS — Z09 ENCOUNTER FOR FOLLOW-UP EXAMINATION AFTER COMPLETED TREATMENT FOR CONDITIONS OTHER THAN MALIGNANT NEOPLASM: ICD-10-CM

## 2021-09-20 DIAGNOSIS — R19.09 OTHER INTRA-ABDOMINAL AND PELVIC SWELLING, MASS AND LUMP: ICD-10-CM

## 2021-09-20 DIAGNOSIS — Z86.39 PERSONAL HISTORY OF OTHER ENDOCRINE, NUTRITIONAL AND METABOLIC DISEASE: ICD-10-CM

## 2021-09-20 DIAGNOSIS — S52.531A COLLES' FRACTURE OF RIGHT RADIUS, INITIAL ENCOUNTER FOR CLOSED FRACTURE: ICD-10-CM

## 2021-09-20 DIAGNOSIS — M25.511 PAIN IN RIGHT SHOULDER: ICD-10-CM

## 2021-09-20 DIAGNOSIS — Z98.890 OTHER SPECIFIED POSTPROCEDURAL STATES: ICD-10-CM

## 2021-09-20 DIAGNOSIS — Z87.81 PERSONAL HISTORY OF (HEALED) TRAUMATIC FRACTURE: ICD-10-CM

## 2021-09-20 DIAGNOSIS — R14.0 ABDOMINAL DISTENSION (GASEOUS): ICD-10-CM

## 2021-09-20 DIAGNOSIS — Z23 ENCOUNTER FOR IMMUNIZATION: ICD-10-CM

## 2021-09-20 DIAGNOSIS — Z86.2 PERSONAL HISTORY OF DISEASES OF THE BLOOD AND BLOOD-FORMING ORGANS AND CERTAIN DISORDERS INVOLVING THE IMMUNE MECHANISM: ICD-10-CM

## 2021-09-20 DIAGNOSIS — I10 ESSENTIAL (PRIMARY) HYPERTENSION: ICD-10-CM

## 2021-09-20 DIAGNOSIS — Z87.898 PERSONAL HISTORY OF OTHER SPECIFIED CONDITIONS: ICD-10-CM

## 2021-09-20 DIAGNOSIS — R79.89 OTHER SPECIFIED ABNORMAL FINDINGS OF BLOOD CHEMISTRY: ICD-10-CM

## 2021-09-20 DIAGNOSIS — R07.89 OTHER CHEST PAIN: ICD-10-CM

## 2021-09-20 DIAGNOSIS — N63.0 UNSPECIFIED LUMP IN UNSPECIFIED BREAST: ICD-10-CM

## 2021-09-20 DIAGNOSIS — Z98.89 OTHER SPECIFIED POSTPROCEDURAL STATES: Chronic | ICD-10-CM

## 2021-09-20 DIAGNOSIS — R73.09 OTHER ABNORMAL GLUCOSE: ICD-10-CM

## 2021-09-20 PROCEDURE — G0463: CPT | Mod: 25

## 2021-09-20 PROCEDURE — G0008: CPT

## 2021-09-20 PROCEDURE — 90688 IIV4 VACCINE SPLT 0.5 ML IM: CPT

## 2021-09-20 NOTE — ASSESSMENT
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

## 2021-09-20 NOTE — REVIEW OF SYSTEMS
[Abdominal Pain] : abdominal pain [Back Pain] : back pain [Recent Change In Weight] : ~T no recent weight change [Fever] : no fever [Chest Pain] : no chest pain [Palpitations] : no palpitations [Lower Ext Edema] : no lower extremity edema [Shortness Of Breath] : no shortness of breath [Wheezing] : no wheezing [Cough] : no cough [Nausea] : no nausea [Constipation] : no constipation [Diarrhea] : diarrhea [Vomiting] : no vomiting

## 2021-09-20 NOTE — HISTORY OF PRESENT ILLNESS
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 The pain started 3 weeks ago, last week was the worse, however this week not having as much pain. It was previously described as 10/10, a "strong sensation," with hyperactive bowel sounds. Today pt states she feels better, she has been drinking hot tea, and it relieved her symptoms partially. No nausea, vomiting, constipation, or diarrhea. No changes in weight.  No fevers, chills, SOB, or chest pain. Pt also previously had lower back pain, however it appears it has somewhat resolved.

## 2021-09-20 NOTE — PLAN
[FreeTextEntry1] : \par # Dyspepsia\par Abdominal discomfort, bloating noticed on exam\par Symptoms resolving with hot tea, not taking any medications\par No alarming sxs: change in weight, bowel habits\par Last colonoscopy in 2018 (diverticulosis)\par Given FODMAP paper with examples of which foods to eat to help avoid similar events from occurring\par \par Received flu shot on this visit.\par \par F/u in telemedicine in 2 weeks to see if sxs have been resolving\par \par Case discussed with Dr. Griffin\par \par Tosha Combs MD, PGY2\par Internal Medicine\par

## 2021-09-20 NOTE — PHYSICAL EXAM
[No Acute Distress] : no acute distress [Well Developed] : well developed [Well Nourished] : well nourished [Well-Appearing] : well-appearing [No Respiratory Distress] : no respiratory distress  [No Accessory Muscle Use] : no accessory muscle use [Clear to Auscultation] : lungs were clear to auscultation bilaterally [Normal Rate] : normal rate  [Regular Rhythm] : with a regular rhythm [Normal S1, S2] : normal S1 and S2 [Soft] : abdomen soft [No Murmur] : no murmur heard [Non-distended] : non-distended [Normal Bowel Sounds] : normal bowel sounds [de-identified] : Mild tenderness to palpation in epigastrium

## 2021-09-21 DIAGNOSIS — R10.13 EPIGASTRIC PAIN: ICD-10-CM

## 2021-09-21 DIAGNOSIS — Z23 ENCOUNTER FOR IMMUNIZATION: ICD-10-CM

## 2021-09-22 ENCOUNTER — APPOINTMENT (OUTPATIENT)
Dept: INTERNAL MEDICINE | Facility: CLINIC | Age: 56
End: 2021-09-22

## 2021-10-04 ENCOUNTER — OUTPATIENT (OUTPATIENT)
Dept: OUTPATIENT SERVICES | Facility: HOSPITAL | Age: 56
LOS: 1 days | End: 2021-10-04
Payer: SELF-PAY

## 2021-10-04 ENCOUNTER — APPOINTMENT (OUTPATIENT)
Dept: INTERNAL MEDICINE | Facility: CLINIC | Age: 56
End: 2021-10-04

## 2021-10-04 DIAGNOSIS — Z90.710 ACQUIRED ABSENCE OF BOTH CERVIX AND UTERUS: Chronic | ICD-10-CM

## 2021-10-04 DIAGNOSIS — I10 ESSENTIAL (PRIMARY) HYPERTENSION: ICD-10-CM

## 2021-10-04 DIAGNOSIS — Z98.89 OTHER SPECIFIED POSTPROCEDURAL STATES: Chronic | ICD-10-CM

## 2021-10-04 DIAGNOSIS — Z98.51 TUBAL LIGATION STATUS: Chronic | ICD-10-CM

## 2021-10-04 PROCEDURE — G0463: CPT

## 2021-10-04 PROCEDURE — T1013: CPT

## 2021-10-06 NOTE — ASSESSMENT
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 Patient also has low ASCVD risk so less concern for cardiogenic etiology. Last visit with cardiology was back in April/2021 with no concerns at that time.\par - Will prescribe ketorolac cream to see if there are any improvements.\par - Informed patient of low threshold to pursue help at ED given that pain may represent atypical presentation of cardiogenic issue\par - Patient already scheduled for follow up in person at [facility] in 2 weeks.

## 2021-10-06 NOTE — HISTORY OF PRESENT ILLNESS
[Home] : at home, [unfilled] , at the time of the visit. [Medical Office: ([facility])___] : at the medical office located in  [[employer] ] : provided by [employer]   [Time Spent: ____ minutes] : Total time spent using  services: [unfilled] minutes. The patient's primary language is not English thus required  services. 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 Patient has implemented dietary changes from last visit as well as omeprazole but says that there has been no improvement in the pain. Patient says pain is a heavy pain that can become so "fatal" that it causes SOB. She says the pain is in the "top part of her stomach" and she has had 3 episodes over the last two weeks which have lasted from 15-60 minutes. The pain does not worsen with exertion and comes on randomly but pain is reproducible. Patient also says heat pads have helped her to control the pain.

## 2021-10-06 NOTE — REVIEW OF SYSTEMS
[Anxiety] : anxiety [Chest Pain] : no chest pain [Lower Ext Edema] : no lower extremity edema [Dyspnea on Exertion] : no dyspnea on exertion [Nausea] : no nausea [Constipation] : no constipation [Diarrhea] : diarrhea [Vomiting] : no vomiting [Dizziness] : no dizziness [Fainting] : no fainting

## 2021-10-07 DIAGNOSIS — M94.0 CHONDROCOSTAL JUNCTION SYNDROME [TIETZE]: ICD-10-CM

## 2021-10-07 DIAGNOSIS — M79.18 MYALGIA, OTHER SITE: ICD-10-CM

## 2021-10-18 ENCOUNTER — APPOINTMENT (OUTPATIENT)
Dept: INTERNAL MEDICINE | Facility: CLINIC | Age: 56
End: 2021-10-18

## 2021-10-18 ENCOUNTER — OUTPATIENT (OUTPATIENT)
Dept: OUTPATIENT SERVICES | Facility: HOSPITAL | Age: 56
LOS: 1 days | End: 2021-10-18
Payer: SELF-PAY

## 2021-10-18 VITALS
SYSTOLIC BLOOD PRESSURE: 136 MMHG | HEART RATE: 84 BPM | BODY MASS INDEX: 31.92 KG/M2 | HEIGHT: 64 IN | OXYGEN SATURATION: 97 % | DIASTOLIC BLOOD PRESSURE: 76 MMHG | WEIGHT: 187 LBS

## 2021-10-18 VITALS — TEMPERATURE: 98.6 F

## 2021-10-18 DIAGNOSIS — Z90.710 ACQUIRED ABSENCE OF BOTH CERVIX AND UTERUS: Chronic | ICD-10-CM

## 2021-10-18 DIAGNOSIS — Z86.018 PERSONAL HISTORY OF OTHER BENIGN NEOPLASM: ICD-10-CM

## 2021-10-18 DIAGNOSIS — Z98.89 OTHER SPECIFIED POSTPROCEDURAL STATES: Chronic | ICD-10-CM

## 2021-10-18 DIAGNOSIS — I10 ESSENTIAL (PRIMARY) HYPERTENSION: ICD-10-CM

## 2021-10-18 DIAGNOSIS — Z98.51 TUBAL LIGATION STATUS: Chronic | ICD-10-CM

## 2021-10-18 PROCEDURE — G0463: CPT

## 2021-10-25 NOTE — ASSESSMENT
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

## 2021-10-25 NOTE — HISTORY OF PRESENT ILLNESS
[[employer] ] : provided by [employer]   [FreeTextEntry1] : Pt presenting for a f/u appt. 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 Pt reports improvement in her pain at this visit, reports pain only twice in the last 2 weeks. Pt reports taking nexium as needed when she experiences her dyspepsia symptoms. Pain has been controlled w/ diet and as needed nexium. Otherwise, pt denies other symptoms, denies fevers, chills, chest p/p, sob, n/v/d, sick contacts, recent travel.

## 2021-10-25 NOTE — REVIEW OF SYSTEMS
[Fever] : no fever [Chills] : no chills [Night Sweats] : no night sweats [Recent Change In Weight] : ~T no recent weight change [Discharge] : no discharge [Pain] : no pain [Vision Problems] : no vision problems [Itching] : no itching [Earache] : no earache [Hearing Loss] : no hearing loss [Nasal Discharge] : no nasal discharge [Sore Throat] : no sore throat [Chest Pain] : no chest pain [Palpitations] : no palpitations [Orthopnea] : no orthopnea [Paroxysmal Nocturnal Dyspnea] : no paroxysmal nocturnal dyspnea [Shortness Of Breath] : no shortness of breath [Wheezing] : no wheezing [Cough] : no cough [Abdominal Pain] : no abdominal pain [Nausea] : no nausea [Vomiting] : no vomiting [Heartburn] : no heartburn [Dysuria] : no dysuria [Incontinence] : no incontinence [Hematuria] : no hematuria [Frequency] : no frequency [Joint Pain] : no joint pain [Joint Stiffness] : no joint stiffness [Muscle Pain] : no muscle pain [Back Pain] : no back pain [Itching] : no itching [Mole Changes] : no mole changes [Nail Changes] : no nail changes [Skin Rash] : no skin rash [Headache] : no headache [Dizziness] : no dizziness [Memory Loss] : no memory loss [Unsteady Walking] : no ataxia [Suicidal] : not suicidal [Insomnia] : no insomnia [Anxiety] : no anxiety [Easy Bleeding] : no easy bleeding [Easy Bruising] : no easy bruising [Swollen Glands] : no swollen glands

## 2021-10-26 DIAGNOSIS — Z86.018 PERSONAL HISTORY OF OTHER BENIGN NEOPLASM: ICD-10-CM

## 2021-10-26 DIAGNOSIS — K21.9 GASTRO-ESOPHAGEAL REFLUX DISEASE WITHOUT ESOPHAGITIS: ICD-10-CM

## 2021-10-26 DIAGNOSIS — Z87.19 PERSONAL HISTORY OF OTHER DISEASES OF THE DIGESTIVE SYSTEM: ICD-10-CM

## 2021-12-10 ENCOUNTER — APPOINTMENT (OUTPATIENT)
Dept: MAMMOGRAPHY | Facility: IMAGING CENTER | Age: 56
End: 2021-12-10

## 2021-12-10 ENCOUNTER — OUTPATIENT (OUTPATIENT)
Dept: OUTPATIENT SERVICES | Facility: HOSPITAL | Age: 56
LOS: 1 days | End: 2021-12-10

## 2021-12-10 DIAGNOSIS — Z00.8 ENCOUNTER FOR OTHER GENERAL EXAMINATION: ICD-10-CM

## 2021-12-10 DIAGNOSIS — Z90.710 ACQUIRED ABSENCE OF BOTH CERVIX AND UTERUS: Chronic | ICD-10-CM

## 2021-12-10 DIAGNOSIS — Z98.89 OTHER SPECIFIED POSTPROCEDURAL STATES: Chronic | ICD-10-CM

## 2021-12-10 DIAGNOSIS — Z98.51 TUBAL LIGATION STATUS: Chronic | ICD-10-CM

## 2022-02-01 ENCOUNTER — NON-APPOINTMENT (OUTPATIENT)
Age: 57
End: 2022-02-01

## 2022-02-02 ENCOUNTER — OUTPATIENT (OUTPATIENT)
Dept: OUTPATIENT SERVICES | Facility: HOSPITAL | Age: 57
LOS: 1 days | End: 2022-02-02
Payer: COMMERCIAL

## 2022-02-02 ENCOUNTER — APPOINTMENT (OUTPATIENT)
Dept: INTERNAL MEDICINE | Facility: CLINIC | Age: 57
End: 2022-02-02
Payer: COMMERCIAL

## 2022-02-02 ENCOUNTER — RESULT REVIEW (OUTPATIENT)
Age: 57
End: 2022-02-02

## 2022-02-02 ENCOUNTER — APPOINTMENT (OUTPATIENT)
Dept: ULTRASOUND IMAGING | Facility: CLINIC | Age: 57
End: 2022-02-02
Payer: COMMERCIAL

## 2022-02-02 ENCOUNTER — LABORATORY RESULT (OUTPATIENT)
Age: 57
End: 2022-02-02

## 2022-02-02 ENCOUNTER — OUTPATIENT (OUTPATIENT)
Dept: OUTPATIENT SERVICES | Facility: HOSPITAL | Age: 57
LOS: 1 days | End: 2022-02-02
Payer: SELF-PAY

## 2022-02-02 VITALS
OXYGEN SATURATION: 98 % | DIASTOLIC BLOOD PRESSURE: 80 MMHG | WEIGHT: 190 LBS | BODY MASS INDEX: 32.44 KG/M2 | SYSTOLIC BLOOD PRESSURE: 122 MMHG | HEIGHT: 64 IN | HEART RATE: 105 BPM

## 2022-02-02 DIAGNOSIS — Z98.51 TUBAL LIGATION STATUS: Chronic | ICD-10-CM

## 2022-02-02 DIAGNOSIS — Z90.710 ACQUIRED ABSENCE OF BOTH CERVIX AND UTERUS: Chronic | ICD-10-CM

## 2022-02-02 DIAGNOSIS — Z98.89 OTHER SPECIFIED POSTPROCEDURAL STATES: Chronic | ICD-10-CM

## 2022-02-02 DIAGNOSIS — I10 ESSENTIAL (PRIMARY) HYPERTENSION: ICD-10-CM

## 2022-02-02 DIAGNOSIS — C73 MALIGNANT NEOPLASM OF THYROID GLAND: ICD-10-CM

## 2022-02-02 PROCEDURE — G0463: CPT

## 2022-02-02 PROCEDURE — 76536 US EXAM OF HEAD AND NECK: CPT | Mod: 26

## 2022-02-02 PROCEDURE — ZZZZZ: CPT

## 2022-02-02 PROCEDURE — 76536 US EXAM OF HEAD AND NECK: CPT

## 2022-02-05 NOTE — ASSESSMENT
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

## 2022-02-05 NOTE — PLAN
[FreeTextEntry1] : #?post surgical keloid\par US thyroid, TSH with reflex\par ENT referral for follow up \par change to dove unscented\par trial of cetirizine\par hydrocortisone cream OTC\par if no improvement derm referral\par \par #HCM\par patient is covid vaccinated\par \par Jorge Castillo PGY3

## 2022-02-05 NOTE — PHYSICAL EXAM
[No Acute Distress] : no acute distress [Well Nourished] : well nourished [Well Developed] : well developed [Well-Appearing] : well-appearing [Normal Sclera/Conjunctiva] : normal sclera/conjunctiva [PERRL] : pupils equal round and reactive to light [EOMI] : extraocular movements intact [Normal Outer Ear/Nose] : the outer ears and nose were normal in appearance [Normal Oropharynx] : the oropharynx was normal [Normal TMs] : both tympanic membranes were normal [Normal Nasal Mucosa] : the nasal mucosa was normal [No JVD] : no jugular venous distention [No Lymphadenopathy] : no lymphadenopathy [Supple] : supple [Thyroid Normal, No Nodules] : the thyroid was normal and there were no nodules present [No Respiratory Distress] : no respiratory distress  [No Accessory Muscle Use] : no accessory muscle use [Clear to Auscultation] : lungs were clear to auscultation bilaterally [Normal Rate] : normal rate  [Regular Rhythm] : with a regular rhythm [Normal S1, S2] : normal S1 and S2 [No Murmur] : no murmur heard [No Carotid Bruits] : no carotid bruits [No Abdominal Bruit] : a ~M bruit was not heard ~T in the abdomen [No Varicosities] : no varicosities [Pedal Pulses Present] : the pedal pulses are present [No Edema] : there was no peripheral edema [No Palpable Aorta] : no palpable aorta [No Extremity Clubbing/Cyanosis] : no extremity clubbing/cyanosis [Soft] : abdomen soft [Non Tender] : non-tender [Non-distended] : non-distended [No Masses] : no abdominal mass palpated [No HSM] : no HSM [Normal Bowel Sounds] : normal bowel sounds [Normal Posterior Cervical Nodes] : no posterior cervical lymphadenopathy [Normal Anterior Cervical Nodes] : no anterior cervical lymphadenopathy [No CVA Tenderness] : no CVA  tenderness [No Spinal Tenderness] : no spinal tenderness [No Joint Swelling] : no joint swelling [Grossly Normal Strength/Tone] : grossly normal strength/tone [No Rash] : no rash [Coordination Grossly Intact] : coordination grossly intact [No Focal Deficits] : no focal deficits [Normal Gait] : normal gait [Deep Tendon Reflexes (DTR)] : deep tendon reflexes were 2+ and symmetric [Normal Affect] : the affect was normal [Normal Insight/Judgement] : insight and judgment were intact [de-identified] : skin with post surgical ?keloid, red raised, no purulence, no fluctance, not TTP, no hoarseness or stridor [de-identified] : has neck lesion, ORIF of previous right arm without keloid

## 2022-02-05 NOTE — END OF VISIT
[] : Resident [FreeTextEntry3] : Inflamed keloid scar acutely with recent onset of stress in family as indicated, along with use of scented products. \par Skincare reviewed\par Stop scented products and begin daily moisturizers like Eucerin cream and direct application of OTC hydrocotisone cream bid x 1 week, with derm f/u-possible intralesional injection if worsens\par Rest as outlined.

## 2022-02-05 NOTE — HISTORY OF PRESENT ILLNESS
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 although it is itchy patient denies itching\par patient also undergoing much stress given recent deaths in family\par denies fevers, chills, bodyaches, constipation, diarrhea, nausea, vomiting, diarrhea, abdominal pain, leg swelling \par  [Interpreters_IDNumber] : [id number]

## 2022-02-09 DIAGNOSIS — C73 MALIGNANT NEOPLASM OF THYROID GLAND: ICD-10-CM

## 2022-02-09 DIAGNOSIS — L91.0 HYPERTROPHIC SCAR: ICD-10-CM

## 2022-03-09 ENCOUNTER — APPOINTMENT (OUTPATIENT)
Dept: ULTRASOUND IMAGING | Facility: IMAGING CENTER | Age: 57
End: 2022-03-09
Payer: COMMERCIAL

## 2022-03-09 ENCOUNTER — APPOINTMENT (OUTPATIENT)
Dept: MAMMOGRAPHY | Facility: IMAGING CENTER | Age: 57
End: 2022-03-09
Payer: COMMERCIAL

## 2022-03-09 ENCOUNTER — RESULT REVIEW (OUTPATIENT)
Age: 57
End: 2022-03-09

## 2022-03-09 ENCOUNTER — OUTPATIENT (OUTPATIENT)
Dept: OUTPATIENT SERVICES | Facility: HOSPITAL | Age: 57
LOS: 1 days | End: 2022-03-09
Payer: SELF-PAY

## 2022-03-09 DIAGNOSIS — Z90.710 ACQUIRED ABSENCE OF BOTH CERVIX AND UTERUS: Chronic | ICD-10-CM

## 2022-03-09 DIAGNOSIS — Z98.89 OTHER SPECIFIED POSTPROCEDURAL STATES: Chronic | ICD-10-CM

## 2022-03-09 DIAGNOSIS — Z98.51 TUBAL LIGATION STATUS: Chronic | ICD-10-CM

## 2022-03-09 DIAGNOSIS — Z00.8 ENCOUNTER FOR OTHER GENERAL EXAMINATION: ICD-10-CM

## 2022-03-09 PROCEDURE — 77066 DX MAMMO INCL CAD BI: CPT | Mod: 26

## 2022-03-09 PROCEDURE — G0279: CPT | Mod: 26

## 2022-03-09 PROCEDURE — G0279: CPT

## 2022-03-09 PROCEDURE — 77066 DX MAMMO INCL CAD BI: CPT

## 2022-06-26 NOTE — PROGRESS NOTE ADULT - PROBLEM SELECTOR PLAN 2
[facility] ED Handoff Report    ED Chief Complaint: generalized weakness    Patient arrives by EMS from assisted living.  Per EMS report, patient's family was concerned with his sudden weakness.  Patient is normally independent with a walker and was requiring a assist of two to get out of the chair.  Patient also had sudden onset of intermittent confusion.  Patient has a suprapubic catheter that was changed last Thursday (per patient report).  Erythema around catheter site and rhys, cloudy urine.  BP enroute was 180's but patient hasn't taken his AM medications today, per EMS report.  On eliquis for Afib.  O2 sat 90% RA, placed patient on 2L O2 via NC.       Triage Assessment     Row Name 06/26/22 1152       Triage Assessment (Adult)    Airway WDL WDL       Respiratory WDL    Respiratory WDL WDL       Skin Circulation/Temperature WDL    Skin Circulation/Temperature WDL X  pallor       Cardiac WDL    Cardiac WDL X       Peripheral/Neurovascular WDL    Peripheral Neurovascular WDL WDL       Cognitive/Neuro/Behavioral WDL    Cognitive/Neuro/Behavioral WDL X    Level of Consciousness confused;lethargic  intermittent confusion with orientation questions    Arousal Level opens eyes spontaneously;arouses to voice    Orientation disoriented to;time;situation    Mood/Behavior flat affect;calm                ED Diagnosis:  (M62.81) Generalized muscle weakness  Comment: likely related to UTI  Plan: see UTI    (I48.20) Chronic atrial fibrillation (H)  Comment: rate controlled  Plan:     (Z79.01) Anticoagulated by anticoagulation treatment  Comment:   Plan:     (Z93.59) Suprapubic catheter (H)  Comment: changed last Thursday (per patient report) cloudy, rhys urine.  Patient currently has a leg bag  Plan:     (N30.00) Acute cystitis without hematuria  Comment:   Plan: Antibiotics    (J18.9) Pneumonia of both lungs due to infectious organism, unspecified part of lung  Comment:   Plan: antibiotics       PMH:  History 
"reviewed. No pertinent past medical history.     Code Status:  No CPR- Do NOT Intubate     Falls Risk: Yes Band: Applied    Current Living Situation/Residence: lives with a significant other and lives in an assisted living facility     Elimination Status: Continent: Yes     Activity Level: 2 assist    Patients Preferred Language:  English     Needed: No    Vital Signs:  BP (!) 157/78   Pulse 65   Temp 99.1  F (37.3  C) (Oral)   Resp 24   Ht 1.651 m (5' 5\")   Wt 61.2 kg (135 lb)   SpO2 98%   BMI 22.47 kg/m       Cardiac Rhythm: Afib with BBB, rate controlled    Pain Score: 0/10    Is the Patient Confused:  Yes, intermittently    Last Food or Drink: 06/26/22 at now    Focused Assessment:  Patient is intermittently confused.  He is more alert than he was when he first arrived by ambulance.  He has a suprapubic catheter that is reddened with drainage on his lower abdomen.  Urine is rhys and cloudy.  VSS.  He does not report any pain, SOB or nausea at this time.  Patient reported diarrhea prior to arriving to the hospital today.    Tests Performed: Done: Labs and Imaging    Treatments Provided:  Antibiotics, fluids, see MAR    Family Dynamics/Concerns: No    Family Updated On Visitor Policy: Yes    Plan of Care Communicated to Family: Yes    Who Was Updated about Plan of Care: Patient and son    Belongings Checklist Done and Signed by Patient: No    Medications sent with patient: None    Covid: symptomatic, pending    Additional Information: Patient comes from an assisted living where he lives with his wife who also has memory and mobility impairment.    RN: Melissa Amin RN   6/26/2022 4:27 PM         "
Low cholesterol diet

## 2022-07-07 NOTE — PRE-ANESTHESIA EVALUATION ADULT - NSANTHPROCED_GEN_ALL_CORE
Surgery date 11/8/22    Confirmed surgery at [facility] with     Laterality confirmed: Left Knee  PCP - Dr. Ellington   ASA - ok  NPO - ok  Weight loss meds - no  Ride after - ok   Insurance - aetna medicare  1st Post Op - ok    Case created yes  Service to Family  Scheduled in [place]   Letter to Patient yes    Pre-surgical COVID test - ok   COVID lab order - message sent to rn     [facility]

## 2022-08-15 ENCOUNTER — OUTPATIENT (OUTPATIENT)
Dept: OUTPATIENT SERVICES | Facility: HOSPITAL | Age: 57
LOS: 1 days | End: 2022-08-15
Payer: SELF-PAY

## 2022-08-15 ENCOUNTER — APPOINTMENT (OUTPATIENT)
Dept: INTERNAL MEDICINE | Facility: CLINIC | Age: 57
End: 2022-08-15

## 2022-08-15 VITALS
WEIGHT: 179 LBS | SYSTOLIC BLOOD PRESSURE: 130 MMHG | DIASTOLIC BLOOD PRESSURE: 80 MMHG | HEIGHT: 64 IN | BODY MASS INDEX: 30.56 KG/M2 | HEART RATE: 79 BPM | OXYGEN SATURATION: 99 %

## 2022-08-15 DIAGNOSIS — I10 ESSENTIAL (PRIMARY) HYPERTENSION: ICD-10-CM

## 2022-08-15 DIAGNOSIS — Z98.89 OTHER SPECIFIED POSTPROCEDURAL STATES: Chronic | ICD-10-CM

## 2022-08-15 DIAGNOSIS — Z90.710 ACQUIRED ABSENCE OF BOTH CERVIX AND UTERUS: Chronic | ICD-10-CM

## 2022-08-15 DIAGNOSIS — Z98.51 TUBAL LIGATION STATUS: Chronic | ICD-10-CM

## 2022-08-15 PROCEDURE — 90715 TDAP VACCINE 7 YRS/> IM: CPT

## 2022-08-15 PROCEDURE — 90471 IMMUNIZATION ADMIN: CPT

## 2022-08-15 PROCEDURE — 85025 COMPLETE CBC W/AUTO DIFF WBC: CPT

## 2022-08-15 PROCEDURE — 90750 HZV VACC RECOMBINANT IM: CPT

## 2022-08-15 PROCEDURE — 83036 HEMOGLOBIN GLYCOSYLATED A1C: CPT

## 2022-08-15 PROCEDURE — 80053 COMPREHEN METABOLIC PANEL: CPT

## 2022-08-15 PROCEDURE — 80061 LIPID PANEL: CPT

## 2022-08-15 PROCEDURE — ZZZZZ: CPT | Mod: GC

## 2022-08-15 PROCEDURE — 84443 ASSAY THYROID STIM HORMONE: CPT

## 2022-08-15 PROCEDURE — G0463: CPT | Mod: 25

## 2022-08-16 LAB
ALBUMIN SERPL ELPH-MCNC: 4.8 G/DL
ALP BLD-CCNC: 125 U/L
ALT SERPL-CCNC: 35 U/L
ANION GAP SERPL CALC-SCNC: 13 MMOL/L
AST SERPL-CCNC: 21 U/L
BASOPHILS # BLD AUTO: 0.05 K/UL
BASOPHILS NFR BLD AUTO: 1 %
BILIRUB SERPL-MCNC: 0.3 MG/DL
BUN SERPL-MCNC: 12 MG/DL
CALCIUM SERPL-MCNC: 10 MG/DL
CHLORIDE SERPL-SCNC: 100 MMOL/L
CHOLEST SERPL-MCNC: 271 MG/DL
CO2 SERPL-SCNC: 27 MMOL/L
CREAT SERPL-MCNC: 0.64 MG/DL
EGFR: 105 ML/MIN/1.73M2
EOSINOPHIL # BLD AUTO: 0.06 K/UL
EOSINOPHIL NFR BLD AUTO: 1.3 %
ESTIMATED AVERAGE GLUCOSE: 123 MG/DL
GLUCOSE SERPL-MCNC: 92 MG/DL
HBA1C MFR BLD HPLC: 5.9 %
HCT VFR BLD CALC: 46 %
HDLC SERPL-MCNC: 57 MG/DL
HGB BLD-MCNC: 15 G/DL
IMM GRANULOCYTES NFR BLD AUTO: 0.2 %
LDLC SERPL CALC-MCNC: 189 MG/DL
LYMPHOCYTES # BLD AUTO: 1.93 K/UL
LYMPHOCYTES NFR BLD AUTO: 40.2 %
MAN DIFF?: NORMAL
MCHC RBC-ENTMCNC: 29 PG
MCHC RBC-ENTMCNC: 32.6 GM/DL
MCV RBC AUTO: 89 FL
MONOCYTES # BLD AUTO: 0.34 K/UL
MONOCYTES NFR BLD AUTO: 7.1 %
NEUTROPHILS # BLD AUTO: 2.41 K/UL
NEUTROPHILS NFR BLD AUTO: 50.2 %
NONHDLC SERPL-MCNC: 213 MG/DL
PLATELET # BLD AUTO: 252 K/UL
POTASSIUM SERPL-SCNC: 3.8 MMOL/L
PROT SERPL-MCNC: 7.9 G/DL
RBC # BLD: 5.17 M/UL
RBC # FLD: 12.5 %
SODIUM SERPL-SCNC: 140 MMOL/L
TRIGL SERPL-MCNC: 119 MG/DL
TSH SERPL-ACNC: 3.05 UIU/ML
WBC # FLD AUTO: 4.8 K/UL

## 2022-08-19 NOTE — PLAN
[FreeTextEntry1] : #Upper back pain\par - Is likely posture related 2/2 kyphosis. Will give PT referral\par \par # Sore throat\par - Will monitor symptoms since improving. Can consider strep swab if worsening. Continue motrin and tylenol\par \par # Keloid Scar\par - Unresolved with hydrocortisone cream. Will give Derm referral\par \par # Hurthle cell adenoma with L thyroidectomy\par - Will give ENT referral for follow up.\par \par #HCM\par - Colon Cancer: Last colonoscopy 3/2019 with tubular adenoma. Due for next on 2023\par - Cervical Cancer: [facility] with cervix removed in 2016\par - Breast Cancer: Last mammogram 3/22. BIRADS 2. Repeat in 3/2023\par - Continue exercise modification\par - Lipid panel, CMC, CMP, TSH, A1C\par - TDAP and Zoster

## 2022-08-19 NOTE — REVIEW OF SYSTEMS
[Sore Throat] : sore throat [Muscle Pain] : muscle pain [Negative] : Gastrointestinal [Hoarseness] : no hoarseness

## 2022-08-19 NOTE — ASSESSMENT
[FreeTextEntry1] : 55 YO F here for annual exam with new complaints of upper back pain and throat pain.

## 2022-08-19 NOTE — PHYSICAL EXAM
[Normal] : soft, non-tender, non-distended, no masses palpated, no HSM and normal bowel sounds [de-identified] : Erythematous oropharynx with cobblestoning. Upper throat is tender to palpation. No lymphadenopathy. [de-identified] : Upper thoracic kyphosis with tenderness

## 2022-08-19 NOTE — HISTORY OF PRESENT ILLNESS
[FreeTextEntry1] : 57 YO F with history of L thyroid lobectomy, JALEESA, obesity, here for annual exam with new complaints of upper back pain and sore throat. 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 She has 2 new complaints:\par \par #Upper back pain\par - Has had it for 1-2 months. She has paraspinal tenderness to the upper back. Denies trauma or fall. Has not tried any creams.\par \par # Sore throat\par - Has had it for 2 weeks. The first week it was very painful, then it got better, and now it is further improved though still present. The pain occurs only when swallowing. When the pain was at its worst, she had trouble swallowing water. She denies fever/chills, chest pain/SOB, N/V, constipation/diarrhea. Has been taking tylenol and motrin with minimal relief.\par \par #Keloid Scar\par - At thyroid incision site. Tried the hydrocortisone cream TID for 1 month with no relief. Feels it might have even grown.\par \par #HCM\par - Colon Cancer: Last colonoscopy 3/2019 with tubular adenoma. Due for next on 2023\par - Cervical Cancer: [facility] with cervix removed in 2016\par - Breast Cancer: Last mammogram 3/22. BIRADS 2. Repeat in 3/2023\par - Has lost 11lbs in last 7 months. Intentional. Attributes it to walking more.\par \par Works as a .

## 2022-08-23 NOTE — ED ADULT TRIAGE NOTE - HEIGHT IN CM
63-year-old gentleman returning to the office today status post laparoscopic right inguinal hernia repair with umbilical hernia repair that was performed on 8/12/2022.  Overall he is doing very well since surgery with minimal discomfort currently.  His incisions are all healing well he was informed the glue will peel off over the course of the next several days.  He was cautioned to slowly return to his normal activities using his body as his guide.  He was asked not to lift greater than 20 pounds for the remainder of this week and then may increase this by 5 pounds each additional week until he is 6 weeks out.  All questions were answered and he was willing proceed with all recommendations.    Brayan Francois PA-C   160.02

## 2022-08-29 DIAGNOSIS — Z00.00 ENCOUNTER FOR GENERAL ADULT MEDICAL EXAMINATION WITHOUT ABNORMAL FINDINGS: ICD-10-CM

## 2022-08-29 DIAGNOSIS — Z23 ENCOUNTER FOR IMMUNIZATION: ICD-10-CM

## 2022-10-27 ENCOUNTER — APPOINTMENT (OUTPATIENT)
Dept: OTOLARYNGOLOGY | Facility: CLINIC | Age: 57
End: 2022-10-27

## 2023-02-03 ENCOUNTER — APPOINTMENT (OUTPATIENT)
Dept: DERMATOLOGY | Facility: CLINIC | Age: 58
End: 2023-02-03

## 2023-03-24 NOTE — ED PROVIDER NOTE - CROS ED NEURO NEG
Problem: Neurosensory - Adult  Goal: Achieves stable or improved neurological status  3/24/2023 0244 by Eladio Yoon, RN  Outcome: Progressing  Note: Pt neuro status currently unchanged. Pt Aox4. VSS     Problem: ABCDS Injury Assessment  Goal: Absence of physical injury  3/24/2023 0244 by Eladio Yoon, RN  Outcome: Progressing  Note: Standard safety precautions implemented.  Pt educated to use call light and wait for assistance no dizziness, no LOC/no headache

## 2023-05-01 ENCOUNTER — APPOINTMENT (OUTPATIENT)
Dept: INTERNAL MEDICINE | Facility: CLINIC | Age: 58
End: 2023-05-01
Payer: COMMERCIAL

## 2023-05-01 ENCOUNTER — RESULT CHARGE (OUTPATIENT)
Age: 58
End: 2023-05-01

## 2023-05-01 ENCOUNTER — OUTPATIENT (OUTPATIENT)
Dept: OUTPATIENT SERVICES | Facility: HOSPITAL | Age: 58
LOS: 1 days | End: 2023-05-01
Payer: SELF-PAY

## 2023-05-01 ENCOUNTER — RESULT REVIEW (OUTPATIENT)
Age: 58
End: 2023-05-01

## 2023-05-01 VITALS
SYSTOLIC BLOOD PRESSURE: 134 MMHG | OXYGEN SATURATION: 98 % | HEIGHT: 64 IN | HEART RATE: 105 BPM | DIASTOLIC BLOOD PRESSURE: 82 MMHG

## 2023-05-01 DIAGNOSIS — C73 MALIGNANT NEOPLASM OF THYROID GLAND: ICD-10-CM

## 2023-05-01 DIAGNOSIS — I10 ESSENTIAL (PRIMARY) HYPERTENSION: ICD-10-CM

## 2023-05-01 DIAGNOSIS — Z98.89 OTHER SPECIFIED POSTPROCEDURAL STATES: Chronic | ICD-10-CM

## 2023-05-01 DIAGNOSIS — Z90.710 ACQUIRED ABSENCE OF BOTH CERVIX AND UTERUS: Chronic | ICD-10-CM

## 2023-05-01 DIAGNOSIS — D17.1 BENIGN LIPOMATOUS NEOPLASM OF SKIN AND SUBCUTANEOUS TISSUE OF TRUNK: ICD-10-CM

## 2023-05-01 DIAGNOSIS — Z98.51 TUBAL LIGATION STATUS: Chronic | ICD-10-CM

## 2023-05-01 DIAGNOSIS — L91.0 HYPERTROPHIC SCAR: ICD-10-CM

## 2023-05-01 LAB — HBA1C MFR BLD HPLC: 5.8

## 2023-05-01 PROCEDURE — G0463: CPT

## 2023-05-01 PROCEDURE — 80061 LIPID PANEL: CPT

## 2023-05-01 PROCEDURE — 85027 COMPLETE CBC AUTOMATED: CPT

## 2023-05-01 PROCEDURE — 99213 OFFICE O/P EST LOW 20 MIN: CPT | Mod: GE

## 2023-05-01 PROCEDURE — 80053 COMPREHEN METABOLIC PANEL: CPT

## 2023-05-01 PROCEDURE — 84443 ASSAY THYROID STIM HORMONE: CPT

## 2023-05-01 RX ORDER — SENNOSIDES 8.6 MG TABLETS 8.6 MG/1
8.6 TABLET ORAL
Qty: 30 | Refills: 2 | Status: COMPLETED | COMMUNITY
Start: 2020-01-28 | End: 2023-05-01

## 2023-05-01 RX ORDER — MELOXICAM 15 MG/1
15 TABLET ORAL
Qty: 21 | Refills: 0 | Status: COMPLETED | COMMUNITY
Start: 2019-09-25 | End: 2023-05-01

## 2023-05-01 RX ORDER — HYDROCORTISONE 0.5 G/100G
0.5 CREAM TOPICAL TWICE DAILY
Qty: 1 | Refills: 0 | Status: COMPLETED | COMMUNITY
Start: 2022-02-02 | End: 2023-05-01

## 2023-05-01 RX ORDER — IBUPROFEN 600 MG/1
600 TABLET, FILM COATED ORAL 3 TIMES DAILY
Qty: 90 | Refills: 2 | Status: COMPLETED | COMMUNITY
Start: 2019-10-15 | End: 2023-05-01

## 2023-05-01 RX ORDER — CHLORHEXIDINE GLUCONATE 4 %
5 LIQUID (ML) TOPICAL
Qty: 30 | Refills: 0 | Status: COMPLETED | COMMUNITY
Start: 2022-02-02 | End: 2023-05-01

## 2023-05-01 RX ORDER — DICLOFENAC SODIUM 3 G/100G
3 GEL TOPICAL TWICE DAILY
Qty: 1 | Refills: 0 | Status: COMPLETED | COMMUNITY
Start: 2021-10-04 | End: 2023-05-01

## 2023-05-02 ENCOUNTER — NON-APPOINTMENT (OUTPATIENT)
Age: 58
End: 2023-05-02

## 2023-05-02 PROBLEM — C73 HURTHLE CELL CARCINOMA OF THYROID: Status: ACTIVE | Noted: 2018-03-20

## 2023-05-02 PROBLEM — D17.1 LIPOMA OF BACK: Status: ACTIVE | Noted: 2023-05-02

## 2023-05-02 PROBLEM — L91.0 KELOID: Status: ACTIVE | Noted: 2022-02-02

## 2023-05-02 LAB
ALBUMIN SERPL ELPH-MCNC: 4.6 G/DL
ALP BLD-CCNC: 105 U/L
ALT SERPL-CCNC: 28 U/L
ANION GAP SERPL CALC-SCNC: 15 MMOL/L
AST SERPL-CCNC: 22 U/L
BILIRUB SERPL-MCNC: 0.2 MG/DL
BUN SERPL-MCNC: 16 MG/DL
CALCIUM SERPL-MCNC: 9.9 MG/DL
CHLORIDE SERPL-SCNC: 104 MMOL/L
CHOLEST SERPL-MCNC: 234 MG/DL
CO2 SERPL-SCNC: 25 MMOL/L
CREAT SERPL-MCNC: 0.59 MG/DL
EGFR: 106 ML/MIN/1.73M2
GLUCOSE BLDC GLUCOMTR-MCNC: 104
GLUCOSE SERPL-MCNC: 94 MG/DL
HCT VFR BLD CALC: 44.4 %
HDLC SERPL-MCNC: 50 MG/DL
HGB BLD-MCNC: 14.2 G/DL
LDLC SERPL CALC-MCNC: 141 MG/DL
MCHC RBC-ENTMCNC: 29.4 PG
MCHC RBC-ENTMCNC: 32 GM/DL
MCV RBC AUTO: 91.9 FL
NONHDLC SERPL-MCNC: 183 MG/DL
PLATELET # BLD AUTO: 237 K/UL
POTASSIUM SERPL-SCNC: 3.7 MMOL/L
PROT SERPL-MCNC: 7.7 G/DL
RBC # BLD: 4.83 M/UL
RBC # FLD: 11.9 %
SODIUM SERPL-SCNC: 143 MMOL/L
TRIGL SERPL-MCNC: 211 MG/DL
TSH SERPL-ACNC: 2.71 UIU/ML
WBC # FLD AUTO: 6.6 K/UL

## 2023-05-02 NOTE — HISTORY OF PRESENT ILLNESS
[FreeTextEntry1] : f/u back pain, lipomas, prediabetes, grief [de-identified] : 58 yo F with history of L thyroid lobectomy, JALEESA, prediabetes, HLD, obesity, here for new complaint of two soft upper back masses and f/u of prediabetes risk. She has also been grieving her mother, who passed away 3 weeks ago from complications of diabetes.\par \par # Two soft upper back/neck masses \par - She first noticed the lumps about 1.5 months ago. One is located between her scapulae and is soft, mobile, not painful. The other is located on her posterior neck around C7 prominence, slightly harder, and painful to palpation. She has associated mild-moderate pain in her R neck along SCM with turning her head. Denies changes in hearing, vision, headaches, sensation, strength. No recent fevers, chills, wt loss, fatigue. She has never had similar lumps before. They have not been growing, becoming more rigid, or changing since they appeared.\par \par # Low back pain\par - She reports midline low back pain for 4-6 wks that is worse with activity. This has been a chronic intermittent issue. The pain is not radiating to upper back, abdomen, or legs. Denies any recent falls or injuries. She denies saddle anesthesia, incontinence to bowel/bladder, numbness or weakness in legs. She reports she has not tried PT in the past but has been walking more.\par \par # Prediabetes\par - Patient is concerned about her risk for diabetes since both parents passed from complications of DM. She believes cuts are not healing as fast on her hands. She also urinates often, including 3-4 times per day and 1 time per night. No recent changes in frequency of urination. Denies polyphagia or polydipsia.\par - She has been implementing healthy changes in her diet, including limiting juice, bread, rice, potatoes, fried foods. She walks 30-40 minutes every day. No recent weight loss or gain.\par \par # Grief reaction\par - Patient' mother passed away 3 weeks ago and father passed away 1 year ago, both from complications of diabetes. She had decreased PO intake for 1 week after she passed, but appetite has now returned to normal. She was initially isolating to her room, crying often, feeling depressed, sleeping poorly, having trouble concentrating. Now, she has joined a new [employer] and is speaking with a group of women. She still goes to sleep late between 3-4 AM due to ruminative thoughts, but declines any medications to assist with sleep. Sleep hygiene reviewed, pt will try to implement. Denies any suicidal or self-harm ideation, intent, or plan. She has been anxious about her risk of developing diabetes after seeing what her parents experienced.\par \par # Hurthle cell adenoma with L thyroidectomy \par - Patient does not like keloid at thyroid incision site and is interested in possible removal. She tried hydrocortisone cream TID for 1 month with limited improvement. No recent growth of scar, pain, or changes. Denies trouble swallowing. Denies weight loss/gain, cold or heat intolerance, homero/tachycardia, tremor, constipation/diarrhea.\par \par # HCM \par - Colon Cancer: Last colonoscopy 3/2019 with tubular adenoma, referral provided for 5 yr follow-up\par - Cervical Cancer: [facility] with cervix removed in 2016 \par - Breast Cancer: Last mammogram Mar 2022. BIRADS 2, referral provided for repeat\par - UTD on TDap (Aug 2022), reminded about flu vaccine next season

## 2023-05-02 NOTE — PHYSICAL EXAM
[No Acute Distress] : no acute distress [Well Nourished] : well nourished [Well Developed] : well developed [Well-Appearing] : well-appearing [Normal Sclera/Conjunctiva] : normal sclera/conjunctiva [PERRL] : pupils equal round and reactive to light [EOMI] : extraocular movements intact [Normal Outer Ear/Nose] : the outer ears and nose were normal in appearance [Normal Oropharynx] : the oropharynx was normal [No JVD] : no jugular venous distention [No Lymphadenopathy] : no lymphadenopathy [Supple] : supple [Thyroid Normal, No Nodules] : the thyroid was normal and there were no nodules present [No Respiratory Distress] : no respiratory distress  [No Accessory Muscle Use] : no accessory muscle use [Clear to Auscultation] : lungs were clear to auscultation bilaterally [Normal Rate] : normal rate  [Regular Rhythm] : with a regular rhythm [Normal S1, S2] : normal S1 and S2 [No Murmur] : no murmur heard [No Carotid Bruits] : no carotid bruits [No Abdominal Bruit] : a ~M bruit was not heard ~T in the abdomen [No Varicosities] : no varicosities [Pedal Pulses Present] : the pedal pulses are present [No Edema] : there was no peripheral edema [No Palpable Aorta] : no palpable aorta [No Extremity Clubbing/Cyanosis] : no extremity clubbing/cyanosis [Soft] : abdomen soft [Non Tender] : non-tender [Non-distended] : non-distended [No Masses] : no abdominal mass palpated [No HSM] : no HSM [Normal Bowel Sounds] : normal bowel sounds [Normal Posterior Cervical Nodes] : no posterior cervical lymphadenopathy [Normal Anterior Cervical Nodes] : no anterior cervical lymphadenopathy [No CVA Tenderness] : no CVA  tenderness [No Spinal Tenderness] : no spinal tenderness [No Joint Swelling] : no joint swelling [Grossly Normal Strength/Tone] : grossly normal strength/tone [No Rash] : no rash [Coordination Grossly Intact] : coordination grossly intact [No Focal Deficits] : no focal deficits [Normal Gait] : normal gait [Deep Tendon Reflexes (DTR)] : deep tendon reflexes were 2+ and symmetric [Normal Affect] : the affect was normal [Normal Insight/Judgement] : insight and judgment were intact [de-identified] : 2 cm rubbery, mobile, well circumscribed mass at C7 posterior neck, mildly TTP; lateral neck pain at SCM with rotation of neck towards L but no edema or erythema; no pain or swelling at mastoid [de-identified] : 2 cm rubbery, soft, mobile, well circumscribed mass in upper back at L paraspinal region, not TTP; midline lumbar paraspinal muscles TTP

## 2023-05-02 NOTE — ASSESSMENT
[FreeTextEntry1] : 58 yo F with history of L thyroid lobectomy, JALEESA, prediabetes, HLD, obesity, here for follow-up of thyroid adenoma. \par \par # Lipomas of upper back and neck x 1.5 months\par - Two 2 cm rubbery, mobile, well circumscribed masses (upper back + neck at C7)\par - Upper mass is TTP with pain radiating to R neck\par - Masses have not been growing or changing since they appeared\par - Plastic surgery referral provided as pt interested in removing lipomas\par - No recent fevers, chills, wt loss, fatigue, night sweats\par \par # Low back pain \par - Chronic intermittent issue\par - Patient declined PT referral, stretches reviewed instead\par - No alarm symptoms of saddle anesthesia, incontinence to bowel/bladder, numbness or weakness in legs; reviewed with patient\par - C/w OTC PRN tylenol and ibuprofen\par \par # Prediabetes, HLD\par - HbA1c 5.9 in Aug 2022, 5.8 in office today\par - Lipid panel ,  (H), HDL 57,  (H) in Aug 2022\par - Pt with 5-6 mo of healthy lifestyle changes including walking 30-40 min per day and improving diet\par - Pt anxious about diabetes risk as both her parents passed away from complications of diabetes\par - Diet, exercise, and weight loss reviewed\par - Check lipid panel\par \par # Grief reaction\par - Mother passed away 3 weeks ago\par - Pt with depressed mood, crying at times, insomnia, trouble concentrating\par - Still able to enjoy time with her family and to reflect on good memories with parents\par - Recently joined [employer] and attending women's support group, has people she can talk with\par - [facility]\par \par # Hurthle cell adenoma with L thyroidectomy \par - s/p left thyroid lobectomy (May 2018)  \par - TSH 3.05 in Aug 2022 \par - Thyroid US mildly heterogeneous R thyroid lobe, no suspicious lesions \par - Keloid at thyroid incision site, tried hydrocortisone cream TID for 1 month with limited improvement\par - Recheck TSH + T4\par - Plastic surgery referral provided for keloid evaluation per pt request; however explained keloid may return despite intervention\par \par # HCM \par - Colon Cancer: Last colonoscopy 3/2019 with tubular adenoma. Referral provided\par - Cervical Cancer: JALEESA with cervix removed in 2016 \par - Breast Cancer: Last mammogram Mar 2022. BIRADS 2. Referral provided\par - Check routine labs: CBC, CMP, TSH + T4, A1c, lipid panel \par - UTD on TDap (Aug 2022), reminded about flu vaccine next season \par - Due for Zoster 2nd dose, 1st dose in Aug 2022; deferred until CPE\par - RTC in 3-4 months for CPE \par \par Case discussed with Dr. Reagan

## 2023-05-02 NOTE — INTERPRETER SERVICES
[[employer] ] : provided by [employer]   [Interpreters_IDNumber] : [id number] [Interpreters_FullName] : Ron [TWNoteComboBox1] : Slovak

## 2023-05-03 DIAGNOSIS — R73.03 PREDIABETES: ICD-10-CM

## 2023-05-03 DIAGNOSIS — C73 MALIGNANT NEOPLASM OF THYROID GLAND: ICD-10-CM

## 2023-05-03 DIAGNOSIS — L91.0 HYPERTROPHIC SCAR: ICD-10-CM

## 2023-05-03 DIAGNOSIS — E78.5 HYPERLIPIDEMIA, UNSPECIFIED: ICD-10-CM

## 2023-05-03 DIAGNOSIS — D17.1 BENIGN LIPOMATOUS NEOPLASM OF SKIN AND SUBCUTANEOUS TISSUE OF TRUNK: ICD-10-CM

## 2023-05-03 DIAGNOSIS — K21.9 GASTRO-ESOPHAGEAL REFLUX DISEASE WITHOUT ESOPHAGITIS: ICD-10-CM

## 2023-05-03 DIAGNOSIS — M79.18 MYALGIA, OTHER SITE: ICD-10-CM

## 2023-05-22 ENCOUNTER — APPOINTMENT (OUTPATIENT)
Dept: MAMMOGRAPHY | Facility: IMAGING CENTER | Age: 58
End: 2023-05-22
Payer: COMMERCIAL

## 2023-05-22 ENCOUNTER — OUTPATIENT (OUTPATIENT)
Dept: OUTPATIENT SERVICES | Facility: HOSPITAL | Age: 58
LOS: 1 days | End: 2023-05-22
Payer: SELF-PAY

## 2023-05-22 ENCOUNTER — RESULT REVIEW (OUTPATIENT)
Age: 58
End: 2023-05-22

## 2023-05-22 DIAGNOSIS — Z00.00 ENCOUNTER FOR GENERAL ADULT MEDICAL EXAMINATION WITHOUT ABNORMAL FINDINGS: ICD-10-CM

## 2023-05-22 DIAGNOSIS — Z98.51 TUBAL LIGATION STATUS: Chronic | ICD-10-CM

## 2023-05-22 DIAGNOSIS — Z90.710 ACQUIRED ABSENCE OF BOTH CERVIX AND UTERUS: Chronic | ICD-10-CM

## 2023-05-22 DIAGNOSIS — Z98.89 OTHER SPECIFIED POSTPROCEDURAL STATES: Chronic | ICD-10-CM

## 2023-05-22 PROCEDURE — 77067 SCR MAMMO BI INCL CAD: CPT

## 2023-05-22 PROCEDURE — 77063 BREAST TOMOSYNTHESIS BI: CPT

## 2023-05-22 PROCEDURE — 77067 SCR MAMMO BI INCL CAD: CPT | Mod: 26

## 2023-05-22 PROCEDURE — 77063 BREAST TOMOSYNTHESIS BI: CPT | Mod: 26

## 2023-08-16 ENCOUNTER — OUTPATIENT (OUTPATIENT)
Dept: OUTPATIENT SERVICES | Facility: HOSPITAL | Age: 58
LOS: 1 days | End: 2023-08-16
Payer: SELF-PAY

## 2023-08-16 ENCOUNTER — APPOINTMENT (OUTPATIENT)
Dept: INTERNAL MEDICINE | Facility: CLINIC | Age: 58
End: 2023-08-16
Payer: COMMERCIAL

## 2023-08-16 VITALS
BODY MASS INDEX: 32.27 KG/M2 | WEIGHT: 189 LBS | SYSTOLIC BLOOD PRESSURE: 130 MMHG | DIASTOLIC BLOOD PRESSURE: 80 MMHG | HEIGHT: 64 IN | HEART RATE: 100 BPM | OXYGEN SATURATION: 98 %

## 2023-08-16 DIAGNOSIS — Z98.89 OTHER SPECIFIED POSTPROCEDURAL STATES: Chronic | ICD-10-CM

## 2023-08-16 DIAGNOSIS — I10 ESSENTIAL (PRIMARY) HYPERTENSION: ICD-10-CM

## 2023-08-16 DIAGNOSIS — Z98.51 TUBAL LIGATION STATUS: Chronic | ICD-10-CM

## 2023-08-16 DIAGNOSIS — Z90.710 ACQUIRED ABSENCE OF BOTH CERVIX AND UTERUS: Chronic | ICD-10-CM

## 2023-08-16 PROCEDURE — 36415 COLL VENOUS BLD VENIPUNCTURE: CPT

## 2023-08-16 PROCEDURE — 83036 HEMOGLOBIN GLYCOSYLATED A1C: CPT

## 2023-08-16 PROCEDURE — 90750 HZV VACC RECOMBINANT IM: CPT

## 2023-08-16 PROCEDURE — 99396 PREV VISIT EST AGE 40-64: CPT | Mod: GE,25

## 2023-08-16 PROCEDURE — 90471 IMMUNIZATION ADMIN: CPT

## 2023-08-16 RX ORDER — CETIRIZINE HYDROCHLORIDE 10 MG/1
10 TABLET, FILM COATED ORAL
Qty: 1 | Refills: 0 | Status: DISCONTINUED | COMMUNITY
Start: 2022-02-02 | End: 2023-08-16

## 2023-08-16 RX ORDER — LANOLIN ALCOHOL/MO/W.PET/CERES
CREAM (GRAM) TOPICAL
Qty: 1 | Refills: 0 | Status: DISCONTINUED | COMMUNITY
Start: 2022-02-02 | End: 2023-08-16

## 2023-08-18 ENCOUNTER — NON-APPOINTMENT (OUTPATIENT)
Age: 58
End: 2023-08-18

## 2023-08-18 LAB
ESTIMATED AVERAGE GLUCOSE: 114 MG/DL
HBA1C MFR BLD HPLC: 5.6 %

## 2023-08-21 NOTE — INTERPRETER SERVICES
[[employer] ] : provided by [employer]   [Time Spent: ____ minutes] : Total time spent using  services: [unfilled] minutes. The patient's primary language is not English thus required  services. [Interpreters_IDNumber] : [id number] [Interpreters_FullName] : Adriana [TWNoteComboBox1] : Grenadian

## 2023-08-21 NOTE — REVIEW OF SYSTEMS
[Vision Problems] : vision problems [Negative] : Constitutional [Chest Pain] : no chest pain [Palpitations] : no palpitations [Lower Ext Edema] : no lower extremity edema [Shortness Of Breath] : no shortness of breath [Abdominal Pain] : no abdominal pain [Nausea] : no nausea [Constipation] : no constipation [Diarrhea] : diarrhea [Vomiting] : no vomiting [Muscle Weakness] : no muscle weakness [Muscle Pain] : no muscle pain [Headache] : no headache [Dizziness] : no dizziness

## 2023-08-21 NOTE — HISTORY OF PRESENT ILLNESS
[FreeTextEntry1] : CPE. Neck pain [de-identified] : 57F PMH HLD, L thyroid lobectomy, JALEESA, prediabetes, obesity, presenting for CPE.  Patient c/o neck pain, does not know if from stress or high BP. Also wants to discuss her previous blood work. Since last set of labs, says she has worked hard to change her diet, started walking more, decreasing the size of her meals.  In terms of diet, eats cereal, granola, yogurt for breakfast. Has a large bowl of fruits, lots of broccoli, tomatoes, and mix of lentils. Makes own dressing with lemon and salt, sometimes adds vinegar. Loves salmon and all types of fish, cooks with light oil, grilled chicken with as much fat removed as possible. For exercise does 40 min in the morning, 25 min in the evening every day. Loves to dance.   States neck pain is sharp, b/l, comes and goes at random. Moving around helps. Does not take any medications but has tried to put her head down because she heard it helps improve blood flow. ROM preserved. Not associated with HA, weakness, however had one episode where her face felt "hard and frozen" and L arm felt numb and she had chest pressure for which she moved her arms, which helped.

## 2023-08-21 NOTE — PHYSICAL EXAM
[Normal Sclera/Conjunctiva] : normal sclera/conjunctiva [PERRL] : pupils equal round and reactive to light [No Lymphadenopathy] : no lymphadenopathy [Supple] : supple [Thyroid Normal, No Nodules] : the thyroid was normal and there were no nodules present [Pedal Pulses Present] : the pedal pulses are present [No Edema] : there was no peripheral edema [Normal] : affect was normal and insight and judgment were intact [de-identified] : Obese [de-identified] : s/p lobectomy

## 2023-08-21 NOTE — PLAN
[FreeTextEntry1] : 57F PMH HLD, L thyroid lobectomy, JALEESA, prediabetes, obesity, presenting for CPE. Also c/o neck pain.  #Obesity #HLD #prediabetes - encouraged continuing current changes to lifestyle, as she is now more active and with healthier intake - will repeat a1c today  - RTC in 3 mo for lifestyle f/u   #Neck pain - alleviated with movement and not a/w concerning findings - prn Advil or Tylenol  #s/p lobectomy - has not followed with ENT since 2020 - last US 2022 not suspicious - repeat US thyroid   #HCM  - a1c today - CBC, CMP, lipid panel, TSH done in May - results discussed with patient previously  - PHQ2 negative 5/2023  - s/p JALEESA 2016, no longer requires cervical cancer screen  - Mammo 5/2023 BIRADS-2, repeat next year  - Colonoscopy 2018 with sigmoid diverticulosis, mucosal ulceration (bx), ascending colon and cecum inflammation (bx), 8mm polyp in ascending colon (bx) - already scheduled with GI in January 2024 - Ophtho referral for eye exam - HIV, HCV 2018 nonreactive   - TDaP 2022  - Shingrix 2022 x1 - 2nd dose today  RTC in 3 months for lifestyle f/u or as needed  D/w Dr. Reagan

## 2023-11-06 ENCOUNTER — APPOINTMENT (OUTPATIENT)
Dept: INTERNAL MEDICINE | Facility: CLINIC | Age: 58
End: 2023-11-06
Payer: COMMERCIAL

## 2023-11-06 ENCOUNTER — OUTPATIENT (OUTPATIENT)
Dept: OUTPATIENT SERVICES | Facility: HOSPITAL | Age: 58
LOS: 1 days | End: 2023-11-06
Payer: SELF-PAY

## 2023-11-06 VITALS
OXYGEN SATURATION: 97 % | SYSTOLIC BLOOD PRESSURE: 110 MMHG | DIASTOLIC BLOOD PRESSURE: 78 MMHG | BODY MASS INDEX: 31.07 KG/M2 | HEIGHT: 64 IN | HEART RATE: 76 BPM | WEIGHT: 182 LBS

## 2023-11-06 DIAGNOSIS — M25.569 PAIN IN UNSPECIFIED KNEE: ICD-10-CM

## 2023-11-06 DIAGNOSIS — R73.03 PREDIABETES.: ICD-10-CM

## 2023-11-06 DIAGNOSIS — Z98.89 OTHER SPECIFIED POSTPROCEDURAL STATES: Chronic | ICD-10-CM

## 2023-11-06 DIAGNOSIS — Z98.51 TUBAL LIGATION STATUS: Chronic | ICD-10-CM

## 2023-11-06 DIAGNOSIS — I10 ESSENTIAL (PRIMARY) HYPERTENSION: ICD-10-CM

## 2023-11-06 PROCEDURE — G0463: CPT

## 2023-11-06 PROCEDURE — T1013: CPT

## 2023-11-06 PROCEDURE — 99213 OFFICE O/P EST LOW 20 MIN: CPT | Mod: GC

## 2023-11-10 PROBLEM — R73.03 PREDIABETES: Status: ACTIVE | Noted: 2023-05-01

## 2023-11-10 NOTE — ED ADULT NURSE NOTE - PRIMARY CARE PROVIDER
You can access the FollowMyHealth Patient Portal offered by [facility] by registering at the following website: [email or web address]. By joining [facility]’s FollowMyHealth portal, you will also be able to view your health information using other applications (apps) compatible with our system.
unknown

## 2023-11-16 DIAGNOSIS — K59.00 CONSTIPATION, UNSPECIFIED: ICD-10-CM

## 2023-11-16 DIAGNOSIS — Z00.00 ENCOUNTER FOR GENERAL ADULT MEDICAL EXAMINATION WITHOUT ABNORMAL FINDINGS: ICD-10-CM

## 2023-11-16 DIAGNOSIS — R73.03 PREDIABETES: ICD-10-CM

## 2023-11-16 DIAGNOSIS — Z86.79 PERSONAL HISTORY OF OTHER DISEASES OF THE CIRCULATORY SYSTEM: ICD-10-CM

## 2023-11-16 DIAGNOSIS — E78.5 HYPERLIPIDEMIA, UNSPECIFIED: ICD-10-CM

## 2023-11-16 DIAGNOSIS — M25.569 PAIN IN UNSPECIFIED KNEE: ICD-10-CM

## 2023-11-16 DIAGNOSIS — M79.18 MYALGIA, OTHER SITE: ICD-10-CM

## 2023-11-27 ENCOUNTER — APPOINTMENT (OUTPATIENT)
Dept: INTERNAL MEDICINE | Facility: CLINIC | Age: 58
End: 2023-11-27

## 2024-01-16 ENCOUNTER — OUTPATIENT (OUTPATIENT)
Dept: OUTPATIENT SERVICES | Facility: HOSPITAL | Age: 59
LOS: 1 days | End: 2024-01-16
Payer: SELF-PAY

## 2024-01-16 ENCOUNTER — APPOINTMENT (OUTPATIENT)
Dept: GASTROENTEROLOGY | Facility: HOSPITAL | Age: 59
End: 2024-01-16
Payer: COMMERCIAL

## 2024-01-16 VITALS
BODY MASS INDEX: 31.41 KG/M2 | HEART RATE: 102 BPM | TEMPERATURE: 98 F | DIASTOLIC BLOOD PRESSURE: 90 MMHG | WEIGHT: 184 LBS | HEIGHT: 64 IN | RESPIRATION RATE: 14 BRPM | SYSTOLIC BLOOD PRESSURE: 142 MMHG

## 2024-01-16 DIAGNOSIS — R10.9 UNSPECIFIED ABDOMINAL PAIN: ICD-10-CM

## 2024-01-16 DIAGNOSIS — R10.30 LOWER ABDOMINAL PAIN, UNSPECIFIED: ICD-10-CM

## 2024-01-16 DIAGNOSIS — Z98.89 OTHER SPECIFIED POSTPROCEDURAL STATES: Chronic | ICD-10-CM

## 2024-01-16 DIAGNOSIS — Z98.51 TUBAL LIGATION STATUS: Chronic | ICD-10-CM

## 2024-01-16 DIAGNOSIS — R19.4 CHANGE IN BOWEL HABIT: ICD-10-CM

## 2024-01-16 DIAGNOSIS — Z90.710 ACQUIRED ABSENCE OF BOTH CERVIX AND UTERUS: Chronic | ICD-10-CM

## 2024-01-16 PROCEDURE — 82728 ASSAY OF FERRITIN: CPT

## 2024-01-16 PROCEDURE — G0463: CPT

## 2024-01-16 PROCEDURE — 36415 COLL VENOUS BLD VENIPUNCTURE: CPT

## 2024-01-16 PROCEDURE — 80053 COMPREHEN METABOLIC PANEL: CPT

## 2024-01-16 PROCEDURE — ZZZZZ: CPT | Mod: GC

## 2024-01-16 PROCEDURE — 86255 FLUORESCENT ANTIBODY SCREEN: CPT

## 2024-01-16 PROCEDURE — 86038 ANTINUCLEAR ANTIBODIES: CPT

## 2024-01-16 NOTE — HISTORY OF PRESENT ILLNESS
[FreeTextEntry1] : 57F PMH HLD, L thyroid lobectomy, JALEESA, prediabetes, obesity, presented for new onset of constipation and lower abdominal pain.   Patient reports having difficulty defecating in the past 7 months. She has BM's every 2-3 days with straining and has to use laxatives sometimes. She has tried to increase fruit and fiber intake with inconsistent relieve. Stool is hard, pebble-like and dark brown in color, but no red blood in stool.  Lower abdominal pain started about 4 months ago. Pain is intermittent and cramping, not really associated with PO intake, but is associated with gas and bloating, and relived by defecation.   Last colonoscopy was more than 6 years ago, s/p resection of small tubular adenoma of the ascending colon and diverticulosis.   She was not clear about when she should repeat her colonoscopy but was told she should return if she becomes symptomatic. Patient is concerned that the polyps may have grown to cause obstructions. No prior EGD's.   She took famotidine and Nexium a few years ago due to heartburn. The antiacid medications relieved the symptoms. She denied heartburn, epigastric burn or concern with PO intake.   Patient denied fever, chills, chest pain, shortness of breath, unintentional weight loss, dysphagia, odynophagia, nausea, vomiting, decreased appetite, early satiety, gas/bloating, diarrhea, melena, hematochezia, and hematemesis.  Patient denied family history of H. pylori infection, gastric cancer, colon cancer, colon polyp, peptic ulcer disease, liver disease, cholelithiasis, pancreatic disease, pancreatic cancer, or inflammatory bowel diseases. She immig[place] from [place] and had indoor plumbing.

## 2024-01-16 NOTE — REASON FOR VISIT
HR=76 bpm, NIBP=96/51 mmhg, SpO2=97.0 %, Resp=12 B/min, EtCO2=33 mmHg, Apnea=5 Seconds, Case=3 [[employer] ] : provided by [employer]   [Time Spent: ____ minutes] : Total time spent using  services: [unfilled] minutes. The patient's primary language is not English thus required  services. [Initial Evaluation] : an initial evaluation [FreeTextEntry1] : change in bowel habit and abdominal pain; PH of adenoma [Interpreters_IDNumber] : [id number] [TWNoteComboBox1] : Citizen of [place]

## 2024-01-16 NOTE — END OF VISIT
[] : Fellow [FreeTextEntry3] : As modified and discussed with patient MD SINTIA [facility] Associate Professor of Medicine [facility] of [facility]

## 2024-01-16 NOTE — PHYSICAL EXAM
[Normal] : normal bowel sounds, non-tender, no masses, soft, no no hepato-splenomegaly [No CVA Tenderness] : no CVA  tenderness [No Spinal Tenderness] : no spinal tenderness [Abnormal Walk] : normal gait [No Clubbing, Cyanosis] : no clubbing or cyanosis of the fingernails [No Joint Swelling] : no joint swelling seen [Motor Tone] : muscle strength and tone were normal [Normal Color / Pigmentation] : normal skin color and pigmentation [] : no rash [No Focal Deficits] : no focal deficits [Oriented To Time, Place, And Person] : oriented to person, place, and time [Normal Affect] : the affect was normal [Normal Insight/Judgment] : insight and judgment were intact

## 2024-01-17 DIAGNOSIS — K57.30 DIVERTICULOSIS OF LARGE INTESTINE WITHOUT PERFORATION OR ABSCESS WITHOUT BLEEDING: ICD-10-CM

## 2024-01-17 DIAGNOSIS — R10.30 LOWER ABDOMINAL PAIN, UNSPECIFIED: ICD-10-CM

## 2024-01-17 DIAGNOSIS — D12.6 BENIGN NEOPLASM OF COLON, UNSPECIFIED: ICD-10-CM

## 2024-01-17 DIAGNOSIS — R19.4 CHANGE IN BOWEL HABIT: ICD-10-CM

## 2024-01-17 DIAGNOSIS — R74.8 ABNORMAL LEVELS OF OTHER SERUM ENZYMES: ICD-10-CM

## 2024-01-17 LAB
ALBUMIN SERPL ELPH-MCNC: 4.4 G/DL — SIGNIFICANT CHANGE UP (ref 3.3–5)
ALP SERPL-CCNC: 107 U/L — SIGNIFICANT CHANGE UP (ref 40–120)
ALT FLD-CCNC: 20 U/L — SIGNIFICANT CHANGE UP (ref 10–45)
ANION GAP SERPL CALC-SCNC: 11 MMOL/L — SIGNIFICANT CHANGE UP (ref 5–17)
AST SERPL-CCNC: 17 U/L — SIGNIFICANT CHANGE UP (ref 10–40)
BILIRUB SERPL-MCNC: 0.2 MG/DL — SIGNIFICANT CHANGE UP (ref 0.2–1.2)
BUN SERPL-MCNC: 15 MG/DL — SIGNIFICANT CHANGE UP (ref 7–23)
CALCIUM SERPL-MCNC: 9.4 MG/DL — SIGNIFICANT CHANGE UP (ref 8.4–10.5)
CHLORIDE SERPL-SCNC: 103 MMOL/L — SIGNIFICANT CHANGE UP (ref 96–108)
CO2 SERPL-SCNC: 26 MMOL/L — SIGNIFICANT CHANGE UP (ref 22–31)
CREAT SERPL-MCNC: 0.52 MG/DL — SIGNIFICANT CHANGE UP (ref 0.5–1.3)
EGFR: 109 ML/MIN/1.73M2 — SIGNIFICANT CHANGE UP
FERRITIN SERPL-MCNC: 28 NG/ML — SIGNIFICANT CHANGE UP (ref 13–330)
GLUCOSE SERPL-MCNC: 93 MG/DL — SIGNIFICANT CHANGE UP (ref 70–99)
POTASSIUM SERPL-MCNC: 4.1 MMOL/L — SIGNIFICANT CHANGE UP (ref 3.5–5.3)
POTASSIUM SERPL-SCNC: 4.1 MMOL/L — SIGNIFICANT CHANGE UP (ref 3.5–5.3)
PROT SERPL-MCNC: 7.4 G/DL — SIGNIFICANT CHANGE UP (ref 6–8.3)
SODIUM SERPL-SCNC: 140 MMOL/L — SIGNIFICANT CHANGE UP (ref 135–145)

## 2024-01-18 LAB
ANA PAT FLD IF-IMP: ABNORMAL
ANA TITR SER: ABNORMAL
SMOOTH MUSCLE AB SER-ACNC: ABNORMAL

## 2024-01-24 ENCOUNTER — OUTPATIENT (OUTPATIENT)
Dept: OUTPATIENT SERVICES | Facility: HOSPITAL | Age: 59
LOS: 1 days | End: 2024-01-24
Payer: SELF-PAY

## 2024-01-24 DIAGNOSIS — R74.8 ABNORMAL LEVELS OF OTHER SERUM ENZYMES: ICD-10-CM

## 2024-01-24 DIAGNOSIS — D12.6 BENIGN NEOPLASM OF COLON, UNSPECIFIED: ICD-10-CM

## 2024-01-24 DIAGNOSIS — K57.30 DIVERTICULOSIS OF LARGE INTESTINE WITHOUT PERFORATION OR ABSCESS WITHOUT BLEEDING: ICD-10-CM

## 2024-01-24 DIAGNOSIS — Z98.89 OTHER SPECIFIED POSTPROCEDURAL STATES: Chronic | ICD-10-CM

## 2024-01-24 DIAGNOSIS — R19.4 CHANGE IN BOWEL HABIT: ICD-10-CM

## 2024-01-24 DIAGNOSIS — Z90.710 ACQUIRED ABSENCE OF BOTH CERVIX AND UTERUS: Chronic | ICD-10-CM

## 2024-01-24 DIAGNOSIS — Z98.51 TUBAL LIGATION STATUS: Chronic | ICD-10-CM

## 2024-01-24 DIAGNOSIS — R10.30 LOWER ABDOMINAL PAIN, UNSPECIFIED: ICD-10-CM

## 2024-01-24 LAB — T4 FREE+ TSH PNL SERPL: 2.91 UIU/ML — SIGNIFICANT CHANGE UP (ref 0.27–4.2)

## 2024-01-24 PROCEDURE — 86381 MITOCHONDRIAL ANTIBODY EACH: CPT

## 2024-01-24 PROCEDURE — 84443 ASSAY THYROID STIM HORMONE: CPT

## 2024-01-24 PROCEDURE — 36415 COLL VENOUS BLD VENIPUNCTURE: CPT

## 2024-01-25 LAB — MITOCHONDRIA AB SER-ACNC: SIGNIFICANT CHANGE UP

## 2024-02-14 ENCOUNTER — OUTPATIENT (OUTPATIENT)
Dept: OUTPATIENT SERVICES | Facility: HOSPITAL | Age: 59
LOS: 1 days | End: 2024-02-14
Payer: SELF-PAY

## 2024-02-14 ENCOUNTER — TRANSCRIPTION ENCOUNTER (OUTPATIENT)
Age: 59
End: 2024-02-14

## 2024-02-14 VITALS
SYSTOLIC BLOOD PRESSURE: 119 MMHG | OXYGEN SATURATION: 99 % | DIASTOLIC BLOOD PRESSURE: 60 MMHG | HEART RATE: 84 BPM | RESPIRATION RATE: 16 BRPM

## 2024-02-14 VITALS
DIASTOLIC BLOOD PRESSURE: 77 MMHG | SYSTOLIC BLOOD PRESSURE: 137 MMHG | RESPIRATION RATE: 16 BRPM | OXYGEN SATURATION: 98 % | WEIGHT: 184.09 LBS | HEART RATE: 95 BPM | HEIGHT: 62 IN | TEMPERATURE: 99 F

## 2024-02-14 DIAGNOSIS — Z98.890 OTHER SPECIFIED POSTPROCEDURAL STATES: Chronic | ICD-10-CM

## 2024-02-14 DIAGNOSIS — Z98.89 OTHER SPECIFIED POSTPROCEDURAL STATES: Chronic | ICD-10-CM

## 2024-02-14 DIAGNOSIS — D12.6 BENIGN NEOPLASM OF COLON, UNSPECIFIED: ICD-10-CM

## 2024-02-14 DIAGNOSIS — K59.00 CONSTIPATION, UNSPECIFIED: ICD-10-CM

## 2024-02-14 DIAGNOSIS — Z90.710 ACQUIRED ABSENCE OF BOTH CERVIX AND UTERUS: Chronic | ICD-10-CM

## 2024-02-14 DIAGNOSIS — Z98.51 TUBAL LIGATION STATUS: Chronic | ICD-10-CM

## 2024-02-14 PROCEDURE — 45378 DIAGNOSTIC COLONOSCOPY: CPT | Mod: GC

## 2024-02-14 PROCEDURE — 45378 DIAGNOSTIC COLONOSCOPY: CPT

## 2024-02-14 DEVICE — NET RETRV ROT ROTH 2.5MMX230CM: Type: IMPLANTABLE DEVICE | Status: FUNCTIONAL

## 2024-02-14 RX ORDER — ASCORBIC ACID 60 MG
1 TABLET,CHEWABLE ORAL
Qty: 0 | Refills: 0 | DISCHARGE

## 2024-02-14 NOTE — ASU PATIENT PROFILE, ADULT - NSICDXPASTMEDICALHX_GEN_ALL_CORE_FT
PAST MEDICAL HISTORY:  Benign breast lumps, left     HTN (hypertension) pt states recently noted ; pt denies rx    Hypercholesterolemia     Iron deficiency anemia     No pertinent past medical history     Thyroid nodule left    Uterine leiomyoma

## 2024-02-14 NOTE — PRE PROCEDURE NOTE - PRE PROCEDURE EVALUATION
Attending Physician:                            Procedure: Colonoscopy    Indication for Procedure: History of colon polyps  ________________________________________________________  PAST MEDICAL & SURGICAL HISTORY:  Uterine leiomyoma      Iron deficiency anemia      Benign breast lumps, left      HTN (hypertension)  pt states recently noted ; pt denies rx      Thyroid nodule  left      Hypercholesterolemia      No pertinent past medical history      S/P tubal ligation  24 yrs ago      S/P breast biopsy, left  3/18 ; pt states " benign "      H/O abdominal hysterectomy  2016      No significant past surgical history        ALLERGIES:  Allergy Status Unknown  lactose (Diarrhea)    HOME MEDICATIONS:  Vitamin C 500 mg oral tablet: 1 tab(s) orally once a day    AICD/PPM: [ ] yes   [ ] no    PERTINENT LAB DATA:                      PHYSICAL EXAMINATION:    T(C): --  HR: --  BP: --  RR: --  SpO2: --    Constitutional: NAD    Neck:  No JVD  Respiratory: CTAB/L  Cardiovascular: S1 and S2  Gastrointestinal: BS+, soft, NT/ND  Extremities: No peripheral edema  Neurological: A/O x 3, no focal deficits        COMMENTS:    The patient is a suitable candidate for the planned procedure unless box checked [ ]  No, explain:     Attending Physician:                            Procedure: Colonoscopy    Indication for Procedure: History of colon polyps  ________________________________________________________  PAST MEDICAL & SURGICAL HISTORY:  Uterine leiomyoma      Iron deficiency anemia      Benign breast lumps, left      HTN (hypertension)  pt states recently noted ; pt denies rx      Thyroid nodule  left      Hypercholesterolemia      No pertinent past medical history      S/P tubal ligation  24 yrs ago      S/P breast biopsy, left  3/18 ; pt states " benign "      H/O abdominal hysterectomy  2016      No significant past surgical history        ALLERGIES:  Allergy Status Unknown  lactose (Diarrhea)    HOME MEDICATIONS:  Vitamin C 500 mg oral tablet: 1 tab(s) orally once a day    AICD/PPM: [ ] yes   [X] no  PHYSICAL EXAMINATION:    Vital Signs Last 24 Hrs  T(C): 37.2 (14 Feb 2024 12:01), Max: 37.2 (14 Feb 2024 11:18)  T(F): 98.9 (14 Feb 2024 11:18), Max: 98.9 (14 Feb 2024 11:18)  HR: 87 (14 Feb 2024 12:35) (87 - 95)  BP: 103/60 (14 Feb 2024 12:35) (103/60 - 137/77)  BP(mean): --  RR: 14 (14 Feb 2024 12:35) (14 - 16)  SpO2: 96% (14 Feb 2024 12:35) (96% - 98%)    Parameters below as of 14 Feb 2024 12:35  Patient On (Oxygen Delivery Method): nasal cannula  O2 Flow (L/min): 2      Constitutional: NAD    Neck:  No JVD  Respiratory: CTAB/L  Cardiovascular: S1 and S2  Gastrointestinal: BS+, soft, NT/ND  Extremities: No peripheral edema  Neurological: A/O x 3, no focal deficits        COMMENTS:    The patient is a suitable candidate for the planned procedure unless box checked [ ]  No, explain:

## 2024-02-14 NOTE — ASU DISCHARGE PLAN (ADULT/PEDIATRIC) - PATIENT EDUCATION MATERIALS PROVIED
Patient is on the MA Schedule today for dtap, hep a vaccine/injection.    SPECIFIC Action To Be Taken: Orders pending, please sign.     Provider pre-printed instructions given

## 2024-02-14 NOTE — ASU PATIENT PROFILE, ADULT - NSICDXPASTSURGICALHX_GEN_ALL_CORE_FT
PAST SURGICAL HISTORY:  H/O abdominal hysterectomy 2016    H/O hand surgery right hand with screws    S/P breast biopsy, left 3/18 ; pt states " benign "    S/P tubal ligation 24 yrs ago

## 2024-02-14 NOTE — ASU PATIENT PROFILE, ADULT - FALL HARM RISK - UNIVERSAL INTERVENTIONS
Bed in lowest position, wheels locked, appropriate side rails in place/Call bell, personal items and telephone in reach/Instruct patient to call for assistance before getting out of bed or chair/Non-slip footwear when patient is out of bed/[place] to call system/Physically safe environment - no spills, clutter or unnecessary equipment/Purposeful Proactive Rounding/Room/bathroom lighting operational, light cord in reach

## 2024-02-14 NOTE — ASU DISCHARGE PLAN (ADULT/PEDIATRIC) - NS MD DC FALL RISK RISK
For information on Fall & Injury Prevention, visit: [email or web address] OR  [email or web address] OR  https://www.cdc.gov/steadi/patient.html

## 2024-02-19 ENCOUNTER — APPOINTMENT (OUTPATIENT)
Dept: ULTRASOUND IMAGING | Facility: CLINIC | Age: 59
End: 2024-02-19
Payer: COMMERCIAL

## 2024-02-19 ENCOUNTER — OUTPATIENT (OUTPATIENT)
Dept: OUTPATIENT SERVICES | Facility: HOSPITAL | Age: 59
LOS: 1 days | End: 2024-02-19
Payer: COMMERCIAL

## 2024-02-19 DIAGNOSIS — Z90.710 ACQUIRED ABSENCE OF BOTH CERVIX AND UTERUS: Chronic | ICD-10-CM

## 2024-02-19 DIAGNOSIS — Z98.51 TUBAL LIGATION STATUS: Chronic | ICD-10-CM

## 2024-02-19 DIAGNOSIS — Z98.89 OTHER SPECIFIED POSTPROCEDURAL STATES: Chronic | ICD-10-CM

## 2024-02-19 DIAGNOSIS — Z98.890 OTHER SPECIFIED POSTPROCEDURAL STATES: Chronic | ICD-10-CM

## 2024-02-19 DIAGNOSIS — R76.8 OTHER SPECIFIED ABNORMAL IMMUNOLOGICAL FINDINGS IN SERUM: ICD-10-CM

## 2024-02-19 PROCEDURE — 76705 ECHO EXAM OF ABDOMEN: CPT

## 2024-02-19 PROCEDURE — 76705 ECHO EXAM OF ABDOMEN: CPT | Mod: 26

## 2024-02-27 ENCOUNTER — NON-APPOINTMENT (OUTPATIENT)
Age: 59
End: 2024-02-27

## 2024-03-06 ENCOUNTER — NON-APPOINTMENT (OUTPATIENT)
Age: 59
End: 2024-03-06

## 2024-03-06 DIAGNOSIS — R93.5 ABNORMAL FINDINGS ON DIAGNOSTIC IMAGING OF OTHER ABDOMINAL REGIONS, INCLUDING RETROPERITONEUM: ICD-10-CM

## 2024-03-08 ENCOUNTER — OUTPATIENT (OUTPATIENT)
Dept: OUTPATIENT SERVICES | Facility: HOSPITAL | Age: 59
LOS: 1 days | End: 2024-03-08
Payer: SELF-PAY

## 2024-03-08 ENCOUNTER — APPOINTMENT (OUTPATIENT)
Dept: RHEUMATOLOGY | Facility: HOSPITAL | Age: 59
End: 2024-03-08
Payer: COMMERCIAL

## 2024-03-08 ENCOUNTER — RESULT REVIEW (OUTPATIENT)
Age: 59
End: 2024-03-08

## 2024-03-08 VITALS — DIASTOLIC BLOOD PRESSURE: 84 MMHG | SYSTOLIC BLOOD PRESSURE: 124 MMHG | TEMPERATURE: 97.7 F | HEART RATE: 86 BPM

## 2024-03-08 DIAGNOSIS — M94.0 CHONDROCOSTAL JUNCTION SYNDROME [TIETZE]: ICD-10-CM

## 2024-03-08 DIAGNOSIS — Z90.710 ACQUIRED ABSENCE OF BOTH CERVIX AND UTERUS: Chronic | ICD-10-CM

## 2024-03-08 DIAGNOSIS — M06.9 RHEUMATOID ARTHRITIS, UNSPECIFIED: ICD-10-CM

## 2024-03-08 DIAGNOSIS — Z98.51 TUBAL LIGATION STATUS: Chronic | ICD-10-CM

## 2024-03-08 DIAGNOSIS — M54.50 LOW BACK PAIN, UNSPECIFIED: ICD-10-CM

## 2024-03-08 DIAGNOSIS — G89.29 LOW BACK PAIN, UNSPECIFIED: ICD-10-CM

## 2024-03-08 DIAGNOSIS — Z98.89 OTHER SPECIFIED POSTPROCEDURAL STATES: Chronic | ICD-10-CM

## 2024-03-08 DIAGNOSIS — Z98.890 OTHER SPECIFIED POSTPROCEDURAL STATES: Chronic | ICD-10-CM

## 2024-03-08 LAB
24R-OH-CALCIDIOL SERPL-MCNC: 26.3 NG/ML — LOW (ref 30–80)
APPEARANCE UR: CLEAR — SIGNIFICANT CHANGE UP
BACTERIA # UR AUTO: NEGATIVE /HPF — SIGNIFICANT CHANGE UP
BILIRUB UR-MCNC: NEGATIVE — SIGNIFICANT CHANGE UP
C3 SERPL-MCNC: 144 MG/DL — SIGNIFICANT CHANGE UP (ref 81–157)
CAST: 0 /LPF — SIGNIFICANT CHANGE UP (ref 0–4)
CK SERPL-CCNC: 79 U/L — SIGNIFICANT CHANGE UP (ref 25–170)
COLOR SPEC: YELLOW — SIGNIFICANT CHANGE UP
CREAT ?TM UR-MCNC: 33 MG/DL — SIGNIFICANT CHANGE UP
CRP SERPL-MCNC: <3 MG/L — SIGNIFICANT CHANGE UP
DIFF PNL FLD: NEGATIVE — SIGNIFICANT CHANGE UP
ERYTHROCYTE [SEDIMENTATION RATE] IN BLOOD: 33 MM/HR — HIGH (ref 0–20)
GLUCOSE UR QL: NEGATIVE MG/DL — SIGNIFICANT CHANGE UP
KETONES UR-MCNC: NEGATIVE MG/DL — SIGNIFICANT CHANGE UP
LEUKOCYTE ESTERASE UR-ACNC: ABNORMAL
NITRITE UR-MCNC: NEGATIVE — SIGNIFICANT CHANGE UP
PH UR: 7.5 — SIGNIFICANT CHANGE UP (ref 5–8)
PROT ?TM UR-MCNC: 7 MG/DL — SIGNIFICANT CHANGE UP (ref 0–12)
PROT UR-MCNC: NEGATIVE MG/DL — SIGNIFICANT CHANGE UP
PROT/CREAT UR-RTO: 0.2 RATIO — SIGNIFICANT CHANGE UP (ref 0–0.2)
RBC CASTS # UR COMP ASSIST: 2 /HPF — SIGNIFICANT CHANGE UP (ref 0–4)
RHEUMATOID FACT SERPL-ACNC: <10 IU/ML — SIGNIFICANT CHANGE UP (ref 0–13)
SP GR SPEC: 1.01 — SIGNIFICANT CHANGE UP (ref 1–1.03)
SQUAMOUS # UR AUTO: 6 /HPF — HIGH (ref 0–5)
THYROGLOB AB FLD IA-ACNC: <20 IU/ML — SIGNIFICANT CHANGE UP
THYROGLOB AB SERPL-ACNC: <20 IU/ML — SIGNIFICANT CHANGE UP
THYROPEROXIDASE AB SERPL-ACNC: 11.2 IU/ML — SIGNIFICANT CHANGE UP
UROBILINOGEN FLD QL: 0.2 MG/DL — SIGNIFICANT CHANGE UP (ref 0.2–1)
WBC UR QL: 6 /HPF — HIGH (ref 0–5)

## 2024-03-08 PROCEDURE — 81001 URINALYSIS AUTO W/SCOPE: CPT

## 2024-03-08 PROCEDURE — 86431 RHEUMATOID FACTOR QUANT: CPT

## 2024-03-08 PROCEDURE — 84156 ASSAY OF PROTEIN URINE: CPT

## 2024-03-08 PROCEDURE — 82306 VITAMIN D 25 HYDROXY: CPT

## 2024-03-08 PROCEDURE — G0463: CPT

## 2024-03-08 PROCEDURE — 36415 COLL VENOUS BLD VENIPUNCTURE: CPT

## 2024-03-08 PROCEDURE — 85652 RBC SED RATE AUTOMATED: CPT

## 2024-03-08 PROCEDURE — 86225 DNA ANTIBODY NATIVE: CPT

## 2024-03-08 PROCEDURE — 86800 THYROGLOBULIN ANTIBODY: CPT

## 2024-03-08 PROCEDURE — 86140 C-REACTIVE PROTEIN: CPT

## 2024-03-08 PROCEDURE — 86235 NUCLEAR ANTIGEN ANTIBODY: CPT

## 2024-03-08 PROCEDURE — 86160 COMPLEMENT ANTIGEN: CPT

## 2024-03-08 PROCEDURE — 73562 X-RAY EXAM OF KNEE 3: CPT | Mod: 26,50

## 2024-03-08 PROCEDURE — 82550 ASSAY OF CK (CPK): CPT

## 2024-03-08 PROCEDURE — ZZZZZ: CPT | Mod: GC

## 2024-03-08 PROCEDURE — 82570 ASSAY OF URINE CREATININE: CPT

## 2024-03-08 PROCEDURE — 73562 X-RAY EXAM OF KNEE 3: CPT

## 2024-03-08 PROCEDURE — 86480 TB TEST CELL IMMUN MEASURE: CPT

## 2024-03-08 RX ORDER — POLYETHYLENE GLYCOL 3350 17 G/17G
17 POWDER, FOR SOLUTION ORAL DAILY
Qty: 1 | Refills: 0 | Status: DISCONTINUED | COMMUNITY
Start: 2024-01-16 | End: 2024-03-08

## 2024-03-08 RX ORDER — BISACODYL 5 MG/1
5 TABLET, DELAYED RELEASE ORAL
Qty: 2 | Refills: 0 | Status: DISCONTINUED | COMMUNITY
Start: 2024-01-16 | End: 2024-03-08

## 2024-03-08 RX ORDER — MELOXICAM 15 MG/1
15 TABLET ORAL DAILY
Qty: 10 | Refills: 0 | Status: DISCONTINUED | COMMUNITY
Start: 2023-11-06 | End: 2024-03-08

## 2024-03-08 RX ORDER — POLYETHYLENE GLYCOL 3350 AND ELECTROLYTES WITH LEMON FLAVOR 236; 22.74; 6.74; 5.86; 2.97 G/4L; G/4L; G/4L; G/4L; G/4L
236 POWDER, FOR SOLUTION ORAL
Qty: 1 | Refills: 0 | Status: DISCONTINUED | COMMUNITY
Start: 2024-01-16 | End: 2024-03-08

## 2024-03-08 RX ORDER — POLYETHYLENE GLYCOL 3350 17 G/17G
17 POWDER, FOR SOLUTION ORAL
Qty: 510 | Refills: 0 | Status: DISCONTINUED | COMMUNITY
Start: 2023-11-06 | End: 2024-03-08

## 2024-03-08 NOTE — PHYSICAL EXAM
[General Appearance - In No Acute Distress] : in no acute distress [General Appearance - Alert] : alert [Sclera] : the sclera and conjunctiva were normal [PERRL With Normal Accommodation] : pupils were equal in size, round, and reactive to light [Extraocular Movements] : extraocular movements were intact [Oropharynx] : the oropharynx was normal [Outer Ear] : the ears and nose were normal in appearance [Neck Appearance] : the appearance of the neck was normal [Neck Cervical Mass (___cm)] : no neck mass was observed [Jugular Venous Distention Increased] : there was no jugular-venous distention [Thyroid Diffuse Enlargement] : the thyroid was not enlarged [Thyroid Nodule] : there were no palpable thyroid nodules [Auscultation Breath Sounds / Voice Sounds] : lungs were clear to auscultation bilaterally [Heart Rate And Rhythm] : heart rate was normal and rhythm regular [Heart Sounds] : normal S1 and S2 [Heart Sounds Gallop] : no gallops [Murmurs] : no murmurs [Bowel Sounds] : normal bowel sounds [Heart Sounds Pericardial Friction Rub] : no pericardial rub [Abdomen Soft] : soft [Abdomen Tenderness] : non-tender [Abdomen Mass (___ Cm)] : no abdominal mass palpated [No CVA Tenderness] : no ~M costovertebral angle tenderness [No Spinal Tenderness] : no spinal tenderness [Nail Clubbing] : no clubbing  or cyanosis of the fingernails [Abnormal Walk] : normal gait [Motor Tone] : muscle strength and tone were normal [Musculoskeletal - Swelling] : no joint swelling seen [Skin Color & Pigmentation] : normal skin color and pigmentation [Skin Turgor] : normal skin turgor [Deep Tendon Reflexes (DTR)] : deep tendon reflexes were 2+ and symmetric [] : no rash [No Focal Deficits] : no focal deficits [Sensation] : the sensory exam was normal to light touch and pinprick [Oriented To Time, Place, And Person] : oriented to person, place, and time [Impaired Insight] : insight and judgment were intact [Affect] : the affect was normal [FreeTextEntry1] : Crepitus in both knees, muscle strenght normal

## 2024-03-08 NOTE — ASSESSMENT
[FreeTextEntry1] : # Chronic lower back pain, no sciatica  # Bilateral leg pain from upper thigh to ankles  # Arthralgia in both knees  # Positive CLARA 1/2560 nuclear dots, ASMA 1/20 - Reports aforementioned symptoms for the past 2 months, no trauma or fall  - Occurs back pain 2-3 times weekly, mostly afternoon when she stands long time  - Leg pain almost every day, last 2 hour, no tingling or numbness  - Reports diffuse alopecia for the past 3 months. Denies fever, chills, oral/nasal ulcers, shortness of breath, chest pain, VTE/PE, muscle weakness, Raynaud, malar rash - Feels no back or leg pain when she takes ibuprofen daily  Plan: 1. Refer to PT for back pain  2. Voltaren gel/Lidocaine gel for lower back and knees  3. Order ROBEL, laith-1, dsDNA, thyroid ab, CBC/CMP, AMA, urine PCR RTC in 6 weeks   D/w Dr. Jacob Madrid MD Rheumatology Fellow PGY-4

## 2024-03-08 NOTE — HISTORY OF PRESENT ILLNESS
[None] : The patient is currently asymptomatic [FreeTextEntry1] :  ID: [id number] 58 year old female with PMH of HLD, Hepatic steatosis, Cholelithiasis. L thyroid lobectomy, JALEESA, prediabetes, obesity, being seen for an initial evaluation for joint pain and positive CLARA test.   The patient reports experiencing lower back pain, with more pronounced discomfort in both legs, particularly the left leg, extending from the hips to the ankles. She also report increased pain in the knees and ankles, although not consistently every day. The pain is sometimes accompanied by a tingling sensation around the ankles.   The lower back pain is most prevalent in the afternoon, with additional morning pain episodes lasting 1-2 hours, occurring 2-3 times weekly. These morning episodes typically last for about 2 hours. The patient feels better when she is taking ibuprofen once daily. Currently, the patient is not experiencing any pain.  Report diffuse alopecia for the past 3 months. Denies fever, chills, oral/nasal ulcers,, shortness of breath, chest pain, VTE/PE, muscle weakness, Raynaud, malar rash  Laboratory tests have revealed an CLARA 1/2560 with nuclear dots, and an ASMA1/20, with other lab results being unremarkable.  No family history of CTD [Anorexia] : no anorexia [Weight Loss] : no weight loss [Malaise] : no malaise [Fever] : no fever [Chills] : no chills [Fatigue] : no fatigue [Depression] : no depression [Malar Facial Rash] : no malar facial rash [Skin Lesions] : no lesions [Skin Nodules] : no skin nodules [Oral Ulcers] : no oral ulcers [Cough] : no cough [Dry Mouth] : no dry mouth [Dysphonia] : no dysphonia [Dysphagia] : no dysphagia [Shortness of Breath] : no shortness of breath [Chest Pain] : no chest pain [Arthralgias] : no arthralgias [Joint Swelling] : no joint swelling [Joint Warmth] : no joint warmth [Joint Deformity] : no joint deformity [Decreased ROM] : no decreased range of motion [Morning Stiffness] : no morning stiffness [Falls] : no falls [Difficulty Standing] : no difficulty standing [Difficulty Walking] : no difficulty walking [Dyspnea] : no dyspnea [Myalgias] : no myalgias [Muscle Spasms] : no muscle spasms [Muscle Weakness] : no muscle weakness [Muscle Cramping] : no muscle cramping [Visual Changes] : no visual changes [Eye Pain] : no eye pain [Eye Redness] : no eye redness [Dry Eyes] : no dry eyes

## 2024-03-10 LAB
ANTI-RIBONUCLEAR PROTEIN: <0.2 AI — SIGNIFICANT CHANGE UP
DSDNA AB FLD-ACNC: <0.2 AI — SIGNIFICANT CHANGE UP
DSDNA AB SER-ACNC: <12 IU/ML — SIGNIFICANT CHANGE UP
ENA JO1 AB SER-ACNC: <0.2 AI — SIGNIFICANT CHANGE UP
ENA SM AB FLD QL: <0.2 AI — SIGNIFICANT CHANGE UP
ENA SS-A AB FLD IA-ACNC: <0.2 AI — SIGNIFICANT CHANGE UP

## 2024-03-11 DIAGNOSIS — M94.0 CHONDROCOSTAL JUNCTION SYNDROME [TIETZE]: ICD-10-CM

## 2024-03-11 DIAGNOSIS — M54.50 LOW BACK PAIN, UNSPECIFIED: ICD-10-CM

## 2024-03-11 DIAGNOSIS — R74.8 ABNORMAL LEVELS OF OTHER SERUM ENZYMES: ICD-10-CM

## 2024-03-11 DIAGNOSIS — M79.18 MYALGIA, OTHER SITE: ICD-10-CM

## 2024-03-11 DIAGNOSIS — R76.8 OTHER SPECIFIED ABNORMAL IMMUNOLOGICAL FINDINGS IN SERUM: ICD-10-CM

## 2024-03-13 LAB
GAMMA INTERFERON BACKGROUND BLD IA-ACNC: 0.06 IU/ML — SIGNIFICANT CHANGE UP
M TB IFN-G BLD-IMP: NEGATIVE — SIGNIFICANT CHANGE UP
M TB IFN-G CD4+ BCKGRND COR BLD-ACNC: 0.01 IU/ML — SIGNIFICANT CHANGE UP
M TB IFN-G CD4+CD8+ BCKGRND COR BLD-ACNC: 0.03 IU/ML — SIGNIFICANT CHANGE UP
QUANT TB PLUS MITOGEN MINUS NIL: >10 IU/ML — SIGNIFICANT CHANGE UP

## 2024-03-25 ENCOUNTER — OUTPATIENT (OUTPATIENT)
Dept: OUTPATIENT SERVICES | Facility: HOSPITAL | Age: 59
LOS: 1 days | End: 2024-03-25
Payer: COMMERCIAL

## 2024-03-25 ENCOUNTER — APPOINTMENT (OUTPATIENT)
Dept: MRI IMAGING | Facility: CLINIC | Age: 59
End: 2024-03-25
Payer: COMMERCIAL

## 2024-03-25 DIAGNOSIS — Z98.890 OTHER SPECIFIED POSTPROCEDURAL STATES: Chronic | ICD-10-CM

## 2024-03-25 DIAGNOSIS — Z90.710 ACQUIRED ABSENCE OF BOTH CERVIX AND UTERUS: Chronic | ICD-10-CM

## 2024-03-25 DIAGNOSIS — Z98.89 OTHER SPECIFIED POSTPROCEDURAL STATES: Chronic | ICD-10-CM

## 2024-03-25 DIAGNOSIS — Z98.51 TUBAL LIGATION STATUS: Chronic | ICD-10-CM

## 2024-03-25 DIAGNOSIS — R93.5 ABNORMAL FINDINGS ON DIAGNOSTIC IMAGING OF OTHER ABDOMINAL REGIONS, INCLUDING RETROPERITONEUM: ICD-10-CM

## 2024-03-25 PROCEDURE — 74183 MRI ABD W/O CNTR FLWD CNTR: CPT

## 2024-03-25 PROCEDURE — A9585: CPT

## 2024-03-25 PROCEDURE — 74183 MRI ABD W/O CNTR FLWD CNTR: CPT | Mod: 26

## 2024-04-04 ENCOUNTER — NON-APPOINTMENT (OUTPATIENT)
Age: 59
End: 2024-04-04

## 2024-04-11 ENCOUNTER — NON-APPOINTMENT (OUTPATIENT)
Age: 59
End: 2024-04-11

## 2024-05-01 NOTE — ASU PREOP CHECKLIST - BP NONINVASIVE SYSTOLIC (MM HG)
The patient was seen in conjunction with the advanced practice provider, and I performed a substantive portion of the visit. I personally saw the patient and made/approved the management plan and take responsibility for the patient management. All medical decision making was performed by me. All laboratory studies, radiology, and EKGs were reviewed by me.     History: 67-year-old male presents for chest discomfort this morning along with some lightheadedness.  He does admit to having URI symptoms for the past several days including cough and congestion.  He is otherwise healthy does admit to traveling this past weekend to run a half marathon no symptoms with that although the illness started around that same time.  No history of DVT or PE.  Symptoms have resolved.  Physical exam:  Constitutional:  Awake, alert, well appearing, nontoxic  HEENT:  Normocephalic, atraumatic  Neck: Trachea midline, no stridor  Respiratory/Chest:  Clear to auscultation bilaterally, no wheezes, rhonchi, or rales  CV:  Regular rate and regular rhythm, no murmurs, gallops, or rubs 2+ symmetrical bilateral radial pulses  Neuro: A/O, normal speech  Extremities/MSK: No peripheral edema, calf tenderness or palpable cords  Skin:  Warm and dry    MDM: Patient presents for chest pain and lightheadedness in the setting of viral symptoms for the past several days and recent travel to run a marathon.  History obtained from the patient. Based on patient history, exam, and workup I estimate there is a low risk for diagnostic considerations including, but not limited to aortic dissection, pneumothorax.  ACS also considered, however patient has nondiagnostic EKG, troponin of x 2.  Chest x-ray on my independent interpretation and comfirmed by Radiology with no acute cardiopulmonary process no evidence of pneumonia.  Given his recent travel D-dimer was obtained which was negative therefore ruling out pulmonary embolism.  I have considered  admission/hospitalization for further workup and management of acute coronary syndrome patient has  low risk heart score of 3 thus I considered the discharge disposition reasonable.      Suspected diagnosis and risks discussed with the patient including the utility of the Heart score, and after shared decision-making discussion agreed with discharging home to follow up closely with primary care doctor or cardiologist.  Also discussed return instructions including the symptoms which are most concerning including difficulty breathing, exertional chest pain associated with SOB, nausea, or diaphoresis, and changing or worsening symptoms that would necessitate immediate return.            Sunshine Marinelli MD  05/01/24 0320     123

## 2024-05-10 ENCOUNTER — APPOINTMENT (OUTPATIENT)
Dept: RHEUMATOLOGY | Facility: HOSPITAL | Age: 59
End: 2024-05-10
Payer: SELF-PAY

## 2024-05-10 ENCOUNTER — OUTPATIENT (OUTPATIENT)
Dept: OUTPATIENT SERVICES | Facility: HOSPITAL | Age: 59
LOS: 1 days | End: 2024-05-10
Payer: SELF-PAY

## 2024-05-10 VITALS
RESPIRATION RATE: 16 BRPM | WEIGHT: 184 LBS | HEIGHT: 64 IN | OXYGEN SATURATION: 98 % | SYSTOLIC BLOOD PRESSURE: 105 MMHG | BODY MASS INDEX: 31.41 KG/M2 | DIASTOLIC BLOOD PRESSURE: 66 MMHG | TEMPERATURE: 97 F | HEART RATE: 82 BPM

## 2024-05-10 DIAGNOSIS — R76.8 OTHER SPECIFIED ABNORMAL IMMUNOLOGICAL FINDINGS IN SERUM: ICD-10-CM

## 2024-05-10 DIAGNOSIS — Z98.890 OTHER SPECIFIED POSTPROCEDURAL STATES: Chronic | ICD-10-CM

## 2024-05-10 DIAGNOSIS — M06.9 RHEUMATOID ARTHRITIS, UNSPECIFIED: ICD-10-CM

## 2024-05-10 DIAGNOSIS — Z98.51 TUBAL LIGATION STATUS: Chronic | ICD-10-CM

## 2024-05-10 DIAGNOSIS — M79.18 MYALGIA, OTHER SITE: ICD-10-CM

## 2024-05-10 DIAGNOSIS — Z98.89 OTHER SPECIFIED POSTPROCEDURAL STATES: Chronic | ICD-10-CM

## 2024-05-10 DIAGNOSIS — Z90.710 ACQUIRED ABSENCE OF BOTH CERVIX AND UTERUS: Chronic | ICD-10-CM

## 2024-05-10 PROCEDURE — G0463: CPT

## 2024-05-10 PROCEDURE — ZZZZZ: CPT | Mod: GC

## 2024-05-10 NOTE — HISTORY OF PRESENT ILLNESS
[None] : The patient is currently asymptomatic [de-identified] : 3/24 [FreeTextEntry1] : Pt presented for follow up Currently has no complaints Denies fever, chills, chest pain, SOB, rash, joint pain, diarrhea, NV, abdominal pain, oral/nasal ulcers  [Anorexia] : no anorexia [Weight Loss] : no weight loss [Malaise] : no malaise [Fever] : no fever [Chills] : no chills [Fatigue] : no fatigue [Depression] : no depression [Malar Facial Rash] : no malar facial rash [Skin Lesions] : no lesions [Skin Nodules] : no skin nodules [Oral Ulcers] : no oral ulcers [Cough] : no cough [Dry Mouth] : no dry mouth [Dysphonia] : no dysphonia [Dysphagia] : no dysphagia [Shortness of Breath] : no shortness of breath [Chest Pain] : no chest pain [Arthralgias] : no arthralgias [Joint Swelling] : no joint swelling [Joint Warmth] : no joint warmth [Joint Deformity] : no joint deformity [Decreased ROM] : no decreased range of motion [Morning Stiffness] : no morning stiffness [Falls] : no falls [Difficulty Standing] : no difficulty standing [Difficulty Walking] : no difficulty walking [Dyspnea] : no dyspnea [Myalgias] : no myalgias [Muscle Weakness] : no muscle weakness [Muscle Spasms] : no muscle spasms [Muscle Cramping] : no muscle cramping [Visual Changes] : no visual changes [Eye Pain] : no eye pain [Eye Redness] : no eye redness [Dry Eyes] : no dry eyes

## 2024-05-10 NOTE — END OF VISIT
[] : Fellow [FreeTextEntry3] : Patient seen with Dr Madrid. Referred for backpain and positive CLARA. CLARA subtyping negative. Anti-smooth muscle Ab stable. Pain improved. Can be discharged from clinic.

## 2024-05-10 NOTE — PHYSICAL EXAM
[General Appearance - Alert] : alert [General Appearance - In No Acute Distress] : in no acute distress [Sclera] : the sclera and conjunctiva were normal [PERRL With Normal Accommodation] : pupils were equal in size, round, and reactive to light [Extraocular Movements] : extraocular movements were intact [Outer Ear] : the ears and nose were normal in appearance [Oropharynx] : the oropharynx was normal [Neck Appearance] : the appearance of the neck was normal [Neck Cervical Mass (___cm)] : no neck mass was observed [Jugular Venous Distention Increased] : there was no jugular-venous distention [Thyroid Diffuse Enlargement] : the thyroid was not enlarged [Thyroid Nodule] : there were no palpable thyroid nodules [Auscultation Breath Sounds / Voice Sounds] : lungs were clear to auscultation bilaterally [Heart Rate And Rhythm] : heart rate was normal and rhythm regular [Heart Sounds] : normal S1 and S2 [Heart Sounds Gallop] : no gallops [Murmurs] : no murmurs [Heart Sounds Pericardial Friction Rub] : no pericardial rub [Bowel Sounds] : normal bowel sounds [Abdomen Soft] : soft [Abdomen Tenderness] : non-tender [Abdomen Mass (___ Cm)] : no abdominal mass palpated [No CVA Tenderness] : no ~M costovertebral angle tenderness [No Spinal Tenderness] : no spinal tenderness [Skin Color & Pigmentation] : normal skin color and pigmentation [Skin Turgor] : normal skin turgor [] : no rash [Sensation] : the sensory exam was normal to light touch and pinprick [Deep Tendon Reflexes (DTR)] : deep tendon reflexes were 2+ and symmetric [No Focal Deficits] : no focal deficits [Oriented To Time, Place, And Person] : oriented to person, place, and time [Impaired Insight] : insight and judgment were intact [Affect] : the affect was normal [Abnormal Walk] : normal gait [Nail Clubbing] : no clubbing  or cyanosis of the fingernails [Musculoskeletal - Swelling] : no joint swelling seen [Motor Tone] : muscle strength and tone were normal [FreeTextEntry1] : Crepitus in both knees, muscle strenght normal

## 2024-05-10 NOTE — ASSESSMENT
[FreeTextEntry1] : # Chronic lower back pain, no sciatica, resolved  # Bilateral leg pain from upper thigh to ankles, resolved # Arthralgia in both knees, resolved  # Positive CLARA 1/2560 nuclear dots, ASMA 1/20 - Reports aforementioned symptoms for the past 2 months, no trauma or fall  - Occurs back pain 2-3 times weekly, mostly afternoon when she stands long time > resolved  - Leg pain almost every day, last 2 hour, no tingling or numbness > resolved  - Reports diffuse alopecia for the past 3 months. Denies fever, chills, oral/nasal ulcers, shortness of breath, chest pain, VTE/PE, muscle weakness, Raynaud, malar rash - Feels no back or leg pain when she takes ibuprofen daily - Exercise daily 30 min daily  - S/p PT for 2 weeks  - Negative > ROBEL, laith-1, dsDNA, thyroid ab, CBC/CMP, AMA, urine PCR  Plan: 1. Voltaren gel/Lidocaine gel PRN for lower back and knees   RTC PRN  D/w Dr. Jacob Mardid MD Rheumatology Fellow PGY-4

## 2024-05-13 ENCOUNTER — OUTPATIENT (OUTPATIENT)
Dept: OUTPATIENT SERVICES | Facility: HOSPITAL | Age: 59
LOS: 1 days | End: 2024-05-13
Payer: SELF-PAY

## 2024-05-13 ENCOUNTER — APPOINTMENT (OUTPATIENT)
Dept: SURGERY | Facility: HOSPITAL | Age: 59
End: 2024-05-13
Payer: COMMERCIAL

## 2024-05-13 VITALS
SYSTOLIC BLOOD PRESSURE: 121 MMHG | HEART RATE: 83 BPM | RESPIRATION RATE: 15 BRPM | DIASTOLIC BLOOD PRESSURE: 82 MMHG | OXYGEN SATURATION: 99 % | TEMPERATURE: 99.14 F

## 2024-05-13 DIAGNOSIS — Z98.51 TUBAL LIGATION STATUS: Chronic | ICD-10-CM

## 2024-05-13 DIAGNOSIS — Z98.890 OTHER SPECIFIED POSTPROCEDURAL STATES: Chronic | ICD-10-CM

## 2024-05-13 DIAGNOSIS — L72.3 SEBACEOUS CYST: ICD-10-CM

## 2024-05-13 DIAGNOSIS — Z90.710 ACQUIRED ABSENCE OF BOTH CERVIX AND UTERUS: Chronic | ICD-10-CM

## 2024-05-13 PROCEDURE — G0463: CPT

## 2024-05-13 PROCEDURE — 99204 OFFICE O/P NEW MOD 45 MIN: CPT

## 2024-05-14 DIAGNOSIS — R10.9 UNSPECIFIED ABDOMINAL PAIN: ICD-10-CM

## 2024-06-03 ENCOUNTER — APPOINTMENT (OUTPATIENT)
Dept: INTERNAL MEDICINE | Facility: CLINIC | Age: 59
End: 2024-06-03
Payer: COMMERCIAL

## 2024-06-03 ENCOUNTER — NON-APPOINTMENT (OUTPATIENT)
Age: 59
End: 2024-06-03

## 2024-06-03 ENCOUNTER — OUTPATIENT (OUTPATIENT)
Dept: OUTPATIENT SERVICES | Facility: HOSPITAL | Age: 59
LOS: 1 days | End: 2024-06-03
Payer: SELF-PAY

## 2024-06-03 VITALS
DIASTOLIC BLOOD PRESSURE: 80 MMHG | HEIGHT: 64 IN | HEART RATE: 69 BPM | OXYGEN SATURATION: 95 % | SYSTOLIC BLOOD PRESSURE: 118 MMHG | BODY MASS INDEX: 30.56 KG/M2 | WEIGHT: 179 LBS

## 2024-06-03 DIAGNOSIS — Z90.710 ACQUIRED ABSENCE OF BOTH CERVIX AND UTERUS: Chronic | ICD-10-CM

## 2024-06-03 DIAGNOSIS — I10 ESSENTIAL (PRIMARY) HYPERTENSION: ICD-10-CM

## 2024-06-03 DIAGNOSIS — R10.13 EPIGASTRIC PAIN: ICD-10-CM

## 2024-06-03 DIAGNOSIS — Z98.51 TUBAL LIGATION STATUS: Chronic | ICD-10-CM

## 2024-06-03 DIAGNOSIS — K21.9 GASTRO-ESOPHAGEAL REFLUX DISEASE W/OUT ESOPHAGITIS: ICD-10-CM

## 2024-06-03 DIAGNOSIS — Z87.19 PERSONAL HISTORY OF OTHER DISEASES OF THE DIGESTIVE SYSTEM: ICD-10-CM

## 2024-06-03 DIAGNOSIS — E78.5 HYPERLIPIDEMIA, UNSPECIFIED: ICD-10-CM

## 2024-06-03 DIAGNOSIS — Z86.79 PERSONAL HISTORY OF OTHER DISEASES OF THE CIRCULATORY SYSTEM: ICD-10-CM

## 2024-06-03 DIAGNOSIS — Z00.00 ENCOUNTER FOR GENERAL ADULT MEDICAL EXAMINATION W/OUT ABNORMAL FINDINGS: ICD-10-CM

## 2024-06-03 DIAGNOSIS — E66.3 OVERWEIGHT: ICD-10-CM

## 2024-06-03 DIAGNOSIS — R10.84 GENERALIZED ABDOMINAL PAIN: ICD-10-CM

## 2024-06-03 DIAGNOSIS — Z98.89 OTHER SPECIFIED POSTPROCEDURAL STATES: Chronic | ICD-10-CM

## 2024-06-03 DIAGNOSIS — Z98.890 OTHER SPECIFIED POSTPROCEDURAL STATES: Chronic | ICD-10-CM

## 2024-06-03 DIAGNOSIS — R74.01 ELEVATION OF LEVELS OF LIVER TRANSAMINASE LEVELS: ICD-10-CM

## 2024-06-03 PROCEDURE — T1013: CPT

## 2024-06-03 PROCEDURE — G0463: CPT

## 2024-06-03 PROCEDURE — 99213 OFFICE O/P EST LOW 20 MIN: CPT | Mod: GE

## 2024-06-03 RX ORDER — FAMOTIDINE 20 MG/1
20 TABLET, FILM COATED ORAL
Qty: 60 | Refills: 3 | Status: ACTIVE | COMMUNITY
Start: 2024-06-03 | End: 1900-01-01

## 2024-06-03 NOTE — PLAN
[FreeTextEntry1] : 57F PMH HLD, L thyroid lobectomy, JALEESA, prediabetes, obesity, presenting for a lifestyle follow up. 58 year old female with PMH of HLD, Hepatic steatosis, Cholelithiasis. L thyroid lobectomy, JALEESA, prediabetes, obesity, being seen for follow up for abodminal and chest pain   #Chest pain - Atypical- not exertion, risk factors - both brothers CABG and MI, no smoking - EKG done and reviewed by me  - Cards referral given risk factors (CT coronary vs Stress)   #Abdominal pain - epigastric- severe, not associated with food  - MRI : large gallstone, hiatal hernia, pancreatic dvisium ddx- Hiatal hernia / GERD vs Pancreatitis (less likely w/o bowel movement changes or radiation to back), low concern for symptomatic cholelitahsis (no RUQ pain) - GI apt 6/18 - Pepcid 20 BID for symptoms for now   #Obesity - patient actively working on weight management with diet and exercise. #HLD - ASCVD 2.8%' - Will decrease shrimp intake (eats every week)  #prediabetes - encouraged continuing current changes to lifestyle, as she is now more active and with healthier intake - Motivated for lifestyle modification, lost 7 lbs since last visit, continues to exercise. - a1c 5.9  #back pain - likely MSK vs Meralgia paresthetica - ctm as per Rheum, symptom mgmt   #Constipation - chronic - patient already takes fiber supplements - Colonoscopy - 2024 - Diverticulosis, internal hemorrhoids  - continue fiber supplements  #s/p lobectomy of thyroid  - has not followed with ENT since 2020 - last US 2022 not suspicious - will require f/u U/S thyroid.  #HCM - Due for Labs - Will get next visit - PHQ2 negative 5/2023 - s/p JALEESA 2016, no longer requires cervical cancer screen - Mammo 5/2023 BIRADS-2 - DUE at next CPE  - Colonoscopy 2024 - Diverticulosis, internal hemorroids, repeat in 10 y. - HIV, HCV 2018 nonreactive - TDaP 2022 - Shingrix 2022 x1 - 2nd dose  Case d/w Dr. Goldman, Plan to RTC in 10 weeks for CPE - Labs, Mammo and f/u abdominal pain

## 2024-06-03 NOTE — HISTORY OF PRESENT ILLNESS
[FreeTextEntry1] : Follow Up  [de-identified] : 58 year old female with PMH of HLD, Hepatic steatosis, Cholelithiasis. L thyroid lobectomy, JALEESA, prediabetes, obesity, being seen for follow up for abodminal and chest pain  - Saw Rheum for + CLARA, ASMA 1/20 - continued work up  - Recently saw Surgery for abdominal pain - Found to have hiatal hernia, large gallstone and pancreatic divisium, recommended GI appointment and follow up EGD. 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  - BMs - regular daily, no diarrhea or constipation, - Dark or tarry stools - denies - Continues to use fiber supplement.  #New Chest pain - past 2 months  - Center of chest, not exertion, not associated with food, no radiation, dull and moves to the left - Brother with CABG recently, Older Brother w/ MI 59   No smoking hx Social hx In terms of diet, eats cereal, granola, yogurt for breakfast. Has a large bowl of fruits, lots of broccoli, tomatoes, and mix of lentils. Makes own dressing with lemon and salt, sometimes adds vinegar. Loves salmon and all types of fish, cooks with light oil, grilled chicken with as much fat removed as possible. Noted to eat a lot shrimp. For exercise does 40 min in the morning, 25 min in the evening every day. Loves to dance.  Current Medications - Multivitamins, and aspirin  Labs reviewed- Hyperlipidemia

## 2024-06-03 NOTE — REVIEW OF SYSTEMS
[Chest Pain] : chest pain [Abdominal Pain] : abdominal pain [Heartburn] : heartburn [Negative] : Heme/Lymph [Palpitations] : no palpitations [Leg Claudication] : no leg claudication [Lower Ext Edema] : no lower extremity edema [Orthopnea] : no orthopnea [Paroxysmal Nocturnal Dyspnea] : no paroxysmal nocturnal dyspnea [Nausea] : no nausea [Constipation] : no constipation [Diarrhea] : diarrhea [Vomiting] : no vomiting [Melena] : no melena

## 2024-06-03 NOTE — PHYSICAL EXAM
[No Acute Distress] : no acute distress [Well Nourished] : well nourished [Normal Sclera/Conjunctiva] : normal sclera/conjunctiva [No JVD] : no jugular venous distention [No Respiratory Distress] : no respiratory distress  [Normal Rate] : normal rate  [Soft] : abdomen soft [No CVA Tenderness] : no CVA  tenderness [No Rash] : no rash [de-identified] : Tenderness to epigastric area, Soft non-tender elsewhere

## 2024-06-10 DIAGNOSIS — R74.01 ELEVATION OF LEVELS OF LIVER TRANSAMINASE LEVELS: ICD-10-CM

## 2024-06-10 DIAGNOSIS — K80.50 CALCULUS OF BILE DUCT WITHOUT CHOLANGITIS OR CHOLECYSTITIS WITHOUT OBSTRUCTION: ICD-10-CM

## 2024-06-10 DIAGNOSIS — E78.5 HYPERLIPIDEMIA, UNSPECIFIED: ICD-10-CM

## 2024-06-10 DIAGNOSIS — R10.13 EPIGASTRIC PAIN: ICD-10-CM

## 2024-06-10 DIAGNOSIS — K57.30 DIVERTICULOSIS OF LARGE INTESTINE WITHOUT PERFORATION OR ABSCESS WITHOUT BLEEDING: ICD-10-CM

## 2024-06-10 DIAGNOSIS — Z86.79 PERSONAL HISTORY OF OTHER DISEASES OF THE CIRCULATORY SYSTEM: ICD-10-CM

## 2024-06-10 DIAGNOSIS — E66.3 OVERWEIGHT: ICD-10-CM

## 2024-06-10 DIAGNOSIS — R10.84 GENERALIZED ABDOMINAL PAIN: ICD-10-CM

## 2024-06-10 DIAGNOSIS — K21.9 GASTRO-ESOPHAGEAL REFLUX DISEASE WITHOUT ESOPHAGITIS: ICD-10-CM

## 2024-06-10 DIAGNOSIS — R07.9 CHEST PAIN, UNSPECIFIED: ICD-10-CM

## 2024-06-18 ENCOUNTER — OUTPATIENT (OUTPATIENT)
Dept: OUTPATIENT SERVICES | Facility: HOSPITAL | Age: 59
LOS: 1 days | End: 2024-06-18
Payer: SELF-PAY

## 2024-06-18 ENCOUNTER — APPOINTMENT (OUTPATIENT)
Dept: GASTROENTEROLOGY | Facility: HOSPITAL | Age: 59
End: 2024-06-18
Payer: COMMERCIAL

## 2024-06-18 VITALS
RESPIRATION RATE: 15 BRPM | HEART RATE: 89 BPM | OXYGEN SATURATION: 97 % | BODY MASS INDEX: 30.04 KG/M2 | DIASTOLIC BLOOD PRESSURE: 84 MMHG | SYSTOLIC BLOOD PRESSURE: 136 MMHG | TEMPERATURE: 206.6 F | WEIGHT: 175 LBS

## 2024-06-18 DIAGNOSIS — Z98.89 OTHER SPECIFIED POSTPROCEDURAL STATES: Chronic | ICD-10-CM

## 2024-06-18 DIAGNOSIS — K57.30 DIVERTICULOSIS OF LARGE INTESTINE W/OUT PERFORATION OR ABSCESS W/OUT BLEEDING: ICD-10-CM

## 2024-06-18 DIAGNOSIS — Z98.51 TUBAL LIGATION STATUS: Chronic | ICD-10-CM

## 2024-06-18 DIAGNOSIS — D12.6 BENIGN NEOPLASM OF COLON, UNSPECIFIED: ICD-10-CM

## 2024-06-18 DIAGNOSIS — R10.13 EPIGASTRIC PAIN: ICD-10-CM

## 2024-06-18 DIAGNOSIS — R74.8 ABNORMAL LEVELS OF OTHER SERUM ENZYMES: ICD-10-CM

## 2024-06-18 DIAGNOSIS — R10.9 UNSPECIFIED ABDOMINAL PAIN: ICD-10-CM

## 2024-06-18 DIAGNOSIS — Z98.890 OTHER SPECIFIED POSTPROCEDURAL STATES: Chronic | ICD-10-CM

## 2024-06-18 DIAGNOSIS — Z90.710 ACQUIRED ABSENCE OF BOTH CERVIX AND UTERUS: Chronic | ICD-10-CM

## 2024-06-18 DIAGNOSIS — K59.00 CONSTIPATION, UNSPECIFIED: ICD-10-CM

## 2024-06-18 PROCEDURE — 80053 COMPREHEN METABOLIC PANEL: CPT

## 2024-06-18 PROCEDURE — ZZZZZ: CPT | Mod: GC

## 2024-06-18 PROCEDURE — 86381 MITOCHONDRIAL ANTIBODY EACH: CPT

## 2024-06-18 PROCEDURE — 83690 ASSAY OF LIPASE: CPT

## 2024-06-18 PROCEDURE — G0463: CPT

## 2024-06-19 LAB
ALBUMIN SERPL ELPH-MCNC: 4.6 G/DL — SIGNIFICANT CHANGE UP (ref 3.3–5)
ALP SERPL-CCNC: 96 U/L — SIGNIFICANT CHANGE UP (ref 40–120)
ALT FLD-CCNC: 24 U/L — SIGNIFICANT CHANGE UP (ref 10–45)
ANION GAP SERPL CALC-SCNC: 14 MMOL/L — SIGNIFICANT CHANGE UP (ref 5–17)
AST SERPL-CCNC: 23 U/L — SIGNIFICANT CHANGE UP (ref 10–40)
BILIRUB SERPL-MCNC: <0.2 MG/DL — SIGNIFICANT CHANGE UP (ref 0.2–1.2)
BUN SERPL-MCNC: 11 MG/DL — SIGNIFICANT CHANGE UP (ref 7–23)
CALCIUM SERPL-MCNC: 9.7 MG/DL — SIGNIFICANT CHANGE UP (ref 8.4–10.5)
CHLORIDE SERPL-SCNC: 102 MMOL/L — SIGNIFICANT CHANGE UP (ref 96–108)
CO2 SERPL-SCNC: 24 MMOL/L — SIGNIFICANT CHANGE UP (ref 22–31)
CREAT SERPL-MCNC: 0.62 MG/DL — SIGNIFICANT CHANGE UP (ref 0.5–1.3)
EGFR: 104 ML/MIN/1.73M2 — SIGNIFICANT CHANGE UP
GLUCOSE SERPL-MCNC: 75 MG/DL — SIGNIFICANT CHANGE UP (ref 70–99)
LIDOCAIN IGE QN: 44 U/L — SIGNIFICANT CHANGE UP (ref 13–60)
POTASSIUM SERPL-MCNC: 4.2 MMOL/L — SIGNIFICANT CHANGE UP (ref 3.5–5.3)
POTASSIUM SERPL-SCNC: 4.2 MMOL/L — SIGNIFICANT CHANGE UP (ref 3.5–5.3)
PROT SERPL-MCNC: 7.8 G/DL — SIGNIFICANT CHANGE UP (ref 6–8.3)
SODIUM SERPL-SCNC: 141 MMOL/L — SIGNIFICANT CHANGE UP (ref 135–145)

## 2024-06-19 NOTE — END OF VISIT
[] : Fellow [FreeTextEntry3] : As modified and discussed with patient MD SINTIA [facility] Associate Professor of Medicine [facility]

## 2024-06-19 NOTE — PHYSICAL EXAM
[Normal] : heart rate was normal and rhythm regular, normal S1 and S2, no murmurs [Abdomen Soft] : soft [Abnormal Walk] : normal gait [No Focal Deficits] : no focal deficits [Oriented To Time, Place, And Person] : oriented to person, place, and time [Normal Insight/Judgment] : insight and judgment were intact [de-identified] : Significant epigastric tenderness as well as L lower rib tenderness. (+) L periumbilical tenderness

## 2024-06-19 NOTE — ASSESSMENT
[FreeTextEntry1] : 58F with PMH of HLD, Hepatic steatosis, cholelithiasis. L thyroid lobectomy, JALEESA, prediabetes, obesity, presented for follow up of epigastric pain.  #Recurrent epigastric pain  - unclear if it's GI-origin given lack of correlation with PO or BM's, (+) L lower rib tenderness and lack of other GI symptoms. MSK pain?? severe esophagitis? - MRCP with large gallstone, but no overt biliary symptoms - Large hiatal hernia may contribute to reflux symptoms, but unlikely to explain patient's severe pain - Mesenteric vessels appeared wnl on MRCP - Low suspicion for pancreatitis, will r/o #Constipation, colonic diverticulosis, tubular adenoma (2018) - s/p colonoscopy 2/2024 #Elevated ALP - resolved; CLARA 1:2560 in nuclear dot pattern, negative liver and rheum workup otherwise  Plan - Will plan for EGD for epigastric pain assessment - Continue with famotidine for acid reflux (good symptomatic control) - Will send CMP, anti-M2 antibody, lipase today  Case d/w Dr. Elliott Zapien, PGY-5

## 2024-06-19 NOTE — HISTORY OF PRESENT ILLNESS
[FreeTextEntry1] : 58F with PMH of HLD, Hepatic steatosis, cholelithiasis. L thyroid lobectomy, JALEESA, prediabetes, obesity, presented for follow up of epigastric pain.  Patient was seen in 1/2024 for constipation and lower abdominal pain, s/p colonoscopy 2/2024 w. diverticulosis and hemorrhoids. Found to have high CLARA, s/p extensive liver and rheum workup without significant findings. MRCP revealed large gallstone and large hiatal hernia.  Patient returned to clinic with concern for severe epigastric pain. Pain was not particularly related to PO intake, and was relatively constant through the day. The intensity of pain fluctuated at times, but patient couldn't identify particular triggers or alleviating factors. Patient had acid reflux in the past, which improved with famotidine use (however, didn't alter the pain). No prior EGD. No change in bowel habits, no nausea, vomiting or change in PO intake. Patient reported intentional weight loss with diet and exercises.

## 2024-06-19 NOTE — REVIEW OF SYSTEMS
[Recent Weight Loss (___ Lbs)] : recent [unfilled] ~Ulb weight loss [As Noted in HPI] : as noted in HPI [Negative] : Musculoskeletal [Fever] : no fever [Chills] : no chills

## 2024-06-20 DIAGNOSIS — K59.00 CONSTIPATION, UNSPECIFIED: ICD-10-CM

## 2024-06-20 DIAGNOSIS — K57.30 DIVERTICULOSIS OF LARGE INTESTINE WITHOUT PERFORATION OR ABSCESS WITHOUT BLEEDING: ICD-10-CM

## 2024-06-20 DIAGNOSIS — R10.13 EPIGASTRIC PAIN: ICD-10-CM

## 2024-06-20 DIAGNOSIS — D12.6 BENIGN NEOPLASM OF COLON, UNSPECIFIED: ICD-10-CM

## 2024-06-20 DIAGNOSIS — R74.8 ABNORMAL LEVELS OF OTHER SERUM ENZYMES: ICD-10-CM

## 2024-06-20 LAB — MITOCHONDRIA M2 AB SER IA-ACNC: SIGNIFICANT CHANGE UP

## 2024-06-26 ENCOUNTER — APPOINTMENT (OUTPATIENT)
Dept: CARDIOLOGY | Facility: HOSPITAL | Age: 59
End: 2024-06-26

## 2024-06-26 ENCOUNTER — NON-APPOINTMENT (OUTPATIENT)
Age: 59
End: 2024-06-26

## 2024-06-26 ENCOUNTER — OUTPATIENT (OUTPATIENT)
Dept: OUTPATIENT SERVICES | Facility: HOSPITAL | Age: 59
LOS: 1 days | End: 2024-06-26
Payer: SELF-PAY

## 2024-06-26 VITALS
HEART RATE: 78 BPM | SYSTOLIC BLOOD PRESSURE: 112 MMHG | OXYGEN SATURATION: 96 % | HEIGHT: 64 IN | BODY MASS INDEX: 29.88 KG/M2 | DIASTOLIC BLOOD PRESSURE: 65 MMHG | WEIGHT: 175 LBS

## 2024-06-26 DIAGNOSIS — Z98.89 OTHER SPECIFIED POSTPROCEDURAL STATES: Chronic | ICD-10-CM

## 2024-06-26 DIAGNOSIS — Z90.710 ACQUIRED ABSENCE OF BOTH CERVIX AND UTERUS: Chronic | ICD-10-CM

## 2024-06-26 DIAGNOSIS — R07.9 CHEST PAIN, UNSPECIFIED: ICD-10-CM

## 2024-06-26 DIAGNOSIS — I25.10 ATHEROSCLEROTIC HEART DISEASE OF NATIVE CORONARY ARTERY WITHOUT ANGINA PECTORIS: ICD-10-CM

## 2024-06-26 DIAGNOSIS — Z98.51 TUBAL LIGATION STATUS: Chronic | ICD-10-CM

## 2024-06-26 DIAGNOSIS — Z98.890 OTHER SPECIFIED POSTPROCEDURAL STATES: Chronic | ICD-10-CM

## 2024-06-26 PROCEDURE — 93005 ELECTROCARDIOGRAM TRACING: CPT

## 2024-06-26 PROCEDURE — G0463: CPT

## 2024-06-27 DIAGNOSIS — R07.9 CHEST PAIN, UNSPECIFIED: ICD-10-CM

## 2024-07-16 ENCOUNTER — RESULT REVIEW (OUTPATIENT)
Age: 59
End: 2024-07-16

## 2024-07-17 ENCOUNTER — APPOINTMENT (OUTPATIENT)
Dept: CV DIAGNOSTICS | Facility: HOSPITAL | Age: 59
End: 2024-07-17

## 2024-07-17 ENCOUNTER — APPOINTMENT (OUTPATIENT)
Dept: CV DIAGNOSITCS | Facility: HOSPITAL | Age: 59
End: 2024-07-17

## 2024-07-17 ENCOUNTER — RESULT REVIEW (OUTPATIENT)
Age: 59
End: 2024-07-17

## 2024-07-17 ENCOUNTER — OUTPATIENT (OUTPATIENT)
Dept: OUTPATIENT SERVICES | Facility: HOSPITAL | Age: 59
LOS: 1 days | End: 2024-07-17
Payer: SELF-PAY

## 2024-07-17 DIAGNOSIS — Z90.710 ACQUIRED ABSENCE OF BOTH CERVIX AND UTERUS: Chronic | ICD-10-CM

## 2024-07-17 DIAGNOSIS — Z98.89 OTHER SPECIFIED POSTPROCEDURAL STATES: Chronic | ICD-10-CM

## 2024-07-17 DIAGNOSIS — Z98.890 OTHER SPECIFIED POSTPROCEDURAL STATES: Chronic | ICD-10-CM

## 2024-07-17 DIAGNOSIS — Z98.51 TUBAL LIGATION STATUS: Chronic | ICD-10-CM

## 2024-07-17 DIAGNOSIS — R07.9 CHEST PAIN, UNSPECIFIED: ICD-10-CM

## 2024-07-17 PROCEDURE — 93016 CV STRESS TEST SUPVJ ONLY: CPT

## 2024-07-17 PROCEDURE — 93356 MYOCRD STRAIN IMG SPCKL TRCK: CPT

## 2024-07-17 PROCEDURE — 93018 CV STRESS TEST I&R ONLY: CPT

## 2024-07-17 PROCEDURE — 93017 CV STRESS TEST TRACING ONLY: CPT

## 2024-07-17 PROCEDURE — 93306 TTE W/DOPPLER COMPLETE: CPT

## 2024-07-17 PROCEDURE — 93306 TTE W/DOPPLER COMPLETE: CPT | Mod: 26

## 2024-07-24 ENCOUNTER — APPOINTMENT (OUTPATIENT)
Dept: INTERNAL MEDICINE | Facility: CLINIC | Age: 59
End: 2024-07-24
Payer: COMMERCIAL

## 2024-07-24 ENCOUNTER — NON-APPOINTMENT (OUTPATIENT)
Age: 59
End: 2024-07-24

## 2024-07-24 VITALS
SYSTOLIC BLOOD PRESSURE: 114 MMHG | WEIGHT: 181 LBS | OXYGEN SATURATION: 99 % | DIASTOLIC BLOOD PRESSURE: 80 MMHG | HEART RATE: 89 BPM | BODY MASS INDEX: 30.9 KG/M2 | HEIGHT: 64 IN

## 2024-07-24 DIAGNOSIS — K21.9 GASTRO-ESOPHAGEAL REFLUX DISEASE W/OUT ESOPHAGITIS: ICD-10-CM

## 2024-07-24 DIAGNOSIS — E66.9 OBESITY, UNSPECIFIED: ICD-10-CM

## 2024-07-24 PROCEDURE — 99215 OFFICE O/P EST HI 40 MIN: CPT | Mod: GC

## 2024-07-26 NOTE — HISTORY OF PRESENT ILLNESS
[FreeTextEntry1] : follow for medical clearance for EGD after episode of chest pain and pending TTE and stress test.  [de-identified] : 58-year-old female with PMH of HDL, PreDM, constipation, gallstones, following up today after being seen cardiology for medical clearance for EGD iso new chest pain a couple weeks ago.   used    She has already had an appointment with cardiology who referred her for TTE and stress test which she has completed. She is no longer having chest pain. She was prescribed a course of famotidine, which she took for 3 weeks that helped her chest pain. She would like a refill of that today. She has not taken the famotidine for 2 weeks. Since stopping she has not felt the chest pain again but has had one episode of reflux related epigastric pain after eating lunch, that self-resolved. She asks if she is now medically cleared to return to GI for EGD because she was unable to schedule yesterday because of pending cardiac tests. She asks if GI doctors will also fix the hernia when they do the EGD  Preferred pharmacy - [facility] in [facility]

## 2024-07-26 NOTE — HISTORY OF PRESENT ILLNESS
[FreeTextEntry1] : follow for medical clearance for EGD after episode of chest pain and pending TTE and stress test.  [de-identified] : 58-year-old female with PMH of HDL, PreDM, constipation, gallstones, following up today after being seen cardiology for medical clearance for EGD iso new chest pain a couple weeks ago.   used    She has already had an appointment with cardiology who referred her for TTE and stress test which she has completed. She is no longer having chest pain. She was prescribed a course of famotidine, which she took for 3 weeks that helped her chest pain. She would like a refill of that today. She has not taken the famotidine for 2 weeks. Since stopping she has not felt the chest pain again but has had one episode of reflux related epigastric pain after eating lunch, that self-resolved. She asks if she is now medically cleared to return to GI for EGD because she was unable to schedule yesterday because of pending cardiac tests. She asks if GI doctors will also fix the hernia when they do the EGD  Preferred pharmacy - [facility] in [facility]

## 2024-07-31 NOTE — PHYSICAL EXAM
[Clear to Auscultation] : lungs were clear to auscultation bilaterally [Normal Rate] : normal rate  [Regular Rhythm] : with a regular rhythm [Normal S1, S2] : normal S1 and S2 [Soft] : abdomen soft [Non-distended] : non-distended [Normal Bowel Sounds] : normal bowel sounds [de-identified] : periumbilcal tenderness to deep palpation

## 2024-07-31 NOTE — END OF VISIT
[] : Resident [FreeTextEntry3] : History as outlined and cleared for EGD by cardiology with testing as outlined Will notify GI and also have them evaluate the hiatal hernia seen on MRI [Time Spent: ___ minutes] : I have spent [unfilled] minutes of time on the encounter.

## 2024-07-31 NOTE — END OF VISIT
[FreeTextEntry3] : History as outlined and cleared for EGD by cardiology with testing as outlined Will notify GI and also have them evaluate the hiatal hernia seen on MRI [] : Resident [Time Spent: ___ minutes] : I have spent [unfilled] minutes of time on the encounter.

## 2024-07-31 NOTE — HISTORY OF PRESENT ILLNESS
[de-identified] : 58-year-old female with PMH of HDL, PreDM, constipation, gallstones, following up today after being seen cardiology for medical clearance for EGD iso new chest pain a couple weeks ago.   used    She has already had an appointment with cardiology who referred her for TTE and stress test which she has completed. She is no longer having chest pain. She was prescribed a course of famotidine, which she took for 3 weeks that helped her chest pain. She would like a refill of that today. She has not taken the famotidine for 2 weeks. Since stopping she has not felt the chest pain again but has had one episode of reflux related epigastric pain after eating lunch, that self-resolved. She asks if she is now medically cleared to return to GI for EGD because she was unable to schedule yesterday because of pending cardiac tests. She asks if GI doctors will also fix the hernia when they do the EGD  Preferred pharmacy - [facility] in [facility]     [FreeTextEntry1] : follow for medical clearance for EGD after episode of chest pain and pending TTE and stress test.

## 2024-07-31 NOTE — HISTORY OF PRESENT ILLNESS
Chief Complaint   Patient presents with     Blood Draw     Pt is here for vitals and Lab draw which is done by [facility]     Edelmira Orozco MA     [de-identified] : 58-year-old female with PMH of HDL, PreDM, constipation, gallstones, following up today after being seen cardiology for medical clearance for EGD iso new chest pain a couple weeks ago.   used    She has already had an appointment with cardiology who referred her for TTE and stress test which she has completed. She is no longer having chest pain. She was prescribed a course of famotidine, which she took for 3 weeks that helped her chest pain. She would like a refill of that today. She has not taken the famotidine for 2 weeks. Since stopping she has not felt the chest pain again but has had one episode of reflux related epigastric pain after eating lunch, that self-resolved. She asks if she is now medically cleared to return to GI for EGD because she was unable to schedule yesterday because of pending cardiac tests. She asks if GI doctors will also fix the hernia when they do the EGD  Preferred pharmacy - [facility] in [facility]     [FreeTextEntry1] : follow for medical clearance for EGD after episode of chest pain and pending TTE and stress test.

## 2024-07-31 NOTE — PHYSICAL EXAM
[Clear to Auscultation] : lungs were clear to auscultation bilaterally [Normal Rate] : normal rate  [Regular Rhythm] : with a regular rhythm [Normal S1, S2] : normal S1 and S2 [Soft] : abdomen soft [Non-distended] : non-distended [Normal Bowel Sounds] : normal bowel sounds [de-identified] : periumbilcal tenderness to deep palpation

## 2024-07-31 NOTE — REVIEW OF SYSTEMS
[Recent Change In Weight] : ~T recent weight change [Chest Pain] : chest pain [Heartburn] : heartburn [Abdominal Pain] : no abdominal pain [Nausea] : no nausea [Constipation] : no constipation [Diarrhea] : diarrhea [Vomiting] : no vomiting [Melena] : no melena [FreeTextEntry2] : 6lb increase [FreeTextEntry5] : see hpi-gerd

## 2024-08-13 ENCOUNTER — NON-APPOINTMENT (OUTPATIENT)
Age: 59
End: 2024-08-13

## 2024-08-14 NOTE — HISTORY OF PRESENT ILLNESS
[FreeTextEntry1] : 57 yo woman with history of HLD, hepatic steatosis, cholelithiasis, L thyroid lobectomy, JALEESA, preDM, and obesity who was referred to Cardiology for evaluation of chest pain.  Exercise stress test done. Patient able to perform 10METs and no ischemic changes on EKG.  TTE with normal LV function and normal valves.   There are no contraindications to proceeding with GI evaluation. No other cardiac testing needed.   Marcos Gant MD Cardiology Fellow

## 2024-09-30 ENCOUNTER — APPOINTMENT (OUTPATIENT)
Dept: INTERNAL MEDICINE | Facility: CLINIC | Age: 59
End: 2024-09-30

## 2024-09-30 ENCOUNTER — OUTPATIENT (OUTPATIENT)
Dept: OUTPATIENT SERVICES | Facility: HOSPITAL | Age: 59
LOS: 1 days | End: 2024-09-30
Payer: SELF-PAY

## 2024-09-30 VITALS
HEIGHT: 63 IN | DIASTOLIC BLOOD PRESSURE: 80 MMHG | BODY MASS INDEX: 31.89 KG/M2 | WEIGHT: 180 LBS | SYSTOLIC BLOOD PRESSURE: 130 MMHG | OXYGEN SATURATION: 99 % | HEART RATE: 70 BPM

## 2024-09-30 DIAGNOSIS — I10 ESSENTIAL (PRIMARY) HYPERTENSION: ICD-10-CM

## 2024-09-30 DIAGNOSIS — Z98.51 TUBAL LIGATION STATUS: Chronic | ICD-10-CM

## 2024-09-30 DIAGNOSIS — Z90.710 ACQUIRED ABSENCE OF BOTH CERVIX AND UTERUS: Chronic | ICD-10-CM

## 2024-09-30 DIAGNOSIS — Z00.00 ENCOUNTER FOR GENERAL ADULT MEDICAL EXAMINATION W/OUT ABNORMAL FINDINGS: ICD-10-CM

## 2024-09-30 DIAGNOSIS — Z98.89 OTHER SPECIFIED POSTPROCEDURAL STATES: Chronic | ICD-10-CM

## 2024-09-30 DIAGNOSIS — M54.9 DORSALGIA, UNSPECIFIED: ICD-10-CM

## 2024-09-30 PROCEDURE — 85027 COMPLETE CBC AUTOMATED: CPT

## 2024-09-30 PROCEDURE — G0463: CPT

## 2024-09-30 PROCEDURE — 83036 HEMOGLOBIN GLYCOSYLATED A1C: CPT

## 2024-09-30 PROCEDURE — 80053 COMPREHEN METABOLIC PANEL: CPT

## 2024-09-30 PROCEDURE — 99396 PREV VISIT EST AGE 40-64: CPT | Mod: GC

## 2024-09-30 PROCEDURE — 80061 LIPID PANEL: CPT

## 2024-09-30 RX ORDER — MELOXICAM 7.5 MG/1
7.5 TABLET ORAL DAILY
Qty: 60 | Refills: 1 | Status: ACTIVE | COMMUNITY
Start: 2024-09-30 | End: 1900-01-01

## 2024-09-30 NOTE — PHYSICAL EXAM
[No Acute Distress] : no acute distress [PERRL] : pupils equal round and reactive to light [Normal Oropharynx] : the oropharynx was normal [Clear to Auscultation] : lungs were clear to auscultation bilaterally [Regular Rhythm] : with a regular rhythm [Normal S1, S2] : normal S1 and S2 [Non Tender] : non-tender [No HSM] : no HSM [Normal Bowel Sounds] : normal bowel sounds [No Joint Swelling] : no joint swelling [de-identified] : pain to palpation of the paraspinal muscles of upper thorasic bacl

## 2024-09-30 NOTE — HEALTH RISK ASSESSMENT
[No] : No [No falls in past year] : Patient reported no falls in the past year [Never] : Never [[id number]] : 0

## 2024-09-30 NOTE — HISTORY OF PRESENT ILLNESS
[de-identified] : 59 year old female with PMH of HDL, pre DM, constipation gall stones, hiatal hernia presenting for annual visit patient received text from GI doctors to schedule a pre surgery screening visit and confirmed that her EGD is on the 9th of cotober  #hiatal hernia She got a text for a pre op appointment on the 2nd for a scheduled EGD on the 8th. Patient encouraged to follow up with both of hose visits as scheduled   #Back Pain  Previously had neck pain that resolved with exercises and symptomatic management. Now she endorses a tight achey pain in her upper back relieved by NSAID, Tylenol and hot and cold pack. She is also relived by a massage gun. Pain does not have a specific trigger and is not worsened by work or specific movement. Does not interfere with her daily life and denies new numbness or tingling   #HCM Last colonoscopy was 2 years ago and told she does not need another for 10 years  Pap smear - patient has hysterectomy  shingles, flu and covid - denied all vaccinations for the moment  labs - agreeable to routine lab work

## 2024-09-30 NOTE — REVIEW OF SYSTEMS
[Fever] : no fever Statement Selected [Night Sweats] : no night sweats [Discharge] : no discharge [Earache] : no earache [Chest Pain] : no chest pain [Abdominal Pain] : no abdominal pain [Back Pain] : back pain [Headache] : no headache [Memory Loss] : no memory loss

## 2024-09-30 NOTE — PHYSICAL EXAM
[No Acute Distress] : no acute distress [PERRL] : pupils equal round and reactive to light [Normal Oropharynx] : the oropharynx was normal [Clear to Auscultation] : lungs were clear to auscultation bilaterally [Regular Rhythm] : with a regular rhythm [Normal S1, S2] : normal S1 and S2 [Non Tender] : non-tender [No HSM] : no HSM [Normal Bowel Sounds] : normal bowel sounds [No Joint Swelling] : no joint swelling [de-identified] : pain to palpation of the paraspinal muscles of upper thorasic bacl

## 2024-09-30 NOTE — ASSESSMENT
[FreeTextEntry1] : 59 year old woman with PMH of pre diabetes, hiatal hernia, HTN presenting for follow up visit with new back pain and upcoming EGD procedure    #Back pain  Conservative management with Tylenol NSAIDS, ice packs, hot packs, stretching  Meloxicam 7.5 BID PRNs PT referral  #Hiatal Hernia  EGD preop appointment 10/1 EGD 10/9 5 week telephonic follow up   #HCM CBC, CMP, A1C, Lipid panel

## 2024-09-30 NOTE — HISTORY OF PRESENT ILLNESS
[de-identified] : 59 year old female with PMH of HDL, pre DM, constipation gall stones, hiatal hernia presenting for annual visit patient received text from GI doctors to schedule a pre surgery screening visit and confirmed that her EGD is on the 9th of cotober  #hiatal hernia She got a text for a pre op appointment on the 2nd for a scheduled EGD on the 8th. Patient encouraged to follow up with both of hose visits as scheduled   #Back Pain  Previously had neck pain that resolved with exercises and symptomatic management. Now she endorses a tight achey pain in her upper back relieved by NSAID, Tylenol and hot and cold pack. She is also relived by a massage gun. Pain does not have a specific trigger and is not worsened by work or specific movement. Does not interfere with her daily life and denies new numbness or tingling   #HCM Last colonoscopy was 2 years ago and told she does not need another for 10 years  Pap smear - patient has hysterectomy  shingles, flu and covid - denied all vaccinations for the moment  labs - agreeable to routine lab work

## 2024-09-30 NOTE — HEALTH RISK ASSESSMENT
[No] : No [No falls in past year] : Patient reported no falls in the past year [Never] : Never [[id number]] : 0

## 2024-10-01 LAB
ALBUMIN SERPL ELPH-MCNC: 4.7 G/DL
ALP BLD-CCNC: 105 U/L
ALT SERPL-CCNC: 21 U/L
ANION GAP SERPL CALC-SCNC: 12 MMOL/L
AST SERPL-CCNC: 19 U/L
BILIRUB SERPL-MCNC: 0.3 MG/DL
BUN SERPL-MCNC: 10 MG/DL
CALCIUM SERPL-MCNC: 10 MG/DL
CHLORIDE SERPL-SCNC: 104 MMOL/L
CHOLEST SERPL-MCNC: 250 MG/DL
CO2 SERPL-SCNC: 27 MMOL/L
CREAT SERPL-MCNC: 0.56 MG/DL
EGFR: 106 ML/MIN/1.73M2
ESTIMATED AVERAGE GLUCOSE: 123 MG/DL
GLUCOSE SERPL-MCNC: 88 MG/DL
HBA1C MFR BLD HPLC: 5.9 %
HCT VFR BLD CALC: 45.3 %
HDLC SERPL-MCNC: 52 MG/DL
HGB BLD-MCNC: 14.3 G/DL
LDLC SERPL CALC-MCNC: 162 MG/DL
MCHC RBC-ENTMCNC: 28.9 PG
MCHC RBC-ENTMCNC: 31.6 GM/DL
MCV RBC AUTO: 91.5 FL
NONHDLC SERPL-MCNC: 198 MG/DL
PLATELET # BLD AUTO: 216 K/UL
POTASSIUM SERPL-SCNC: 4.3 MMOL/L
PROT SERPL-MCNC: 8.1 G/DL
RBC # BLD: 4.95 M/UL
RBC # FLD: 12.5 %
SODIUM SERPL-SCNC: 143 MMOL/L
TRIGL SERPL-MCNC: 195 MG/DL
WBC # FLD AUTO: 4.21 K/UL

## 2024-10-15 ENCOUNTER — OUTPATIENT (OUTPATIENT)
Dept: OUTPATIENT SERVICES | Facility: HOSPITAL | Age: 59
LOS: 1 days | End: 2024-10-15
Payer: SELF-PAY

## 2024-10-15 VITALS
HEART RATE: 81 BPM | SYSTOLIC BLOOD PRESSURE: 124 MMHG | WEIGHT: 171.96 LBS | RESPIRATION RATE: 16 BRPM | DIASTOLIC BLOOD PRESSURE: 90 MMHG | TEMPERATURE: 97 F | HEIGHT: 63.39 IN | OXYGEN SATURATION: 99 %

## 2024-10-15 DIAGNOSIS — Z90.710 ACQUIRED ABSENCE OF BOTH CERVIX AND UTERUS: Chronic | ICD-10-CM

## 2024-10-15 DIAGNOSIS — R10.13 EPIGASTRIC PAIN: ICD-10-CM

## 2024-10-15 DIAGNOSIS — Z98.890 OTHER SPECIFIED POSTPROCEDURAL STATES: Chronic | ICD-10-CM

## 2024-10-15 DIAGNOSIS — Z98.51 TUBAL LIGATION STATUS: Chronic | ICD-10-CM

## 2024-10-15 DIAGNOSIS — Z01.818 ENCOUNTER FOR OTHER PREPROCEDURAL EXAMINATION: ICD-10-CM

## 2024-10-15 DIAGNOSIS — Z98.89 OTHER SPECIFIED POSTPROCEDURAL STATES: Chronic | ICD-10-CM

## 2024-10-15 PROCEDURE — G0463: CPT

## 2024-10-15 NOTE — H&P PST ADULT - ASSESSMENT
DASI score: 9.89  DASI activity: Pt states she can walk ~ 40 mindaily, can climb 2-3 FOS w/out s/s of C/P or SOB  Loose teeth or denture: upper partial bridge

## 2024-10-15 NOTE — H&P PST ADULT - PROBLEM SELECTOR PLAN 1
scheduled EGD on 10/23/2024  -preop instructions given  labs from [employer] in chart dated 9/30/24  c/w famotidine

## 2024-10-15 NOTE — H&P PST ADULT - NSICDXPASTSURGICALHX_GEN_ALL_CORE_FT
PAST SURGICAL HISTORY:  H/O abdominal hysterectomy 2016    H/O hand surgery right hand with screws    S/P breast biopsy, left 3/18 ; pt states " benign "    S/P thyroid surgery     S/P tubal ligation 24 yrs ago

## 2024-10-15 NOTE — H&P PST ADULT - NSICDXPASTMEDICALHX_GEN_ALL_CORE_FT
PAST MEDICAL HISTORY:  Benign breast lumps, left     HTN (hypertension) pt states recently noted ; pt denies rx    Hypercholesterolemia     Iron deficiency anemia     Thyroid nodule left    Uterine leiomyoma

## 2024-10-15 NOTE — H&P PST ADULT - HISTORY OF PRESENT ILLNESS
59 yr old [place]isian speaking female with PMH of thyroid nodule s/p thyroid surgery, HTN, uterine fibroids s/p hysterectomy 2016, Iron deficiency, hepatic steatosis, s/p MRCP. cholelithiasis, hiatal hernia, Pt reports ~>5 months of intermittent epigastric pain, with imaging revealing gallstones, hiatal hernia. Pt evaluated by Dr. Gallagher for a scheduled EGD on 10/23/2024. Pt denies fever, chills, h/a, c/p or SOB at this time.

## 2024-10-23 ENCOUNTER — OUTPATIENT (OUTPATIENT)
Dept: OUTPATIENT SERVICES | Facility: HOSPITAL | Age: 59
LOS: 1 days | End: 2024-10-23
Payer: SELF-PAY

## 2024-10-23 ENCOUNTER — RESULT REVIEW (OUTPATIENT)
Age: 59
End: 2024-10-23

## 2024-10-23 ENCOUNTER — TRANSCRIPTION ENCOUNTER (OUTPATIENT)
Age: 59
End: 2024-10-23

## 2024-10-23 VITALS
OXYGEN SATURATION: 99 % | TEMPERATURE: 98 F | HEART RATE: 80 BPM | SYSTOLIC BLOOD PRESSURE: 162 MMHG | HEIGHT: 65 IN | DIASTOLIC BLOOD PRESSURE: 85 MMHG | RESPIRATION RATE: 19 BRPM | WEIGHT: 175.05 LBS

## 2024-10-23 VITALS
SYSTOLIC BLOOD PRESSURE: 117 MMHG | OXYGEN SATURATION: 95 % | HEART RATE: 69 BPM | DIASTOLIC BLOOD PRESSURE: 63 MMHG | RESPIRATION RATE: 19 BRPM

## 2024-10-23 DIAGNOSIS — Z98.51 TUBAL LIGATION STATUS: Chronic | ICD-10-CM

## 2024-10-23 DIAGNOSIS — Z98.890 OTHER SPECIFIED POSTPROCEDURAL STATES: Chronic | ICD-10-CM

## 2024-10-23 DIAGNOSIS — R10.13 EPIGASTRIC PAIN: ICD-10-CM

## 2024-10-23 DIAGNOSIS — Z90.710 ACQUIRED ABSENCE OF BOTH CERVIX AND UTERUS: Chronic | ICD-10-CM

## 2024-10-23 DIAGNOSIS — Z98.89 OTHER SPECIFIED POSTPROCEDURAL STATES: Chronic | ICD-10-CM

## 2024-10-23 PROCEDURE — 43239 EGD BIOPSY SINGLE/MULTIPLE: CPT

## 2024-10-23 PROCEDURE — 43239 EGD BIOPSY SINGLE/MULTIPLE: CPT | Mod: GC

## 2024-10-23 PROCEDURE — 88305 TISSUE EXAM BY PATHOLOGIST: CPT

## 2024-10-23 PROCEDURE — 88305 TISSUE EXAM BY PATHOLOGIST: CPT | Mod: 26

## 2024-10-23 RX ORDER — FAMOTIDINE 40 MG
1 TABLET ORAL
Refills: 0 | DISCHARGE

## 2024-10-23 NOTE — PRE PROCEDURE NOTE - PRE PROCEDURE EVALUATION
Attending Physician: Dr. Gallagher                            Procedure: EGD    Indication for Procedure: epigastric pain  ________________________________________________________  PAST MEDICAL & SURGICAL HISTORY:  Uterine leiomyoma      Iron deficiency anemia      Benign breast lumps, left      HTN (hypertension)  pt states recently noted ; pt denies rx      Thyroid nodule  left      Hypercholesterolemia      S/P tubal ligation  24 yrs ago      S/P breast biopsy, left  3/18 ; pt states " benign "      H/O abdominal hysterectomy  2016      H/O hand surgery  right hand with screws      S/P thyroid surgery        ALLERGIES:  lactose (Diarrhea)  No Known Allergies    HOME MEDICATIONS:  famotidine 20 mg oral tablet: 1 tab(s) orally every 12 hours    AICD/PPM: [ ] yes   [X] no    PERTINENT LAB DATA:                      PHYSICAL EXAMINATION:  GENERAL:  No acute distress  HEENT:  NCAT, no scleral icterus  CHEST: no resp distress  HEART:  RRR  ABDOMEN:  Soft, non-tender, non-distended, no masses  EXTREMITIES:  No cyanosis, clubbing, or edema  SKIN:  No rash/erythema/ecchymoses/petechiae/wounds/abscess/warm/dry  NEURO:  Alert and oriented x 3, no asterixis, no tremor        COMMENTS:  Discussed with patient/HCP risks of endoscopy/colonoscopy which include but are not limited to: risks of perforation, infection and bleeding. Risks also include risks of missed lesions. Patient/HCP would like to proceed at this time.     The patient is a suitable candidate for the planned procedure unless box checked [ ]  No, explain:       Attending Physician: Dr. Gallagher                            Procedure: EGD    Indication for Procedure: epigastric pain  ________________________________________________________  PAST MEDICAL & SURGICAL HISTORY:  Uterine leiomyoma      Iron deficiency anemia      Benign breast lumps, left      HTN (hypertension)  pt states recently noted ; pt denies rx      Thyroid nodule  left      Hypercholesterolemia      S/P tubal ligation  24 yrs ago      S/P breast biopsy, left  3/18 ; pt states " benign "      H/O abdominal hysterectomy  2016      H/O hand surgery  right hand with screws      S/P thyroid surgery        ALLERGIES:  lactose (Diarrhea)  No Known Allergies    HOME MEDICATIONS:  famotidine 20 mg oral tablet: 1 tab(s) orally every 12 hours    AICD/PPM: [ ] yes   [X] no    PERTINENT LAB DATA:                      PHYSICAL EXAMINATION:  GENERAL:  No acute distress  HEENT:  NCAT, no scleral icterus  CHEST: no resp distress  HEART:  RRR  ABDOMEN:  Soft, non-tender, non-distended, no masses  EXTREMITIES:  No cyanosis, clubbing, or edema  SKIN:  No rash/erythema/ecchymoses/petechiae/wounds/abscess/warm/dry  NEURO:  Alert and oriented x 3, no asterixis, no tremor        COMMENTS:  Discussed with patient/HCP risks of endoscopy/colonoscopy which include but are not limited to: risks of perforation, infection and bleeding. Risks also include risks of missed lesions. Patient/HCP would like to proceed at this time.     Patient asked whether student would be doing procedure. Discussed with patient that provider is fellow physician (training physician) but has done this particular procedure many times prior and would be doing procedure. Patient with no further objections, accepting of fellow.      The patient is a suitable candidate for the planned procedure unless box checked [ ]  No, explain:

## 2024-10-23 NOTE — ASU PATIENT PROFILE, ADULT - FALL HARM RISK - PT AGE POPULATION HIDDEN
Referred by: Stephon Mckeon MD; Medical Diagnosis (from order):    Diagnosis Information      Diagnosis    S63.41 (ICD-10-CM) - Traumatic rupture of collateral ligament of finger at metacarpophalangeal and interphalangeal joint                Daily Treatment Note    Visit:  6     SUBJECTIVE                                                                                                             Pt indicates the doctor reports he can start strengthening.      OBJECTIVE                                                                                                                      Strength  (out of 5 unless noted, standard test position unless noted, lbs tested with hand held dynamometer)   : (lbs)    - Neutral:        • Left: Trial(s): 115, 115, 115, Average: 115        • Right: Trial(s): 82, 82, 82, Average: 82  Pinch: (lbs)    - Lateral:         • Left: Trial(s): 28, 28, 28, Average: 28        • Right: Trial(s): 27, 27, 27, Average: 27    - 3 Point:         • Left: Trial(s) 21, 21, 21, Average: 21        • Right: Trial(s): 20, 20, 20, Average: 20       TREATMENT                                                                                                                  Therapeutic Activity:  Blue power web    Green flexbar twist and bend  Blue t-putty , roll pinch and pull    Skilled input: verbal instruction/cues    Home Exercise Program: Blue t-putty , roll pinch and pull (vended blue putty)    Fluidotherapy (88163)  Location: R UE  Position: sitting  Temperature: 115° F  Duration: 10 minutes  Ultrasound (41712)  Location: R incisions  Duty Cycle: 100%  Frequency: 1 Mhz  Intensity (w/cm2): 1.2  Duration: 8 minutes  Results: tissue softening      ASSESSMENT                                                                                                             Pt indicates the doctor reports he can start strengthening and playing a little basketball.  Initial strength measurements taken  today for  and pinch. Affected hand is weaker than left/non dominant hand. OT vended blue t-putty to further progress strength and Add. New Gel vended and D tubigrip to secure.  Pain/symptoms after session: 0           Therapy procedure time and total treatment time can be found documented on the Time Entry flowsheet   Adult

## 2024-10-23 NOTE — ASU PREOP CHECKLIST - HEART RATE (BEATS/MIN)
Epidural Block    Performed by: Nuria Schreiber MD  Authorized by: Nuria Schreiber MD     Pre-Procedure  Indication: labor epidural    Preanesthetic Checklist: patient identified, risks and benefits discussed, anesthesia consent, site marked, patient being monitored, timeout performed and anesthesia consent      Epidural:   Patient position:  Seated  Prep region:  Lumbar  Prep: Chlorhexidine    Location:  L2-3    Needle and Epidural Catheter:   Needle Type:  Tuohy  Needle Gauge:  17 G  Injection Technique:  Loss of resistance using saline  Attempts:  1  Catheter Size:  19 G  Events: no blood with aspiration, no cerebrospinal fluid with aspiration, no paresthesia and negative aspiration test    Test Dose:  Bupivacaine 0.25% and negative    Assessment:   Catheter Secured:  Tegaderm and tape  Insertion:  Uncomplicated  Patient tolerance:  Patient tolerated the procedure well with no immediate complications
80

## 2024-10-23 NOTE — ASU DISCHARGE PLAN (ADULT/PEDIATRIC) - NS MD DC FALL RISK RISK
For information on Fall & Injury Prevention, visit: [email or web address] OR  [email or web address] OR  https://www.cdc.gov/steadi/patient.html

## 2024-10-23 NOTE — ASU DISCHARGE PLAN (ADULT/PEDIATRIC) - FINANCIAL ASSISTANCE
[facility] provides services at a reduced cost to those who are determined to be eligible through [facility]’s financial assistance program. Information regarding [facility]’s financial assistance program can be found by going to [email or web address] or by calling [phone].

## 2024-10-26 LAB — SURGICAL PATHOLOGY STUDY: SIGNIFICANT CHANGE UP

## 2024-11-01 ENCOUNTER — NON-APPOINTMENT (OUTPATIENT)
Age: 59
End: 2024-11-01

## 2024-12-09 ENCOUNTER — APPOINTMENT (OUTPATIENT)
Dept: INTERNAL MEDICINE | Facility: CLINIC | Age: 59
End: 2024-12-09
Payer: COMMERCIAL

## 2024-12-09 ENCOUNTER — OUTPATIENT (OUTPATIENT)
Dept: OUTPATIENT SERVICES | Facility: HOSPITAL | Age: 59
LOS: 1 days | End: 2024-12-09
Payer: SELF-PAY

## 2024-12-09 VITALS
SYSTOLIC BLOOD PRESSURE: 112 MMHG | WEIGHT: 178 LBS | OXYGEN SATURATION: 96 % | DIASTOLIC BLOOD PRESSURE: 78 MMHG | HEART RATE: 87 BPM | BODY MASS INDEX: 31.53 KG/M2

## 2024-12-09 DIAGNOSIS — Z98.51 TUBAL LIGATION STATUS: Chronic | ICD-10-CM

## 2024-12-09 DIAGNOSIS — R73.09 OTHER ABNORMAL GLUCOSE: ICD-10-CM

## 2024-12-09 DIAGNOSIS — I10 ESSENTIAL (PRIMARY) HYPERTENSION: ICD-10-CM

## 2024-12-09 DIAGNOSIS — Z90.710 ACQUIRED ABSENCE OF BOTH CERVIX AND UTERUS: Chronic | ICD-10-CM

## 2024-12-09 DIAGNOSIS — Z98.890 OTHER SPECIFIED POSTPROCEDURAL STATES: Chronic | ICD-10-CM

## 2024-12-09 DIAGNOSIS — K29.50 UNSPECIFIED CHRONIC GASTRITIS W/OUT BLEEDING: ICD-10-CM

## 2024-12-09 DIAGNOSIS — Z98.89 OTHER SPECIFIED POSTPROCEDURAL STATES: Chronic | ICD-10-CM

## 2024-12-09 PROCEDURE — 99213 OFFICE O/P EST LOW 20 MIN: CPT | Mod: GC

## 2024-12-09 PROCEDURE — G0463: CPT

## 2024-12-09 RX ORDER — PANTOPRAZOLE 40 MG/1
40 TABLET, DELAYED RELEASE ORAL DAILY
Qty: 60 | Refills: 0 | Status: ACTIVE | COMMUNITY
Start: 2024-12-09 | End: 1900-01-01

## 2024-12-16 DIAGNOSIS — R73.09 OTHER ABNORMAL GLUCOSE: ICD-10-CM

## 2024-12-16 DIAGNOSIS — K29.50 UNSPECIFIED CHRONIC GASTRITIS WITHOUT BLEEDING: ICD-10-CM

## 2025-02-10 NOTE — HISTORY OF PRESENT ILLNESS
Patient is currently taking brand-name Synthroid 150 mcg daily last TSH done in the month of October came back normal at 0.830 we will continue with the same dose for now      [Chills] : no chills [Fever] : no fever [Nausea] : no nausea [Vomiting] : no vomiting [Clean/Dry/Intact] : clean, dry and intact [Healed] : healed [Erythema] : not erythematous [Neuro Intact] : an unremarkable neurological exam [Vascular Intact] : ~T peripheral vascular exam normal [Negative Davi's] : maneuvers demonstrated a negative Davi's sign [Doing Well] : is doing well [Excellent Pain Control] : has excellent pain control [No Sign of Infection] : is showing no signs of infection [None] : None [de-identified] : Physical Exam of the  RT wrist:  There is no erythema or drainage there are no signs of skin breakdown or infection. ROM Patient  can actively make a fist,t . The ROM of the RT wrist is 50 degrees wrist extension and 40  degrees wrist flexion. there is 90 degrees of pronation and supination. There is a normal neurovascular exam and normal capillary refill.   [de-identified] : 54 yo F ritika buckley presents S/P ORIF right distal radius fracture 1/10/19.  She states that her function is improving.  She is participating in aggressive therapy [de-identified] : S/P ORIF right distal radius fracture 1/10/19  [de-identified] : 3 views of the right wrist taken today and reviewed by me show well fixed right distal radius fracture with hardware in good position no signs of mechanical failure. The fracture appears fully healed [de-identified] : S/P ORIF right distal radius fracture 1/10/19 \par - She is making progress, but slowly.  She is encouraged to keep up w her rehabilitation program.\par - hand therapy \par - F/U in 8 weeks with XR\par

## 2025-02-19 ENCOUNTER — APPOINTMENT (OUTPATIENT)
Dept: INTERNAL MEDICINE | Facility: CLINIC | Age: 60
End: 2025-02-19

## 2025-02-19 ENCOUNTER — OUTPATIENT (OUTPATIENT)
Dept: OUTPATIENT SERVICES | Facility: HOSPITAL | Age: 60
LOS: 1 days | End: 2025-02-19

## 2025-02-19 DIAGNOSIS — Z98.890 OTHER SPECIFIED POSTPROCEDURAL STATES: Chronic | ICD-10-CM

## 2025-02-19 DIAGNOSIS — Z98.89 OTHER SPECIFIED POSTPROCEDURAL STATES: Chronic | ICD-10-CM

## 2025-02-19 DIAGNOSIS — Z98.51 TUBAL LIGATION STATUS: Chronic | ICD-10-CM

## 2025-02-19 DIAGNOSIS — I10 ESSENTIAL (PRIMARY) HYPERTENSION: ICD-10-CM

## 2025-02-19 DIAGNOSIS — K21.9 GASTRO-ESOPHAGEAL REFLUX DISEASE W/OUT ESOPHAGITIS: ICD-10-CM

## 2025-02-19 DIAGNOSIS — K21.9 GASTRO-ESOPHAGEAL REFLUX DISEASE WITHOUT ESOPHAGITIS: ICD-10-CM

## 2025-02-19 DIAGNOSIS — K29.50 UNSPECIFIED CHRONIC GASTRITIS WITHOUT BLEEDING: ICD-10-CM

## 2025-02-19 DIAGNOSIS — Z90.710 ACQUIRED ABSENCE OF BOTH CERVIX AND UTERUS: Chronic | ICD-10-CM

## 2025-02-19 PROCEDURE — 99213 OFFICE O/P EST LOW 20 MIN: CPT | Mod: 93

## 2025-04-30 ENCOUNTER — OUTPATIENT (OUTPATIENT)
Dept: OUTPATIENT SERVICES | Facility: HOSPITAL | Age: 60
LOS: 1 days | End: 2025-04-30

## 2025-04-30 ENCOUNTER — APPOINTMENT (OUTPATIENT)
Dept: INTERNAL MEDICINE | Facility: CLINIC | Age: 60
End: 2025-04-30

## 2025-04-30 VITALS
OXYGEN SATURATION: 97 % | WEIGHT: 186 LBS | SYSTOLIC BLOOD PRESSURE: 122 MMHG | BODY MASS INDEX: 32.96 KG/M2 | HEIGHT: 63 IN | DIASTOLIC BLOOD PRESSURE: 88 MMHG | HEART RATE: 84 BPM

## 2025-04-30 DIAGNOSIS — Z90.710 ACQUIRED ABSENCE OF BOTH CERVIX AND UTERUS: Chronic | ICD-10-CM

## 2025-04-30 DIAGNOSIS — I10 ESSENTIAL (PRIMARY) HYPERTENSION: ICD-10-CM

## 2025-04-30 DIAGNOSIS — H91.92 UNSPECIFIED HEARING LOSS, LEFT EAR: ICD-10-CM

## 2025-04-30 DIAGNOSIS — Z98.89 OTHER SPECIFIED POSTPROCEDURAL STATES: Chronic | ICD-10-CM

## 2025-04-30 DIAGNOSIS — Z98.890 OTHER SPECIFIED POSTPROCEDURAL STATES: Chronic | ICD-10-CM

## 2025-04-30 DIAGNOSIS — E04.1 NONTOXIC SINGLE THYROID NODULE: ICD-10-CM

## 2025-04-30 DIAGNOSIS — Z98.51 TUBAL LIGATION STATUS: Chronic | ICD-10-CM

## 2025-04-30 PROCEDURE — G2211 COMPLEX E/M VISIT ADD ON: CPT

## 2025-04-30 PROCEDURE — G0463: CPT

## 2025-04-30 PROCEDURE — 99213 OFFICE O/P EST LOW 20 MIN: CPT | Mod: GC

## 2025-06-02 ENCOUNTER — APPOINTMENT (OUTPATIENT)
Dept: ULTRASOUND IMAGING | Facility: CLINIC | Age: 60
End: 2025-06-02
Payer: SELF-PAY

## 2025-06-02 ENCOUNTER — OUTPATIENT (OUTPATIENT)
Dept: OUTPATIENT SERVICES | Facility: HOSPITAL | Age: 60
LOS: 1 days | End: 2025-06-02
Payer: SELF-PAY

## 2025-06-02 DIAGNOSIS — Z98.89 OTHER SPECIFIED POSTPROCEDURAL STATES: Chronic | ICD-10-CM

## 2025-06-02 DIAGNOSIS — E04.1 NONTOXIC SINGLE THYROID NODULE: ICD-10-CM

## 2025-06-02 DIAGNOSIS — I10 ESSENTIAL (PRIMARY) HYPERTENSION: ICD-10-CM

## 2025-06-02 DIAGNOSIS — Z98.51 TUBAL LIGATION STATUS: Chronic | ICD-10-CM

## 2025-06-02 DIAGNOSIS — Z98.890 OTHER SPECIFIED POSTPROCEDURAL STATES: Chronic | ICD-10-CM

## 2025-06-02 DIAGNOSIS — Z90.710 ACQUIRED ABSENCE OF BOTH CERVIX AND UTERUS: Chronic | ICD-10-CM

## 2025-06-02 PROCEDURE — 76536 US EXAM OF HEAD AND NECK: CPT

## 2025-06-02 PROCEDURE — 76536 US EXAM OF HEAD AND NECK: CPT | Mod: 26

## 2025-06-04 ENCOUNTER — APPOINTMENT (OUTPATIENT)
Dept: INTERNAL MEDICINE | Facility: CLINIC | Age: 60
End: 2025-06-04
Payer: COMMERCIAL

## 2025-06-04 ENCOUNTER — OUTPATIENT (OUTPATIENT)
Dept: OUTPATIENT SERVICES | Facility: HOSPITAL | Age: 60
LOS: 1 days | End: 2025-06-04
Payer: SELF-PAY

## 2025-06-04 ENCOUNTER — INPATIENT (INPATIENT)
Facility: HOSPITAL | Age: 60
LOS: 1 days | Discharge: HOME | DRG: 392 | End: 2025-06-06
Attending: SURGERY | Admitting: SURGERY
Payer: SELF-PAY

## 2025-06-04 VITALS — TEMPERATURE: 99.4 F

## 2025-06-04 VITALS
WEIGHT: 171.96 LBS | HEART RATE: 99 BPM | DIASTOLIC BLOOD PRESSURE: 89 MMHG | SYSTOLIC BLOOD PRESSURE: 139 MMHG | HEIGHT: 63 IN | OXYGEN SATURATION: 100 % | RESPIRATION RATE: 16 BRPM | TEMPERATURE: 99 F

## 2025-06-04 VITALS
DIASTOLIC BLOOD PRESSURE: 82 MMHG | HEIGHT: 63 IN | SYSTOLIC BLOOD PRESSURE: 122 MMHG | HEART RATE: 81 BPM | WEIGHT: 179 LBS | OXYGEN SATURATION: 97 % | BODY MASS INDEX: 31.71 KG/M2

## 2025-06-04 DIAGNOSIS — Z98.51 TUBAL LIGATION STATUS: Chronic | ICD-10-CM

## 2025-06-04 DIAGNOSIS — E78.5 HYPERLIPIDEMIA, UNSPECIFIED: ICD-10-CM

## 2025-06-04 DIAGNOSIS — R93.5 ABNORMAL FINDINGS ON DIAGNOSTIC IMAGING OF OTHER ABDOMINAL REGIONS, INCLUDING RETROPERITONEUM: ICD-10-CM

## 2025-06-04 DIAGNOSIS — I10 ESSENTIAL (PRIMARY) HYPERTENSION: ICD-10-CM

## 2025-06-04 DIAGNOSIS — Z98.890 OTHER SPECIFIED POSTPROCEDURAL STATES: Chronic | ICD-10-CM

## 2025-06-04 DIAGNOSIS — H91.92 UNSPECIFIED HEARING LOSS, LEFT EAR: ICD-10-CM

## 2025-06-04 DIAGNOSIS — Z98.89 OTHER SPECIFIED POSTPROCEDURAL STATES: Chronic | ICD-10-CM

## 2025-06-04 DIAGNOSIS — R10.9 UNSPECIFIED ABDOMINAL PAIN: ICD-10-CM

## 2025-06-04 DIAGNOSIS — E04.1 NONTOXIC SINGLE THYROID NODULE: ICD-10-CM

## 2025-06-04 DIAGNOSIS — K44.9 DIAPHRAGMATIC HERNIA W/OUT OBSTRUCTION OR GANGRENE: ICD-10-CM

## 2025-06-04 DIAGNOSIS — R10.13 EPIGASTRIC PAIN: ICD-10-CM

## 2025-06-04 DIAGNOSIS — Z90.710 ACQUIRED ABSENCE OF BOTH CERVIX AND UTERUS: Chronic | ICD-10-CM

## 2025-06-04 LAB
ADD ON TEST-SPECIMEN IN LAB: SIGNIFICANT CHANGE UP
ALBUMIN SERPL ELPH-MCNC: 4.5 G/DL — SIGNIFICANT CHANGE UP (ref 3.3–5)
ALP SERPL-CCNC: 105 U/L — SIGNIFICANT CHANGE UP (ref 40–120)
ALT FLD-CCNC: 33 U/L — SIGNIFICANT CHANGE UP (ref 10–45)
ANION GAP SERPL CALC-SCNC: 14 MMOL/L — SIGNIFICANT CHANGE UP (ref 5–17)
APPEARANCE UR: CLEAR — SIGNIFICANT CHANGE UP
APTT BLD: 28.6 SEC — SIGNIFICANT CHANGE UP (ref 26.1–36.8)
AST SERPL-CCNC: 33 U/L — SIGNIFICANT CHANGE UP (ref 10–40)
BASOPHILS # BLD AUTO: 0.03 K/UL — SIGNIFICANT CHANGE UP (ref 0–0.2)
BASOPHILS NFR BLD AUTO: 0.6 % — SIGNIFICANT CHANGE UP (ref 0–2)
BILIRUB SERPL-MCNC: 0.4 MG/DL — SIGNIFICANT CHANGE UP (ref 0.2–1.2)
BILIRUB UR-MCNC: NEGATIVE — SIGNIFICANT CHANGE UP
BUN SERPL-MCNC: 11 MG/DL — SIGNIFICANT CHANGE UP (ref 7–23)
CALCIUM SERPL-MCNC: 9.8 MG/DL — SIGNIFICANT CHANGE UP (ref 8.4–10.5)
CHLORIDE SERPL-SCNC: 102 MMOL/L — SIGNIFICANT CHANGE UP (ref 96–108)
CO2 SERPL-SCNC: 24 MMOL/L — SIGNIFICANT CHANGE UP (ref 22–31)
COLOR SPEC: YELLOW — SIGNIFICANT CHANGE UP
CREAT SERPL-MCNC: 0.51 MG/DL — SIGNIFICANT CHANGE UP (ref 0.5–1.3)
DIFF PNL FLD: NEGATIVE — SIGNIFICANT CHANGE UP
EGFR: 109 ML/MIN/1.73M2 — SIGNIFICANT CHANGE UP
EGFR: 109 ML/MIN/1.73M2 — SIGNIFICANT CHANGE UP
EOSINOPHIL # BLD AUTO: 0.05 K/UL — SIGNIFICANT CHANGE UP (ref 0–0.5)
EOSINOPHIL NFR BLD AUTO: 1 % — SIGNIFICANT CHANGE UP (ref 0–6)
GAS PNL BLDV: SIGNIFICANT CHANGE UP
GLUCOSE SERPL-MCNC: 104 MG/DL — HIGH (ref 70–99)
GLUCOSE UR QL: NEGATIVE MG/DL — SIGNIFICANT CHANGE UP
HCT VFR BLD CALC: 42.9 % — SIGNIFICANT CHANGE UP (ref 34.5–45)
HGB BLD-MCNC: 14 G/DL — SIGNIFICANT CHANGE UP (ref 11.5–15.5)
IMM GRANULOCYTES NFR BLD AUTO: 0.2 % — SIGNIFICANT CHANGE UP (ref 0–0.9)
INR BLD: 0.96 RATIO — SIGNIFICANT CHANGE UP (ref 0.85–1.16)
KETONES UR QL: NEGATIVE MG/DL — SIGNIFICANT CHANGE UP
LEUKOCYTE ESTERASE UR-ACNC: NEGATIVE — SIGNIFICANT CHANGE UP
LIDOCAIN IGE QN: 44 U/L — SIGNIFICANT CHANGE UP (ref 7–60)
LYMPHOCYTES # BLD AUTO: 2.01 K/UL — SIGNIFICANT CHANGE UP (ref 1–3.3)
LYMPHOCYTES # BLD AUTO: 40.8 % — SIGNIFICANT CHANGE UP (ref 13–44)
MCHC RBC-ENTMCNC: 29.1 PG — SIGNIFICANT CHANGE UP (ref 27–34)
MCHC RBC-ENTMCNC: 32.6 G/DL — SIGNIFICANT CHANGE UP (ref 32–36)
MCV RBC AUTO: 89.2 FL — SIGNIFICANT CHANGE UP (ref 80–100)
MONOCYTES # BLD AUTO: 0.34 K/UL — SIGNIFICANT CHANGE UP (ref 0–0.9)
MONOCYTES NFR BLD AUTO: 6.9 % — SIGNIFICANT CHANGE UP (ref 2–14)
NEUTROPHILS # BLD AUTO: 2.49 K/UL — SIGNIFICANT CHANGE UP (ref 1.8–7.4)
NEUTROPHILS NFR BLD AUTO: 50.5 % — SIGNIFICANT CHANGE UP (ref 43–77)
NITRITE UR-MCNC: NEGATIVE — SIGNIFICANT CHANGE UP
NRBC BLD AUTO-RTO: 0 /100 WBCS — SIGNIFICANT CHANGE UP (ref 0–0)
PH UR: 8.5 (ref 5–8)
PLATELET # BLD AUTO: 204 K/UL — SIGNIFICANT CHANGE UP (ref 150–400)
POTASSIUM SERPL-MCNC: 3.4 MMOL/L — LOW (ref 3.5–5.3)
POTASSIUM SERPL-SCNC: 3.4 MMOL/L — LOW (ref 3.5–5.3)
PROT SERPL-MCNC: 8 G/DL — SIGNIFICANT CHANGE UP (ref 6–8.3)
PROT UR-MCNC: NEGATIVE MG/DL — SIGNIFICANT CHANGE UP
PROTHROM AB SERPL-ACNC: 10.9 SEC — SIGNIFICANT CHANGE UP (ref 9.9–13.4)
RBC # BLD: 4.81 M/UL — SIGNIFICANT CHANGE UP (ref 3.8–5.2)
RBC # FLD: 11.9 % — SIGNIFICANT CHANGE UP (ref 10.3–14.5)
SODIUM SERPL-SCNC: 140 MMOL/L — SIGNIFICANT CHANGE UP (ref 135–145)
SP GR SPEC: <1.005 — LOW (ref 1–1.03)
TROPONIN T, HIGH SENSITIVITY RESULT: <6 NG/L — SIGNIFICANT CHANGE UP (ref 0–51)
UROBILINOGEN FLD QL: 0.2 MG/DL — SIGNIFICANT CHANGE UP (ref 0.2–1)
WBC # BLD: 4.93 K/UL — SIGNIFICANT CHANGE UP (ref 3.8–10.5)
WBC # FLD AUTO: 4.93 K/UL — SIGNIFICANT CHANGE UP (ref 3.8–10.5)

## 2025-06-04 PROCEDURE — 76705 ECHO EXAM OF ABDOMEN: CPT | Mod: 26

## 2025-06-04 PROCEDURE — 99214 OFFICE O/P EST MOD 30 MIN: CPT | Mod: GC

## 2025-06-04 PROCEDURE — T1013: CPT

## 2025-06-04 PROCEDURE — 99223 1ST HOSP IP/OBS HIGH 75: CPT | Mod: 57

## 2025-06-04 PROCEDURE — G2211 COMPLEX E/M VISIT ADD ON: CPT

## 2025-06-04 PROCEDURE — G0463: CPT

## 2025-06-04 PROCEDURE — 74177 CT ABD & PELVIS W/CONTRAST: CPT | Mod: 26

## 2025-06-04 PROCEDURE — 93010 ELECTROCARDIOGRAM REPORT: CPT

## 2025-06-04 PROCEDURE — 99285 EMERGENCY DEPT VISIT HI MDM: CPT

## 2025-06-04 RX ORDER — ATORVASTATIN CALCIUM 20 MG/1
20 TABLET, FILM COATED ORAL
Qty: 1 | Refills: 3 | Status: ACTIVE | COMMUNITY
Start: 2025-06-04 | End: 1900-01-01

## 2025-06-04 RX ORDER — SODIUM CHLORIDE 9 G/1000ML
1000 INJECTION, SOLUTION INTRAVENOUS
Refills: 0 | Status: DISCONTINUED | OUTPATIENT
Start: 2025-06-04 | End: 2025-06-05

## 2025-06-04 RX ORDER — ENOXAPARIN SODIUM 100 MG/ML
40 INJECTION SUBCUTANEOUS EVERY 24 HOURS
Refills: 0 | Status: DISCONTINUED | OUTPATIENT
Start: 2025-06-04 | End: 2025-06-05

## 2025-06-04 RX ORDER — ACETAMINOPHEN 500 MG/5ML
1000 LIQUID (ML) ORAL ONCE
Refills: 0 | Status: COMPLETED | OUTPATIENT
Start: 2025-06-04 | End: 2025-06-04

## 2025-06-04 RX ADMIN — Medication 20 MILLIGRAM(S): at 16:40

## 2025-06-04 RX ADMIN — Medication 400 MILLIGRAM(S): at 16:40

## 2025-06-04 RX ADMIN — Medication 1000 MILLILITER(S): at 16:40

## 2025-06-04 NOTE — MEDICAL STUDENT ADULT H&P (EDUCATION) - PLAN 2
Admit to floor. Consult GI, and order endoscopy to assess hernia. If hiatal hernia is mild, consider lifestyle changes and medications. If hernia is severe, consider surgery.

## 2025-06-04 NOTE — H&P ADULT - NSHPLABSRESULTS_GEN_ALL_CORE
< from: US Abdomen Upper Quadrant Right (06.04.25 @ 17:55) >    IMPRESSION:  *  Cholelithiasis without definitive evidence of acute cholecystitis.   HIDA scan may be performed for further evaluation as clinical warranted.  *  Echogenic area within the liver measuring 1.8 x 2.2 x 2.2 cm, seen on   prior right upper quadrant ultrasound, but no corresponding abnormality   on subsequent MRI.  *  Mild right hydronephrosis.    < end of copied text >    < from: CT Abdomen and Pelvis w/ IV Cont (06.04.25 @ 20:18) >    FINDINGS:  LOWER CHEST: Right lower lobe calcified granuloma. Basilar subsegmental   atelectasis.    LIVER: Within normal limits.  BILE DUCTS: Normal caliber.  GALLBLADDER: Cholelithiasis.  SPLEEN: Within normal limits.  PANCREAS: Within normal limits.  ADRENALS: Within normal limits.  KIDNEYS/URETERS: No renal stones or hydronephrosis.    BLADDER: Within normal limits.  REPRODUCTIVE ORGANS: Hysterectomy    BOWEL: Grade 3 hiatal hernia with the stomach fundus protruding above the   diaphragm. No bowel obstruction. Appendix is not visualized. No evidence   of inflammation in the pericecal region.  PERITONEUM/RETROPERITONEUM: Within normal limits.  VESSELS: Within normal limits.  LYMPH NODES: No lymphadenopathy.  ABDOMINAL WALL: Within normal limits.  BONES: Degenerative changes.    IMPRESSION:  No acute pathology in the abdomen or pelvis  Grade 3 hiatal hernia. Cholelithiasis.    < end of copied text >

## 2025-06-04 NOTE — MEDICAL STUDENT ADULT H&P (EDUCATION) - NS MD HP STUD PSH FT
Right wrist/hand surgery (2019)  Thyroid, partial removal of a mass (2016 or 2017)  Hysterectomy (2012)

## 2025-06-04 NOTE — MEDICAL STUDENT ADULT H&P (EDUCATION) - HISTORY OF PRESENT ILLNESS
Patient DC is a 59 year old woman, with a past medical history of gastric ulcers and gallstones, presenting to the ED from an outpatient clinic for acutely worsening stomach pain.   Patient reports that the pain started this morning and was uniquely worse today compared to her prior experiences. She went to the clinic and they sent her to the [place] emergency room. Patient notes that the pain originally onset Friday (five days ago), but was acutely worse today. The pain is not worsened after eating, but is palliated by the use of a warm pillow. Patient describes the pain quality as a stabbing pain on a 10/10 scale. The pain starts in the epigastric area then radiates to both sides of her abdomen. Associated symptoms include difficulty breathing and loss of appetite. Review of systems is positive for headaches. Pertinent negatives include no fever, no chest pain, no pain on urination, and no changes in bowel movements. 59 year old woman, PMH of gastric ulcers and gallstones, PSH of JALEESA, BSO in 2016 Pfannenstiel incision) thyroidectomy in 2016, presenting to the ED from an outpatient clinic for acutely worsening stomach pain. Patient states the pain originally started 5 days prior but had worsened today. Patient states the pain starts in the epigastric area then radiates to both sides of her abdomen.

## 2025-06-04 NOTE — ED PROVIDER NOTE - PROGRESS NOTE DETAILS
Lan Ramirez PA-C: pt signed out to me by previous team. pending imaging at this time. will continue to monitor. Lan michaels PA-C: imaging reviewed. patient re-evaluated. still having pain to RUQ. surgery paged. awaiting call back Lan michaels PA-C: spoke with surgery. recommend admission to their service.

## 2025-06-04 NOTE — ED PROVIDER NOTE - PRO INTERPRETER NEED 2
Traumatic brain injury with loss of consciousness, sequela  Assessment & Plan:  As per Neurology rec 12/03/2024  We have ordered an EEG to r/o seizure-like activity. Patient had an EEG at [facility] that was undeterminable   Referral for vestibular therapy for imbalance  Referral for Neurosurgery to f/u on [facility] on 12/30 at 9am   Please retrieve the images from [facility] on CD and bring them to medical records at [facility] so that physicians may review.  Referral for speech for cognitive impairment  Start beginner's yoga and advance as tolerated  You do not feel your feet well due to neuropathy. Please wear footwear with good tread at all times. This recommendation includes wearing socks with tread on them. Make sure all of your walkways, hallways, and maddi are well lit at all times day and night. Use night lights to light up commonly used pathways in your home, meaning from the bedroom to the bathroom or kitchen. Make sure all of your walkways, hallways, and maddi are clear of obstacles at all times. Obstacles include decorations, rugs/mats, pet beds, shoes, and clothes. Make sure you have good hand holds to grab onto as you walk around your home.     Keep appt on 12/23/2024 for CT head    Keep eye appt on 1/15/2024    Drink Boost when appetite is poor.     Drink 4-6 cups of water daily            Kazakh

## 2025-06-04 NOTE — H&P ADULT - ASSESSMENT
59 year old woman, PMH of gastric ulcers and gallstones, PSH of JALEESA, BSO in 2016 Pfannenstiel incision) thyroidectomy in 2016, presents with abdominal pain c/f cholecystitis.     Plan  -admit to surgery under Dr. Randall  - NPO/IVF  - add on to OR for lap josé miguel, possible open  - to be consented  - pain control PRN  - dvt ppx    Patient seen and examined with and plan discussed with attending Dr. Randall    Trauma/ACS  [phone]  59 year old woman, PMH of gastric ulcers and gallstones, PSH of JALEESA, BSO in 2016 Pfannenstiel incision) thyroidectomy in 2016, presents with abdominal pain c/f cholecystitis.     Plan  -admit to surgery under Dr. Randall  - NPO/IVF  - add on to OR for lap josé miguel, possible open  - to be consented  - med rec incomplete. Pt states she takes one medication for acid reflux, which she could not remember  - pain control PRN  - dvt ppx    Patient seen and examined with and plan discussed with attending Dr. Randall    Trauma/ACS  [phone]  59 year old woman, PMH of gastric ulcers and gallstones, PSH of JALEESA, BSO in 2016 Pfannenstiel incision) thyroidectomy in 2016, presents with abdominal pain c/f cholecystitis.     Plan  -admit to surgery under Dr. Randall  - NPO/IVF  - add on to OR for lap josé miguel, possible open  - consented  - med rec incomplete. Pt states she takes one medication for acid reflux, which she could not remember  - pain control PRN  - dvt ppx    Patient seen and examined with and plan discussed with attending Dr. Randall    Trauma/ACS  [phone]

## 2025-06-04 NOTE — H&P ADULT - HISTORY OF PRESENT ILLNESS
59 year old woman, PMH of gastric ulcers and gallstones, PSH of JALEESA, BSO in 2016 Pfannenstiel incision) thyroidectomy in 2016, presenting to the ED from an outpatient clinic for acutely worsening stomach pain. Patient states the pain originally started 5 days prior but had worsened today. Patient states the pain starts in the epigastric area then radiates to both sides of her abdomen.

## 2025-06-04 NOTE — MEDICAL STUDENT ADULT H&P (EDUCATION) - NSHPPHYSICALEXAM_GEN_ALL_CORE
T(C): 36.7 (06-04-25 @ 23:44), Max: 37.1 (06-04-25 @ 13:01)  HR: 61 (06-04-25 @ 23:44) (61 - 99)  BP: 120/69 (06-04-25 @ 23:44) (120/69 - 141/71)  RR: 20 (06-04-25 @ 23:44) (16 - 20)  SpO2: 98% (06-04-25 @ 23:44) (95% - 100%)    CONSTITUTIONAL: Well groomed, no apparent distress  RESP: Nonlabored respirations   CV: RRR, +S1S2  GI: Soft, NT, ND, no rebound, no guarding; no palpable masses; no hepatosplenomegaly; no hernia palpated  LYMPH: No cervical LAD or tenderness; no axillary LAD or tenderness; no inguinal LAD or tenderness  MSK: Normal gait; No digital clubbing or cyanosis; examination of the (head/neck/spine/ribs/pelvis, RUE, LUE, RLE, LLE) without misalignment,            Normal ROM without pain, no spinal tenderness, normal muscle strength/tone  SKIN: No rashes or ulcers noted; no subcutaneous nodules or induration palpable  NEURO: CN II-XII intact; normal reflexes in upper and lower extremities, sensation intact in upper and lower extremities b/l to light touch   PSYCH: Appropriate insight/judgment; A+O x 3, mood and affect appropriate, recent/remote memory intact

## 2025-06-04 NOTE — MEDICAL STUDENT ADULT H&P (EDUCATION) - IMPACT, CONCERN, EXPLANATORY MODEL
The patient was impacted by the constant pain all day and did not eat because of it. She is concerned that it is so severe. She thinks it may be explained by her gallstones or the stomach ulcers, because she cannot attribute the pain to a specific condition.

## 2025-06-04 NOTE — MEDICAL STUDENT ADULT H&P (EDUCATION) - NS MD HP STUD FAM HX FT
Mom and sister have diabetes mellitus. Sister has asthma.   2 brothers with heart disease e.g. CAD, and both have hypertension

## 2025-06-04 NOTE — ED PROVIDER NOTE - PHYSICAL EXAMINATION
VS noted  Gen. no acute distress, Non toxic   HEENT: EOMI, mmm,   Lungs: CTAB/L no C/ W /R   CVS: RRR   Abd; Soft, ttp in RUQ, RLQ, no rebound  Ext: no edema  Skin: no rash  Neuro AAOx3 non focal clear speech  - J Carlos ALEXIS

## 2025-06-04 NOTE — ED PROVIDER NOTE - CLINICAL SUMMARY MEDICAL DECISION MAKING FREE TEXT BOX
J Carlos ALEXIS: Patient is a 59-year-old Bahamian-speaking female with a history of hypertension, high cholesterol, iron deficiency anemia, history of abdominal hysterectomy, tubal ligation, thyroid surgery, history of breast biopsy here for evaluation of upper abdominal pain x 6 days.  Patient reports symptoms started on Friday.  Pain is not worse with eating.  Pain is in her upper abdomen, described as sharp.  She denies any associated fevers, chills, nausea or vomiting.  She has no diarrhea, black or bloody stools.  She denies any urinary symptoms.  Patient went to an outpatient clinic today and was told to come in for evaluation.  She reports that she was told she has a history of an ulcer and gallstones.   Patient did not take anything for pain prior to coming in. I spoke to patient via , Brunilda Arredondo.   On exam, patient has tenderness in the right upper quadrant and right lower quadrant.  Differential includes cholecystitis, pancreatitis, pain related to ulcer, possible appendicitis given lower abdominal tenderness.  Plan for labs, pain control, RUQ ultrasound, CT abdomen pelvis.

## 2025-06-04 NOTE — H&P ADULT - ATTENDING COMMENTS
seen and examined 06-04-25 @ 1105    6/10/2016 @ [facility] - open hysterectomy for fibroids    soft / moderate RUQ tenderness / ND  Pfannenstiel scar  no jaundice    WBC = 4  LFTs - wnl  lipase - 44  lactate - 1.3    RUQ U/S - cholelithiasis. CBD = 4 mm.    CT abd/pelvis w/ contrast - large gallstone. large hiatal hernia without evidence of volvulus. (I reviewed the radiologic images)      cholelithiasis with acute cholecystitis  -The risks (bleeding, infection, bile duct injury, retained bile duct stone or bile leak requiring endoscopy), benefits and alternatives of laparoscopic (possible open) cholecystectomy were discussed in English with the patient and her  at bedside in the ED.  -I explained that the operating room has limited availability for urgent cases, so it might be a few days before we are able to perform the surgery. seen and examined 06-04-25 @ 2305    6/10/2016 @ [facility] - open hysterectomy for fibroids    soft / moderate RUQ tenderness / ND  Pfannenstiel scar  no jaundice    WBC = 4  LFTs - wnl  lipase - 44  lactate - 1.3    RUQ U/S - cholelithiasis. CBD = 4 mm.    CT abd/pelvis w/ contrast - large gallstone. large hiatal hernia without evidence of volvulus. (I reviewed the radiologic images)      cholelithiasis with acute cholecystitis  -The risks (bleeding, infection, bile duct injury, retained bile duct stone or bile leak requiring endoscopy), benefits and alternatives of laparoscopic (possible open) cholecystectomy were discussed in Tajik with the patient and her  at bedside in the ED.  -I explained that the operating room has limited availability for urgent cases, so it might be a few days before we are able to perform the surgery.

## 2025-06-04 NOTE — MEDICAL STUDENT ADULT H&P (EDUCATION) - ASSESSMENT
Patient has gallstone, and a hiatal hernia, which could both be contributing to her abdominal pain. Patient also says she can feel a palpable mass, suspicious for abdominal hernia in the right lower quadrant.

## 2025-06-04 NOTE — MEDICAL STUDENT ADULT H&P (EDUCATION) - NSHPSOCIALHISTORY_GEN_ALL_CORE
Works as a  and housework. Lives at home with boyfriend. Does not have health insurance and is on emergency medicaid.

## 2025-06-04 NOTE — ED PROVIDER NOTE - OBJECTIVE STATEMENT
J Carlos ALEXIS: Patient is a 59-year-old Prydeinig-speaking female with a history of hypertension, high cholesterol, iron deficiency anemia, history of abdominal hysterectomy, tubal ligation, thyroid surgery, history of breast biopsy here for evaluation of upper abdominal pain x 6 days.  Patient reports symptoms started on Friday.  Pain is not worse with eating.  Pain is in her upper abdomen, described as sharp.  She denies any associated fevers, chills, nausea or vomiting.  She has no diarrhea, black or bloody stools.  She denies any urinary symptoms.  Patient went to an outpatient clinic today and was told to come in for evaluation.  She reports that she was told she has a history of an ulcer and gallstones.  Patient did not take anything for pain prior to coming in. I spoke to patient via , Brunilda Arredondo.

## 2025-06-04 NOTE — H&P ADULT - NSHPPHYSICALEXAM_GEN_ALL_CORE
[FreeTextEntry1] : Pt is a 52 yo s/p BASILIA, BSO, tumor debulking, peritonectomy due to extensive endometriosis of entire abdominal cavity. S/p end colostomy and colon resection. Pathology with focus of grade 1 endometrioid adenocarcinoma arising from background of endometriosis on the uterine serosa. She was recently diagnosed with right breast cancer s/p bilateral mastectomy. She has a positive attitude and still hasn’t found out if she will need adjuvant treatment for breast cancer. She has follow up with Dr. Yarbrough. She also had colostomy reversal and abdominoplasty in the interim. \par \par No vaginal bleeding, no discharge. She is not sexually active. She has had a better appetite and gained weight. 
T(C): 36.5 (06-05-25 @ 00:51), Max: 37.1 (06-04-25 @ 13:01)  HR: 68 (06-05-25 @ 00:51) (61 - 99)  BP: 143/86 (06-05-25 @ 00:51) (120/69 - 143/86)  RR: 18 (06-05-25 @ 00:51) (16 - 20)  SpO2: 96% (06-05-25 @ 00:51) (95% - 100%)    CONSTITUTIONAL: Well groomed, no apparent distress  RESP: Nonlabored respirations   CV: RRR, +S1S2  GI: Softly distended, TTP in RUQ, well healed Pfannenstiel incision  MSK: Normal gait; No digital clubbing or cyanosis; examination of the (head/neck/spine/ribs/pelvis, RUE, LUE, RLE, LLE) without misalignment,            Normal ROM without pain, no spinal tenderness, normal muscle strength/tone  SKIN: No rashes or ulcers noted; no subcutaneous nodules or induration palpable  NEURO: CN II-XII intact; normal reflexes in upper and lower extremities, sensation intact in upper and lower extremities b/l to light touch   PSYCH: Appropriate insight/judgment; A+O x 3, mood and affect appropriate, recent/remote memory intact

## 2025-06-04 NOTE — ED ADULT NURSE NOTE - OBJECTIVE STATEMENT
1530 58 y/o f to er w/family w/c/o R sided  abd pain x few days. no v/f/c/d/cpsob. hx of hysterectomy. thru   [id number] [place].

## 2025-06-04 NOTE — MEDICAL STUDENT ADULT H&P (EDUCATION) - TRANSITIONS OF CARE/DISPOSITION: (SDOH, ANTICIPATED DC NEEDS)
Patient does not have health insurance, and uses emergency Medicaid but is generally unsure how to cover the cost of her care or medications. Patient is Norwegian-speaking and has support at home but will need clear discharge instructions in Sinhala.

## 2025-06-04 NOTE — MEDICAL STUDENT ADULT H&P (EDUCATION) - PLAN 1
Recurrence of gallstones for the past 5 months might warrant cholecystectomy. Absent any signs of jaundice or infection, the surgery is elective.

## 2025-06-05 ENCOUNTER — RESULT REVIEW (OUTPATIENT)
Age: 60
End: 2025-06-05

## 2025-06-05 LAB
ALBUMIN SERPL ELPH-MCNC: 4.2 G/DL — SIGNIFICANT CHANGE UP (ref 3.3–5)
ALP SERPL-CCNC: 96 U/L — SIGNIFICANT CHANGE UP (ref 40–120)
ALT FLD-CCNC: 27 U/L — SIGNIFICANT CHANGE UP (ref 10–45)
ANION GAP SERPL CALC-SCNC: 11 MMOL/L — SIGNIFICANT CHANGE UP (ref 5–17)
APTT BLD: 28.4 SEC — SIGNIFICANT CHANGE UP (ref 26.1–36.8)
AST SERPL-CCNC: 24 U/L — SIGNIFICANT CHANGE UP (ref 10–40)
BILIRUB SERPL-MCNC: 0.4 MG/DL — SIGNIFICANT CHANGE UP (ref 0.2–1.2)
BLD GP AB SCN SERPL QL: NEGATIVE — SIGNIFICANT CHANGE UP
BUN SERPL-MCNC: 9 MG/DL — SIGNIFICANT CHANGE UP (ref 7–23)
CALCIUM SERPL-MCNC: 9.3 MG/DL — SIGNIFICANT CHANGE UP (ref 8.4–10.5)
CHLORIDE SERPL-SCNC: 106 MMOL/L — SIGNIFICANT CHANGE UP (ref 96–108)
CO2 SERPL-SCNC: 25 MMOL/L — SIGNIFICANT CHANGE UP (ref 22–31)
CREAT SERPL-MCNC: 0.56 MG/DL — SIGNIFICANT CHANGE UP (ref 0.5–1.3)
EGFR: 106 ML/MIN/1.73M2 — SIGNIFICANT CHANGE UP
EGFR: 106 ML/MIN/1.73M2 — SIGNIFICANT CHANGE UP
GLUCOSE SERPL-MCNC: 91 MG/DL — SIGNIFICANT CHANGE UP (ref 70–99)
HCT VFR BLD CALC: 41.4 % — SIGNIFICANT CHANGE UP (ref 34.5–45)
HGB BLD-MCNC: 13.7 G/DL — SIGNIFICANT CHANGE UP (ref 11.5–15.5)
INR BLD: 0.99 RATIO — SIGNIFICANT CHANGE UP (ref 0.85–1.16)
MAGNESIUM SERPL-MCNC: 2.2 MG/DL — SIGNIFICANT CHANGE UP (ref 1.6–2.6)
MCHC RBC-ENTMCNC: 29.2 PG — SIGNIFICANT CHANGE UP (ref 27–34)
MCHC RBC-ENTMCNC: 33.1 G/DL — SIGNIFICANT CHANGE UP (ref 32–36)
MCV RBC AUTO: 88.3 FL — SIGNIFICANT CHANGE UP (ref 80–100)
NRBC BLD AUTO-RTO: 0 /100 WBCS — SIGNIFICANT CHANGE UP (ref 0–0)
PHOSPHATE SERPL-MCNC: 3.6 MG/DL — SIGNIFICANT CHANGE UP (ref 2.5–4.5)
PLATELET # BLD AUTO: 197 K/UL — SIGNIFICANT CHANGE UP (ref 150–400)
POTASSIUM SERPL-MCNC: 3.3 MMOL/L — LOW (ref 3.5–5.3)
POTASSIUM SERPL-SCNC: 3.3 MMOL/L — LOW (ref 3.5–5.3)
PROT SERPL-MCNC: 7.4 G/DL — SIGNIFICANT CHANGE UP (ref 6–8.3)
PROTHROM AB SERPL-ACNC: 11.3 SEC — SIGNIFICANT CHANGE UP (ref 9.9–13.4)
RBC # BLD: 4.69 M/UL — SIGNIFICANT CHANGE UP (ref 3.8–5.2)
RBC # FLD: 11.9 % — SIGNIFICANT CHANGE UP (ref 10.3–14.5)
RH IG SCN BLD-IMP: POSITIVE — SIGNIFICANT CHANGE UP
SODIUM SERPL-SCNC: 142 MMOL/L — SIGNIFICANT CHANGE UP (ref 135–145)
WBC # BLD: 4.11 K/UL — SIGNIFICANT CHANGE UP (ref 3.8–10.5)
WBC # FLD AUTO: 4.11 K/UL — SIGNIFICANT CHANGE UP (ref 3.8–10.5)

## 2025-06-05 PROCEDURE — 47562 LAPAROSCOPIC CHOLECYSTECTOMY: CPT

## 2025-06-05 PROCEDURE — 88304 TISSUE EXAM BY PATHOLOGIST: CPT | Mod: 26

## 2025-06-05 DEVICE — CLIP APPLIER TELEFLEX WECK AUTO 5MM X 35CM: Type: IMPLANTABLE DEVICE | Status: FUNCTIONAL

## 2025-06-05 RX ORDER — HYDROMORPHONE/SOD CHLOR,ISO/PF 2 MG/10 ML
0.5 SYRINGE (ML) INJECTION
Refills: 0 | Status: DISCONTINUED | OUTPATIENT
Start: 2025-06-05 | End: 2025-06-05

## 2025-06-05 RX ORDER — ENOXAPARIN SODIUM 100 MG/ML
40 INJECTION SUBCUTANEOUS EVERY 24 HOURS
Refills: 0 | Status: DISCONTINUED | OUTPATIENT
Start: 2025-06-05 | End: 2025-06-06

## 2025-06-05 RX ORDER — OXYCODONE HYDROCHLORIDE 30 MG/1
5 TABLET ORAL EVERY 4 HOURS
Refills: 0 | Status: DISCONTINUED | OUTPATIENT
Start: 2025-06-05 | End: 2025-06-06

## 2025-06-05 RX ORDER — ACETAMINOPHEN 500 MG/5ML
1000 LIQUID (ML) ORAL EVERY 6 HOURS
Refills: 0 | Status: DISCONTINUED | OUTPATIENT
Start: 2025-06-05 | End: 2025-06-06

## 2025-06-05 RX ORDER — IBUPROFEN 200 MG
400 TABLET ORAL EVERY 6 HOURS
Refills: 0 | Status: DISCONTINUED | OUTPATIENT
Start: 2025-06-05 | End: 2025-06-06

## 2025-06-05 RX ORDER — OXYCODONE HYDROCHLORIDE 30 MG/1
2.5 TABLET ORAL EVERY 4 HOURS
Refills: 0 | Status: DISCONTINUED | OUTPATIENT
Start: 2025-06-05 | End: 2025-06-06

## 2025-06-05 RX ORDER — SODIUM CHLORIDE 9 G/1000ML
1000 INJECTION, SOLUTION INTRAVENOUS
Refills: 0 | Status: DISCONTINUED | OUTPATIENT
Start: 2025-06-05 | End: 2025-06-05

## 2025-06-05 RX ADMIN — Medication 0.5 MILLIGRAM(S): at 11:45

## 2025-06-05 RX ADMIN — Medication 400 MILLIGRAM(S): at 22:15

## 2025-06-05 RX ADMIN — Medication 400 MILLIGRAM(S): at 16:40

## 2025-06-05 RX ADMIN — OXYCODONE HYDROCHLORIDE 5 MILLIGRAM(S): 30 TABLET ORAL at 13:53

## 2025-06-05 RX ADMIN — ENOXAPARIN SODIUM 40 MILLIGRAM(S): 100 INJECTION SUBCUTANEOUS at 21:40

## 2025-06-05 RX ADMIN — Medication 20 MILLIEQUIVALENT(S): at 15:04

## 2025-06-05 RX ADMIN — Medication 1000 MILLIGRAM(S): at 18:02

## 2025-06-05 RX ADMIN — Medication 1000 MILLIGRAM(S): at 17:26

## 2025-06-05 RX ADMIN — Medication 400 MILLIGRAM(S): at 15:04

## 2025-06-05 RX ADMIN — Medication 0.5 MILLIGRAM(S): at 11:30

## 2025-06-05 RX ADMIN — OXYCODONE HYDROCHLORIDE 5 MILLIGRAM(S): 30 TABLET ORAL at 21:00

## 2025-06-05 RX ADMIN — OXYCODONE HYDROCHLORIDE 5 MILLIGRAM(S): 30 TABLET ORAL at 14:40

## 2025-06-05 RX ADMIN — Medication 1000 MILLIGRAM(S): at 23:23

## 2025-06-05 RX ADMIN — OXYCODONE HYDROCHLORIDE 5 MILLIGRAM(S): 30 TABLET ORAL at 20:26

## 2025-06-05 RX ADMIN — Medication 400 MILLIGRAM(S): at 21:40

## 2025-06-05 RX ADMIN — SODIUM CHLORIDE 100 MILLILITER(S): 9 INJECTION, SOLUTION INTRAVENOUS at 11:46

## 2025-06-05 RX ADMIN — Medication 20 MILLIEQUIVALENT(S): at 15:53

## 2025-06-05 RX ADMIN — Medication 0.5 MILLIGRAM(S): at 11:15

## 2025-06-05 RX ADMIN — Medication 20 MILLIEQUIVALENT(S): at 17:26

## 2025-06-05 NOTE — DISCHARGE NOTE PROVIDER - CARE PROVIDER_API CALL
Kay Chopra  Surgical Critical Care  [address] [place] [address], [address]  [place] [address]  Phone: [phone]  Fax: [phone]  Follow Up Time:    [facility]  [address] [place] [address], [facility] [address]  [place] [address]  Phone: [phone]  Fax: [phone]  Follow Up Time: 2 weeks

## 2025-06-05 NOTE — PRE-OP CHECKLIST - LATEX ALLERGY
Home Care Instructions for Gastroscopy with Sedation    Diet:  - Resume your regular diet as tolerated unless otherwise instructed.  - Start with light meals to minimize bloating.  - Do not drink alcohol today.    Medication:  - If you have questions about resuming your normal medications, please contact your Primary Care Physician.    Activities:  - Take it easy today. Do not return to work today.  - Do not drive today.  - Do not operate any machinery today (including kitchen equipment).    Gastroscopy:  - You may have a sore throat for 2-3 days following the exam. This is normal. Gargling with warm salt water (1/2 tsp salt to 1 glass warm water) or using throat lozenges will help.  - If you experience any sharp pain in your neck, abdomen or chest, vomiting of blood, oral temperature over 100 degrees Fahrenheit, light-headedness or dizziness, or any other problems, contact your doctor.  Plan:   Soft diet today  Repeat EGD with possible band ligation in 3 months      **If unable to reach your doctor, please go to the [facility] Emergency Room**    - Your referring physician will receive a full report of your examination.  - If you do not hear from your doctor's office within two weeks of your biopsy, please call them for your results.    You may be able to see your laboratory results in [employer] between 4 and 7 business days.  In some cases, your physician may not have viewed the results before they are released to [employer].  If you have questions regarding your results contact the physician who ordered the test/exam by phone or via [employer] by choosing \"Ask a Medical Question.\"   no

## 2025-06-05 NOTE — DISCHARGE NOTE PROVIDER - NSDCMRMEDTOKEN_GEN_ALL_CORE_FT
acetaminophen 500 mg oral tablet: 2 tab(s) orally every 6 hours as needed for  mild pain  famotidine 20 mg oral tablet: 1 tab(s) orally every 12 hours  ibuprofen 400 mg oral tablet: 1 tab(s) orally every 6 hours as needed for  moderate pain  oxyCODONE 5 mg oral tablet: 1 tab(s) orally every 6 hours as needed for Severe Pain (7 - 10) MDD: 4

## 2025-06-05 NOTE — BRIEF OPERATIVE NOTE - OPERATION/FINDINGS
-Sony entry  -Critical view obtained  -Hem-O-Keith Clip applier (Automatic) 5mm x 3 cystic duct/artery  -No spillage bile/stones  -No irrigation or hemostatic agents   -0 Vicryl for Fascia, figure 8 x 3

## 2025-06-05 NOTE — CHART NOTE - NSCHARTNOTEFT_GEN_A_CORE
POST-OP NOTE    GILES TIRADO | [id number] | [facility] 2MON [address]1    Procedure: s/p laparoscopic cholecystectomy    Subjective:  Patient and seen and examined at bedside.  Says she is having some soreness after the surgery.  She had a little bit of nausea after the OR but it is improved.  Tolerating diet.  She ambulated and voided.  Denies fevers, chills, CP, SOB, nausea, or emesis.   States she would like to stay because she is having some pain.      Vital Signs Last 24 Hrs  T(C): 36.9 (05 Jun 2025 14:45), Max: 36.9 (05 Jun 2025 04:39)  T(F): 98.5 (05 Jun 2025 14:45), Max: 98.5 (05 Jun 2025 14:45)  HR: 88 (05 Jun 2025 14:45) (61 - 89)  BP: 105/66 (05 Jun 2025 14:45) (101/55 - 162/92)  BP(mean): 80 (05 Jun 2025 13:00) (71 - 99)  RR: 18 (05 Jun 2025 14:45) (14 - 20)  SpO2: 94% (05 Jun 2025 14:45) (93% - 98%)    Parameters below as of 05 Jun 2025 14:45  Patient On (Oxygen Delivery Method): room air      I&O's Summary    04 Jun 2025 07:01  -  05 Jun 2025 07:00  --------------------------------------------------------  IN: 2300 mL / OUT: 0 mL / NET: 2300 mL    05 Jun 2025 07:01  -  05 Jun 2025 16:08  --------------------------------------------------------  IN: 200 mL / OUT: 0 mL / NET: 200 mL                            13.7   4.11  )-----------( 197      ( 05 Jun 2025 04:20 )             41.4     06-05    142  |  106  |  9   ----------------------------<  91  3.3[L]   |  25  |  0.56    Ca    9.3      05 Jun 2025 04:20  Phos  3.6     06-05  Mg     2.2     06-05    TPro  7.4  /  Alb  4.2  /  TBili  0.4  /  DBili  x   /  AST  24  /  ALT  27  /  AlkPhos  96  06-05   PT/INR - ( 05 Jun 2025 04:20 )   PT: 11.3 sec;   INR: 0.99 ratio         PTT - ( 05 Jun 2025 04:20 )  PTT:28.4 sec    PHYSICAL EXAM:  Gen: NAD  Resp: breathing easily, no stridor  CV: RRR  Abdomen: soft, appropriate tenderness to palpation, nondistended, incision sites cdi  Skin: Normal color, no rashes or lesions      A:  59 year old woman, PMH of gastric ulcers and gallstones, PSH of JALEESA, BSO in 2016 Pfannenstiel incision) thyroidectomy in 2016, presents with abdominal pain c/f cholecystitis.  She is now s/p laparoscopic cholecystectomy on 6/5/25.      Plan  - Reg  - pain control PRN  - dvt ppx  - dc tm am    Trauma/ACS  [phone]

## 2025-06-05 NOTE — DISCHARGE NOTE PROVIDER - HOSPITAL COURSE
59 year old woman, PMH of gastric ulcers and gallstones, PSH of JALEESA, BSO in 2016 Pfannenstiel incision) thyroidectomy in 2016, presenting to the ED from an outpatient clinic for acutely worsening stomach pain. Patient states the pain originally started 5 days prior but had worsened today. Patient states the pain starts in the epigastric area then radiates to both sides of her abdomen. Pt underwent CT Abdomen and Pelvis w/ IV Cont No acute pathology in the abdomen or pelvis Grade 3 hiatal hernia. Cholelithiasis. US performed- Cholelithiasis without definitive evidence of acute cholecystitis. HIDA scan may be performed for further evaluation as clinical warranted. Echogenic area within the liver measuring 1.8 x 2.2 x 2.2 cm, seen on prior right upper quadrant ultrasound, but no corresponding abnormality on subsequent MRI. Mild right hydronephrosis.    Pt was admitted under Surgery for further evaluation and management. Pt was taken to the OR on 6/4 and underwent Laparoscopic cholecystectomy without incident. The patient tolerated the procedure well (see operative report for full details). Pt was transferred to the PACU in stable condition. In the PACU, the patient's pain was controlled and vital signs within normal limits. The patient was transferred to the surgical floor in stable condition. On POD #1, pt was stable and doing well. Diet was advanced    On the day of discharge, the patient's vital signs are within normal limits, pain was controlled, voiding urine, passing gas/stool, tolerating regular diet, and ambulating well.    Overall stay uneventful. Stable for discharge home.   59 year old woman, PMH of gastric ulcers and gallstones, PSH of JALEESA, BSO in 2016 Pfannenstiel incision) thyroidectomy in 2016, presenting to the ED from an outpatient clinic for acutely worsening stomach pain. Patient states the pain originally started 5 days prior but had worsened today. Patient states the pain starts in the epigastric area then radiates to both sides of her abdomen. Pt underwent CT Abdomen and Pelvis w/ IV Cont No acute pathology in the abdomen or pelvis Grade 3 hiatal hernia. Cholelithiasis. US performed- Cholelithiasis without definitive evidence of acute cholecystitis. HIDA scan may be performed for further evaluation as clinical warranted. Echogenic area within the liver measuring 1.8 x 2.2 x 2.2 cm, seen on prior right upper quadrant ultrasound, but no corresponding abnormality on subsequent MRI. Mild right hydronephrosis.    Pt was admitted under trauma Surgery for further evaluation and management. Pt was taken to the OR on 6/5 and underwent Laparoscopic cholecystectomy without incident. The patient tolerated the procedure well (see operative report for full details). Pt was transferred to the PACU in stable condition. In the PACU, the patient's pain was controlled and vital signs within normal limits. The patient was transferred to the surgical floor in stable condition. On POD #1, pt was stable and doing well. Diet was advanced and tolerated, pain well controlled with oral pain medication.     On the day of discharge, the patient's vital signs are within normal limits, pain was controlled, voiding urine, passing gas/stool, tolerating regular diet, and ambulating well.    Overall stay uneventful. Stable for discharge home. No abx needed on discharge.

## 2025-06-05 NOTE — DISCHARGE NOTE PROVIDER - NSDCCPTREATMENT_GEN_ALL_CORE_FT
PRINCIPAL PROCEDURE  Procedure: Laparoscopic cholecystectomy  Findings and Treatment:      PRINCIPAL PROCEDURE  Procedure: Laparoscopic cholecystectomy  Findings and Treatment: WOUND CARE: Please keep incisions clean and dry. Please do not Scrub or rub incisions. You have white sterri strips covering your incisions that will fall off on their own over time, these can get wet in the shower. Do not use lotion or powder on incisions.   BATHING: Please do not submerge wound underwater for 4 weeks after discharge. Do not go in a swimming pool or hot tub for 4 weeks after surgery. You may shower and/or sponge bathe.  ACTIVITY: No heavy lifting greater than 10-15 pounds or straining. Otherwise, you may return to your usual level of physical activity. If you are taking narcotic pain medication (such as Percocet) DO NOT drive a car, operate machinery or make important decisions  DIET: Regular diet  NOTIFY YOUR SURGEON IF: You have any bleeding that does not stop, any pus draining from your wound(s), any fever (over 100.4 F) or chills, persistent nausea/vomiting, persistent diarrhea, or if your pain is not controlled on your discharge pain medications.  FOLLOW-UP: Please follow up with Dr. Mancilla or one of the trauma attendings in 2 weeks after you are discharged

## 2025-06-05 NOTE — DISCHARGE NOTE PROVIDER - CARE PROVIDERS DIRECT ADDRESSES
,[email or web address]

## 2025-06-06 ENCOUNTER — TRANSCRIPTION ENCOUNTER (OUTPATIENT)
Age: 60
End: 2025-06-06

## 2025-06-06 ENCOUNTER — NON-APPOINTMENT (OUTPATIENT)
Age: 60
End: 2025-06-06

## 2025-06-06 VITALS
HEART RATE: 83 BPM | RESPIRATION RATE: 18 BRPM | TEMPERATURE: 98 F | SYSTOLIC BLOOD PRESSURE: 100 MMHG | OXYGEN SATURATION: 93 % | DIASTOLIC BLOOD PRESSURE: 64 MMHG

## 2025-06-06 PROCEDURE — 86900 BLOOD TYPING SEROLOGIC ABO: CPT

## 2025-06-06 PROCEDURE — 85730 THROMBOPLASTIN TIME PARTIAL: CPT

## 2025-06-06 PROCEDURE — 85610 PROTHROMBIN TIME: CPT

## 2025-06-06 PROCEDURE — 84295 ASSAY OF SERUM SODIUM: CPT

## 2025-06-06 PROCEDURE — 99285 EMERGENCY DEPT VISIT HI MDM: CPT | Mod: 25

## 2025-06-06 PROCEDURE — 85014 HEMATOCRIT: CPT

## 2025-06-06 PROCEDURE — 88304 TISSUE EXAM BY PATHOLOGIST: CPT

## 2025-06-06 PROCEDURE — 85027 COMPLETE CBC AUTOMATED: CPT

## 2025-06-06 PROCEDURE — 83690 ASSAY OF LIPASE: CPT

## 2025-06-06 PROCEDURE — 82803 BLOOD GASES ANY COMBINATION: CPT

## 2025-06-06 PROCEDURE — 84484 ASSAY OF TROPONIN QUANT: CPT

## 2025-06-06 PROCEDURE — 85018 HEMOGLOBIN: CPT

## 2025-06-06 PROCEDURE — 86850 RBC ANTIBODY SCREEN: CPT

## 2025-06-06 PROCEDURE — 80053 COMPREHEN METABOLIC PANEL: CPT

## 2025-06-06 PROCEDURE — 84100 ASSAY OF PHOSPHORUS: CPT

## 2025-06-06 PROCEDURE — 74177 CT ABD & PELVIS W/CONTRAST: CPT

## 2025-06-06 PROCEDURE — 82947 ASSAY GLUCOSE BLOOD QUANT: CPT

## 2025-06-06 PROCEDURE — 96374 THER/PROPH/DIAG INJ IV PUSH: CPT

## 2025-06-06 PROCEDURE — 82435 ASSAY OF BLOOD CHLORIDE: CPT

## 2025-06-06 PROCEDURE — 76705 ECHO EXAM OF ABDOMEN: CPT

## 2025-06-06 PROCEDURE — 84132 ASSAY OF SERUM POTASSIUM: CPT

## 2025-06-06 PROCEDURE — 96375 TX/PRO/DX INJ NEW DRUG ADDON: CPT

## 2025-06-06 PROCEDURE — 86901 BLOOD TYPING SEROLOGIC RH(D): CPT

## 2025-06-06 PROCEDURE — 81003 URINALYSIS AUTO W/O SCOPE: CPT

## 2025-06-06 PROCEDURE — C9399: CPT

## 2025-06-06 PROCEDURE — 82330 ASSAY OF CALCIUM: CPT

## 2025-06-06 PROCEDURE — 36415 COLL VENOUS BLD VENIPUNCTURE: CPT

## 2025-06-06 PROCEDURE — 93005 ELECTROCARDIOGRAM TRACING: CPT

## 2025-06-06 PROCEDURE — 83735 ASSAY OF MAGNESIUM: CPT

## 2025-06-06 PROCEDURE — 85025 COMPLETE CBC W/AUTO DIFF WBC: CPT

## 2025-06-06 PROCEDURE — C1889: CPT

## 2025-06-06 PROCEDURE — 84702 CHORIONIC GONADOTROPIN TEST: CPT

## 2025-06-06 PROCEDURE — 83605 ASSAY OF LACTIC ACID: CPT

## 2025-06-06 RX ORDER — ACETAMINOPHEN 500 MG/5ML
2 LIQUID (ML) ORAL
Qty: 0 | Refills: 0 | DISCHARGE
Start: 2025-06-06

## 2025-06-06 RX ORDER — OXYCODONE HYDROCHLORIDE 30 MG/1
1 TABLET ORAL
Qty: 5 | Refills: 0
Start: 2025-06-06

## 2025-06-06 RX ORDER — IBUPROFEN 200 MG
1 TABLET ORAL
Qty: 0 | Refills: 0 | DISCHARGE
Start: 2025-06-06

## 2025-06-06 RX ADMIN — OXYCODONE HYDROCHLORIDE 5 MILLIGRAM(S): 30 TABLET ORAL at 12:57

## 2025-06-06 RX ADMIN — Medication 1000 MILLIGRAM(S): at 05:13

## 2025-06-06 RX ADMIN — Medication 1000 MILLIGRAM(S): at 00:00

## 2025-06-06 RX ADMIN — Medication 1000 MILLIGRAM(S): at 11:38

## 2025-06-06 RX ADMIN — Medication 400 MILLIGRAM(S): at 09:45

## 2025-06-06 RX ADMIN — OXYCODONE HYDROCHLORIDE 5 MILLIGRAM(S): 30 TABLET ORAL at 13:45

## 2025-06-06 RX ADMIN — Medication 1000 MILLIGRAM(S): at 06:00

## 2025-06-06 RX ADMIN — Medication 400 MILLIGRAM(S): at 09:05

## 2025-06-06 RX ADMIN — Medication 1000 MILLIGRAM(S): at 10:48

## 2025-06-06 NOTE — PROGRESS NOTE ADULT - ASSESSMENT
59 year old woman, PMH of gastric ulcers and gallstones, P[facility] of [facility], BSO in 2016 Pfannenstiel incision) thyroidectomy in 2016, presents with abdominal pain c/f cholecystitis. POD#1 lap josé miguel.     Plan  - reg diet  - pain control PRN  - dvt ppx  - home today     Trauma/ACS  [phone] 
59 year old woman, PMH of gastric ulcers and gallstones, [facility] of [facility], BSO in 2016 Pfannenstiel incision) thyroidectomy in 2016, presents with abdominal pain c/f cholecystitis.     Plan  - NPO/IVF  - lap josé miguel today   - med rec incomplete. Pt states she takes one medication for acid reflux, which she could not remember  - pain control PRN  - dvt ppx    Trauma/ACS  [phone]

## 2025-06-06 NOTE — PROGRESS NOTE ADULT - SUBJECTIVE AND OBJECTIVE BOX
Surgery Progress Note:    OVERNIGHT EVENTS: NAEO    SUBJECTIVE: Pt seen and examined at bedside. Patient comfortable and in no-apparent distress. Pain is controlled.     MEDICATIONS  (STANDING):  acetaminophen     Tablet .. 1000 milliGRAM(s) Oral every 6 hours  enoxaparin Injectable 40 milliGRAM(s) SubCutaneous every 24 hours  ibuprofen  Tablet. 400 milliGRAM(s) Oral every 6 hours  lactated ringers. 1000 milliLiter(s) (100 mL/Hr) IV Continuous <Continuous>    MEDICATIONS  (PRN):  HYDROmorphone  Injectable 0.5 milliGRAM(s) IV Push every 10 minutes PRN Severe Pain (7 - 10)  oxyCODONE    IR 2.5 milliGRAM(s) Oral every 4 hours PRN Moderate Pain (4 - 6)  oxyCODONE    IR 5 milliGRAM(s) Oral every 4 hours PRN Severe Pain (7 - 10)    T(C): 36.4 (06-05-25 @ 11:11), Max: 37.1 (06-04-25 @ 13:01)  HR: 85 (06-05-25 @ 11:21) (61 - 99)  BP: 101/55 (06-05-25 @ 11:21) (101/55 - 162/92)  RR: 16 (06-05-25 @ 11:21) (15 - 20)  SpO2: 95% (06-05-25 @ 11:21) (93% - 100%)    06-04-25 @ 07:01  -  06-05-25 @ 07:00  --------------------------------------------------------  IN: 2300 mL / OUT: 0 mL / NET: 2300 mL      LABS:                        13.7   4.11  )-----------( 197      ( 05 Jun 2025 04:20 )             41.4     06-05    142  |  106  |  9   ----------------------------<  91  3.3[L]   |  25  |  0.56    Ca    9.3      05 Jun 2025 04:20  Phos  3.6     06-05  Mg     2.2     06-05    TPro  7.4  /  Alb  4.2  /  TBili  0.4  /  DBili  x   /  AST  24  /  ALT  27  /  AlkPhos  96  06-05    PT/INR - ( 05 Jun 2025 04:20 )   PT: 11.3 sec;   INR: 0.99 ratio         PTT - ( 05 Jun 2025 04:20 )  PTT:28.4 sec  Urinalysis Basic - ( 05 Jun 2025 04:20 )    Color: x / Appearance: x / SG: x / pH: x  Gluc: 91 mg/dL / Ketone: x  / Bili: x / Urobili: x   Blood: x / Protein: x / Nitrite: x   Leuk Esterase: x / RBC: x / WBC x   Sq Epi: x / Non Sq Epi: x / Bacteria: x      PE:  Gen: NAD  CV: Pulse regular present  Resp: Nonlabored  Abdomen: Soft, nontender, some RUQ tenderness to palp
Surgery Progress Note:    OVERNIGHT EVENTS: NAEO    SUBJECTIVE: Pt seen and examined at bedside. Patient comfortable and in no-apparent distress. Pain is controlled.     MEDICATIONS  (STANDING):  acetaminophen     Tablet .. 1000 milliGRAM(s) Oral every 6 hours  enoxaparin Injectable 40 milliGRAM(s) SubCutaneous every 24 hours  famotidine    Tablet 20 milliGRAM(s) Oral two times a day  ibuprofen  Tablet. 400 milliGRAM(s) Oral every 6 hours    MEDICATIONS  (PRN):  oxyCODONE    IR 2.5 milliGRAM(s) Oral every 4 hours PRN Moderate Pain (4 - 6)  oxyCODONE    IR 5 milliGRAM(s) Oral every 4 hours PRN Severe Pain (7 - 10)    T(C): 37 (06-06-25 @ 08:59), Max: 37.3 (06-05-25 @ 16:45)  HR: 78 (06-06-25 @ 08:59) (77 - 97)  BP: 109/72 (06-06-25 @ 08:59) (100/70 - 131/77)  RR: 18 (06-06-25 @ 08:59) (14 - 18)  SpO2: 96% (06-06-25 @ 08:59) (93% - 98%)    06-05-25 @ 07:01  -  06-06-25 @ 07:00  --------------------------------------------------------  IN: 440 mL / OUT: 1450 mL / NET: -1010 mL      LABS:                        13.7   4.11  )-----------( 197      ( 05 Jun 2025 04:20 )             41.4     06-05    142  |  106  |  9   ----------------------------<  91  3.3[L]   |  25  |  0.56    Ca    9.3      05 Jun 2025 04:20  Phos  3.6     06-05  Mg     2.2     06-05    TPro  7.4  /  Alb  4.2  /  TBili  0.4  /  DBili  x   /  AST  24  /  ALT  27  /  AlkPhos  96  06-05    PT/INR - ( 05 Jun 2025 04:20 )   PT: 11.3 sec;   INR: 0.99 ratio         PTT - ( 05 Jun 2025 04:20 )  PTT:28.4 sec  Urinalysis Basic - ( 05 Jun 2025 04:20 )    Color: x / Appearance: x / SG: x / pH: x  Gluc: 91 mg/dL / Ketone: x  / Bili: x / Urobili: x   Blood: x / Protein: x / Nitrite: x   Leuk Esterase: x / RBC: x / WBC x   Sq Epi: x / Non Sq Epi: x / Bacteria: x      PE:  Gen: NAD  CV: Pulse regular present  Resp: Nonlabored  Abdomen: Soft, nontender, incisions cdi

## 2025-06-06 NOTE — DISCHARGE NOTE NURSING/CASE MANAGEMENT/SOCIAL WORK - FINANCIAL ASSISTANCE
[facility] provides services at a reduced cost to those who are determined to be eligible through [facility]’s financial assistance program. Information regarding [facility]’s financial assistance program can be found by going to [email or web address] or by calling [phone].

## 2025-06-06 NOTE — DISCHARGE NOTE NURSING/CASE MANAGEMENT/SOCIAL WORK - NSDCPEFALRISK_GEN_ALL_CORE
For information on Fall & Injury Prevention, visit: [email or web address] OR  [email or web address] OR  https://www.cdc.gov/steadi/patient.html

## 2025-06-06 NOTE — DISCHARGE NOTE NURSING/CASE MANAGEMENT/SOCIAL WORK - PATIENT PORTAL LINK FT
You can access the FollowMyHealth Patient Portal offered by [facility] by registering at the following website: [email or web address]. By joining [employer]’s FollowMyHealth portal, you will also be able to view your health information using other applications (apps) compatible with our system.

## 2025-06-10 LAB — SURGICAL PATHOLOGY STUDY: SIGNIFICANT CHANGE UP

## 2025-06-11 ENCOUNTER — NON-APPOINTMENT (OUTPATIENT)
Age: 60
End: 2025-06-11

## 2025-07-15 PROCEDURE — G0463: CPT

## 2025-07-15 PROCEDURE — T1013: CPT

## (undated) DEVICE — POSITIONER FOAM EGG CRATE ULNAR 2PCS (PINK)

## (undated) DEVICE — SENSOR O2 FINGER ADULT

## (undated) DEVICE — ENDOCUFF VISION SZ 3 SM PRPL

## (undated) DEVICE — PACK IV START WITH CHG

## (undated) DEVICE — POLY TRAP ETRAP

## (undated) DEVICE — TUBING SUCTION 20FT

## (undated) DEVICE — VENODYNE/SCD SLEEVE CALF MEDIUM

## (undated) DEVICE — SUCTION YANKAUER NO CONTROL VENT

## (undated) DEVICE — SYR ALLIANCE II INFLATION 60ML

## (undated) DEVICE — GLV 8.5 PROTEXIS (WHITE)

## (undated) DEVICE — BIOPSY FORCEP RADIAL JAW 4 STANDARD WITH NEEDLE

## (undated) DEVICE — TUBING TRUWAVE PRESSURE MALE/FEMALE 72"

## (undated) DEVICE — BITE BLOCK ADULT 20 X 27MM (GREEN)

## (undated) DEVICE — ENDO DISSECT INST 5MM

## (undated) DEVICE — GLV 6.5 PROTEXIS (WHITE)

## (undated) DEVICE — SPECIMEN CONTAINER 100ML

## (undated) DEVICE — DRAPE C ARM UNIVERSAL

## (undated) DEVICE — DRSG STERISTRIPS 0.5 X 4"

## (undated) DEVICE — SYR LUER LOK 50CC

## (undated) DEVICE — ENDOCATCH 10MM

## (undated) DEVICE — ELCTR LAPAROSCOPIC MONOPOLAR CORD

## (undated) DEVICE — SOL IRR POUR NS 0.9% 500ML

## (undated) DEVICE — FOLEY HOLDER STATLOCK 2 WAY ADULT

## (undated) DEVICE — BALLOON US ENDO

## (undated) DEVICE — CATH IV SAFE BC 20G X 1.16" (PINK)

## (undated) DEVICE — CATH IV SAFE BC 22G X 1" (BLUE)

## (undated) DEVICE — STOPCOCK 3 WAY W TUBE 35"

## (undated) DEVICE — FORCEP RADIAL JAW 4 JUMBO 2.8MM 3.2MM 240CM ORANGE DISP

## (undated) DEVICE — DRAPE INSTRUMENT POUCH 6.75" X 11"

## (undated) DEVICE — GLV 8 PROTEXIS (WHITE)

## (undated) DEVICE — TUBING SUCTION CONN 6FT STERILE

## (undated) DEVICE — SUT POLYSORB 0 36" GU-46

## (undated) DEVICE — ELCTR BOVIE PENCIL SMOKE EVACUATION

## (undated) DEVICE — GLV 7 PROTEXIS (WHITE)

## (undated) DEVICE — SOL INJ NS 0.9% 500ML 2 PORT

## (undated) DEVICE — BRUSH COLONOSCOPY CYTOLOGY

## (undated) DEVICE — TROCAR COVIDIEN VERSAPORT BLADELESS OPTICAL 5MM STANDARD

## (undated) DEVICE — DISSECTOR ENDO PEANUT 5MM

## (undated) DEVICE — TUBING IRRIGATION DAVOL SYSTEM X STREAM

## (undated) DEVICE — IRRIGATOR BIO SHIELD

## (undated) DEVICE — TUBING TUR 2 PRONG

## (undated) DEVICE — D HELP - CLEARVIEW CLEARIFY SYSTEM

## (undated) DEVICE — TUBING INSUFFLATION LAP FILTER 10FT

## (undated) DEVICE — SYR LUER LOK 20CC

## (undated) DEVICE — CATH IV SAFE INSYTE 14G X 1.75" (ORANGE)

## (undated) DEVICE — CLAMP BX HOT RAD JAW 3

## (undated) DEVICE — DRSG TELFA 3 X 8

## (undated) DEVICE — DRSG OPSITE 2.5 X 2"

## (undated) DEVICE — ELCTR GROUNDING PAD ADULT COVIDIEN

## (undated) DEVICE — PREP CHLORAPREP HI-LITE ORANGE 26ML

## (undated) DEVICE — SUT PDS II 0 18" ENDOLOOP LIGATURE

## (undated) DEVICE — TUBING IV SET GRAVITY 3Y 100" MACRO

## (undated) DEVICE — GLV 7.5 PROTEXIS (WHITE)

## (undated) DEVICE — GOWN TRIMAX LG

## (undated) DEVICE — SYR LUER LOK 30CC

## (undated) DEVICE — WARMING BLANKET UPPER ADULT

## (undated) DEVICE — DRAPE MAYO STAND 30"

## (undated) DEVICE — PACK ADVANCED LAPAROSCOPIC NS

## (undated) DEVICE — SUT BIOSYN 4-0 18" P-12

## (undated) DEVICE — TROCAR COVIDIEN BLUNT TIP HASSAN 10MM

## (undated) DEVICE — SOL IRR POUR H2O 250ML

## (undated) DEVICE — DRAPE TOWEL BLUE 17" X 24"

## (undated) DEVICE — TUBING STRYKEFLOW II SUCTION / IRRIGATOR

## (undated) DEVICE — MEDICATION LABELS W MARKER